# Patient Record
Sex: MALE | Race: WHITE | NOT HISPANIC OR LATINO | Employment: UNEMPLOYED | URBAN - NONMETROPOLITAN AREA
[De-identification: names, ages, dates, MRNs, and addresses within clinical notes are randomized per-mention and may not be internally consistent; named-entity substitution may affect disease eponyms.]

---

## 2017-07-20 ENCOUNTER — APPOINTMENT (OUTPATIENT)
Dept: GENERAL RADIOLOGY | Facility: HOSPITAL | Age: 52
End: 2017-07-20

## 2017-07-20 ENCOUNTER — HOSPITAL ENCOUNTER (EMERGENCY)
Facility: HOSPITAL | Age: 52
Discharge: HOME | End: 2017-07-21
Attending: EMERGENCY MEDICINE | Admitting: EMERGENCY MEDICINE

## 2017-07-20 DIAGNOSIS — R42 DIZZINESS: Primary | ICD-10-CM

## 2017-07-20 LAB
ALBUMIN SERPL-MCNC: 4.2 G/DL (ref 3.5–5)
ALBUMIN/GLOB SERPL: 1.4 G/DL (ref 1.1–2.5)
ALP SERPL-CCNC: 76 U/L (ref 24–120)
ALT SERPL W P-5'-P-CCNC: 44 U/L (ref 0–54)
ANION GAP SERPL CALCULATED.3IONS-SCNC: 9 MMOL/L (ref 4–13)
AST SERPL-CCNC: 25 U/L (ref 7–45)
BASOPHILS # BLD AUTO: 0.05 10*3/MM3 (ref 0–0.2)
BASOPHILS NFR BLD AUTO: 0.9 % (ref 0–2)
BILIRUB SERPL-MCNC: 0.6 MG/DL (ref 0.1–1)
BUN BLD-MCNC: 21 MG/DL (ref 5–21)
BUN/CREAT SERPL: 32.3 (ref 7–25)
CALCIUM SPEC-SCNC: 9.2 MG/DL (ref 8.4–10.4)
CHLORIDE SERPL-SCNC: 105 MMOL/L (ref 98–110)
CO2 SERPL-SCNC: 28 MMOL/L (ref 24–31)
CREAT BLD-MCNC: 0.65 MG/DL (ref 0.5–1.4)
DEPRECATED RDW RBC AUTO: 41 FL (ref 40–54)
EOSINOPHIL # BLD AUTO: 0.18 10*3/MM3 (ref 0–0.7)
EOSINOPHIL NFR BLD AUTO: 3.2 % (ref 0–4)
ERYTHROCYTE [DISTWIDTH] IN BLOOD BY AUTOMATED COUNT: 12.8 % (ref 12–15)
GFR SERPL CREATININE-BSD FRML MDRD: 129 ML/MIN/1.73
GLOBULIN UR ELPH-MCNC: 2.9 GM/DL
GLUCOSE BLD-MCNC: 213 MG/DL (ref 70–100)
HCT VFR BLD AUTO: 36.9 % (ref 40–52)
HGB BLD-MCNC: 12.6 G/DL (ref 14–18)
IMM GRANULOCYTES # BLD: 0.01 10*3/MM3 (ref 0–0.03)
IMM GRANULOCYTES NFR BLD: 0.2 % (ref 0–5)
LYMPHOCYTES # BLD AUTO: 2.42 10*3/MM3 (ref 0.72–4.86)
LYMPHOCYTES NFR BLD AUTO: 43.4 % (ref 15–45)
MCH RBC QN AUTO: 29.7 PG (ref 28–32)
MCHC RBC AUTO-ENTMCNC: 34.1 G/DL (ref 33–36)
MCV RBC AUTO: 87 FL (ref 82–95)
MONOCYTES # BLD AUTO: 0.45 10*3/MM3 (ref 0.19–1.3)
MONOCYTES NFR BLD AUTO: 8.1 % (ref 4–12)
NEUTROPHILS # BLD AUTO: 2.47 10*3/MM3 (ref 1.87–8.4)
NEUTROPHILS NFR BLD AUTO: 44.2 % (ref 39–78)
PLATELET # BLD AUTO: 180 10*3/MM3 (ref 130–400)
PMV BLD AUTO: 9.3 FL (ref 6–12)
POTASSIUM BLD-SCNC: 4.2 MMOL/L (ref 3.5–5.3)
PROT SERPL-MCNC: 7.1 G/DL (ref 6.3–8.7)
RBC # BLD AUTO: 4.24 10*6/MM3 (ref 4.8–5.9)
SODIUM BLD-SCNC: 142 MMOL/L (ref 135–145)
WBC NRBC COR # BLD: 5.58 10*3/MM3 (ref 4.8–10.8)

## 2017-07-20 PROCEDURE — 80053 COMPREHEN METABOLIC PANEL: CPT | Performed by: EMERGENCY MEDICINE

## 2017-07-20 PROCEDURE — 96360 HYDRATION IV INFUSION INIT: CPT

## 2017-07-20 PROCEDURE — 93010 ELECTROCARDIOGRAM REPORT: CPT | Performed by: INTERNAL MEDICINE

## 2017-07-20 PROCEDURE — 96361 HYDRATE IV INFUSION ADD-ON: CPT

## 2017-07-20 PROCEDURE — 71020 HC CHEST PA AND LATERAL: CPT

## 2017-07-20 PROCEDURE — 85025 COMPLETE CBC W/AUTO DIFF WBC: CPT | Performed by: EMERGENCY MEDICINE

## 2017-07-20 PROCEDURE — 99284 EMERGENCY DEPT VISIT MOD MDM: CPT

## 2017-07-20 PROCEDURE — 93005 ELECTROCARDIOGRAM TRACING: CPT | Performed by: EMERGENCY MEDICINE

## 2017-07-20 RX ADMIN — SODIUM CHLORIDE 1000 ML: 9 INJECTION, SOLUTION INTRAVENOUS at 21:51

## 2017-07-21 VITALS
DIASTOLIC BLOOD PRESSURE: 82 MMHG | SYSTOLIC BLOOD PRESSURE: 147 MMHG | TEMPERATURE: 98.1 F | OXYGEN SATURATION: 99 % | HEIGHT: 75 IN | RESPIRATION RATE: 18 BRPM | HEART RATE: 64 BPM | BODY MASS INDEX: 20.44 KG/M2 | WEIGHT: 164.38 LBS

## 2017-07-21 NOTE — ED PROVIDER NOTES
Subjective   HPI Comments: P{t c/o dizziness for the past 6 months.  Was told by his PCP to come to the ED for a stress test.  He has had no CP or SOB.  Pt reports that he has had multiple test performed by his PCP with no answer as to why he will become dizzy when he stands.        History provided by:  Patient      Review of Systems   Constitutional: Negative for activity change, appetite change, chills, diaphoresis, fatigue and fever.   HENT: Negative for congestion, ear pain, nosebleeds, postnasal drip and sinus pressure.    Eyes: Negative for photophobia and pain.   Respiratory: Negative for cough, chest tightness, shortness of breath and wheezing.    Cardiovascular: Negative for chest pain.   Gastrointestinal: Negative for abdominal pain, blood in stool, diarrhea, nausea and vomiting.   Endocrine: Negative for cold intolerance and heat intolerance.   Genitourinary: Negative for difficulty urinating, dysuria and flank pain.   Musculoskeletal: Negative for arthralgias, back pain, neck pain and neck stiffness.   Skin: Negative for color change and rash.   Neurological: Positive for dizziness. Negative for weakness and headaches.   Hematological: Negative for adenopathy. Does not bruise/bleed easily.   Psychiatric/Behavioral: Negative for confusion and sleep disturbance. The patient is not nervous/anxious.        Past Medical History:   Diagnosis Date   • Diabetes mellitus        No Known Allergies    Past Surgical History:   Procedure Laterality Date   • NO PAST SURGERIES         History reviewed. No pertinent family history.    Social History     Social History   • Marital status:      Spouse name: N/A   • Number of children: N/A   • Years of education: N/A     Social History Main Topics   • Smoking status: Never Smoker   • Smokeless tobacco: None   • Alcohol use No   • Drug use: No   • Sexual activity: Not Asked     Other Topics Concern   • None     Social History Narrative   • None           Objective    Physical Exam   Constitutional: He is oriented to person, place, and time. He appears well-developed and well-nourished. No distress.   HENT:   Head: Normocephalic and atraumatic.   Mouth/Throat: Oropharynx is clear and moist. No oropharyngeal exudate.   Eyes: Conjunctivae and EOM are normal. Pupils are equal, round, and reactive to light.   Neck: Normal range of motion. Neck supple. No JVD present.   Cardiovascular: Normal rate, regular rhythm and normal heart sounds.  Exam reveals no friction rub.    No murmur heard.  Pulmonary/Chest: Effort normal and breath sounds normal. He has no wheezes. He has no rales.   Abdominal: Soft. Bowel sounds are normal. He exhibits no distension. There is no tenderness. There is no rebound and no guarding.   Musculoskeletal: Normal range of motion. He exhibits no edema or tenderness.   Neurological: He is alert and oriented to person, place, and time. He has normal strength and normal reflexes. No cranial nerve deficit or sensory deficit. He displays a negative Romberg sign. Coordination and gait normal. GCS eye subscore is 4. GCS verbal subscore is 5. GCS motor subscore is 6.   Skin: Skin is warm and dry. No rash noted.   Psychiatric: He has a normal mood and affect. His behavior is normal. Judgment and thought content normal.   Nursing note and vitals reviewed.      Procedures         ED Course  ED Course   Value Comment By Time   ECG 12 Lead Normal sinus rhythm with a rate of 76, normal axis, no acute ST elevations or depressions. Don Hemphill MD 07/20 2113   ECG 12 Lead Normal sinus rhythm with a rate of 69, normal axis, no acute ST elevations or depressions. Don Hemphill MD 07/21 0023                  [facility]  Number of Diagnoses or Management Options  Dizziness: new and requires workup  Diagnosis management comments: Discussed with patient about his lab work, EKG and imaging.  His dizziness has been an ongoing issue for 6 months.  I explained to him that he was not  going to get a stress test to the ER.  There has been no change in his EKG he has not complained to me of any chest pain.  He has not been dizzy while here in the ED.  We will have him follow up with her primary care was told to return to ED if any further issues or new complaints.       Amount and/or Complexity of Data Reviewed  Clinical lab tests: ordered and reviewed  Tests in the radiology section of CPT®: ordered and reviewed  Tests in the medicine section of CPT®: ordered and reviewed  Independent visualization of images, tracings, or specimens: yes    Risk of Complications, Morbidity, and/or Mortality  Presenting problems: moderate  Diagnostic procedures: moderate  Management options: moderate    Patient Progress  Patient progress: stable      Final diagnoses:   Dizziness            Don Hemphill MD  07/21/17 0025       Don Hemphill MD  08/03/17 1936

## 2017-10-02 ENCOUNTER — OFFICE VISIT (OUTPATIENT)
Dept: NEUROLOGY | Facility: CLINIC | Age: 52
End: 2017-10-02

## 2017-10-02 VITALS
HEART RATE: 74 BPM | SYSTOLIC BLOOD PRESSURE: 72 MMHG | HEIGHT: 75 IN | WEIGHT: 159 LBS | BODY MASS INDEX: 19.77 KG/M2 | DIASTOLIC BLOOD PRESSURE: 42 MMHG | RESPIRATION RATE: 18 BRPM

## 2017-10-02 DIAGNOSIS — R26.89 IMBALANCE: ICD-10-CM

## 2017-10-02 DIAGNOSIS — R51.9 CHRONIC DAILY HEADACHE: Primary | ICD-10-CM

## 2017-10-02 DIAGNOSIS — I95.1 ORTHOSTASIS: ICD-10-CM

## 2017-10-02 PROCEDURE — 99204 OFFICE O/P NEW MOD 45 MIN: CPT | Performed by: PSYCHIATRY & NEUROLOGY

## 2017-10-02 RX ORDER — AMPICILLIN 500 MG/1
500 CAPSULE ORAL 3 TIMES DAILY
COMMUNITY
End: 2019-03-27 | Stop reason: HOSPADM

## 2017-10-02 NOTE — PATIENT INSTRUCTIONS
Fall precautions and safety precautions and driving precautions.  Patient not to be climbing or using sharp cutting tools and is not working over hot fires or water.  Patient to get with PCP immediately about elevated blood sugars and orthostasis as well as abrasions over her scalp area

## 2017-10-02 NOTE — PROGRESS NOTES
Subjective   Ford High, 1965, is a male who is being seen today for   Chief Complaint   Patient presents with   • Headache   • Dizziness       HISTORY OF PRESENT ILLNESS: Patient presents with a history of headaches and dizziness.  Patient complains of chronic daily headache about significant nausea vomiting or change in vision.  This is usually over the vertex area.  Patient has constant/lightheadedness.  Patient according to the notes is poorly controlled diabetic.  Last blood work he had in August showed a glucose of 446 and BUN of 28.  CBC showed no major abnormalities.  Patient says he bumps his had frequently and has abrasions over the scalp area.  Patient denies any history of seizures or loss of consciousness or significant head trauma.  Patient denies any family history of migraine or aneurysms.    REVIEW OF SYSTEMS:   GENERAL: Denies any fever chills  PULMONARY: Denies any shortness of breath  CVS:  Denies any chest pain  GASTROINTESTINAL: No acute GI distress  GENITOURINARY: No acute  distress  GYN: Not applicable  MUSCULOSKELETAL: No acute musculoskeletal symptoms  HEENT:  As above  ENDOCRINE:  No acute endocrine symptoms other than as above  PSYCHIATRIC: No acute psychiatric symptoms  HEMATOLOGY: As above  SKIN: As above  Family history reviewed and otherwise noncontributory  Social history: Patient denies smoking or alcohol or drug use.  Patient delivers pizzas    PHYSICAL EXAMINATION:    GENERAL: As above  CRANIUM: Abrasions as noted above  HEENT: No acute fundic abnormalities.  Pupils equal round reactive to light.       EYES: EOMs intact without nystagmus and fields full to confrontation       EARS:  Tympanic membranes normal/ hears tuning fork bilaterally       THROAT: No oropharynx abnormalities       NECK:  No bruits/no lymphadenopathy  CHEST: No acute cardiopulmonary abnormalities by auscultation  ABDOMEN: Nondistended  EXTREMITIES: Pulses symmetrical  NEURO: Patient alert and  follows commands without difficulty  SPEECH:  Normal    CRANIAL NERVES:  Motor sensory about the face normal and symmetric    MOTOR STRENGTH:  Motor strength upper and lower extremities normal  STATION AND GAIT:  Gait is normal/Romberg negative  CEREBELLAR:  Finger-nose and heel shin normal  SENSORY:  Pin and vibration upper and lower extremities normal  REFLEXES:  Reflexes present and symmetric upper and lower extremities without Babinski's      ASSESSMENT AND PLAN:  Patient with history of chronic daily headache and orthostasis and elevated blood sugars as above.  Patient is to get with PCP about blood pressures and get baseline MRI brain (she denies any metal to contraindicate) and EEG/carotid ultrasound and further blood work.  I did discuss at length with him safety precautions and driving precautions.      Ford was seen today for headache and dizziness.    Diagnoses and all orders for this visit:    Chronic daily headache  -     EEG; Future  -     MRI Brain Without Contrast; Future  -     CBC & Differential; Future  -     Comprehensive Metabolic Panel; Future  -     Folate; Future  -     Lipid Panel; Future  -     Magnesium; Future  -     Sedimentation Rate; Future  -     T4, Free; Future  -     Vitamin B12; Future  -     Hemoglobin A1c; Future    Orthostasis    Imbalance  -     US Carotid Bilateral; Future

## 2017-10-16 ENCOUNTER — LAB (OUTPATIENT)
Dept: LAB | Facility: HOSPITAL | Age: 52
End: 2017-10-16
Attending: PSYCHIATRY & NEUROLOGY

## 2017-10-16 DIAGNOSIS — R51.9 CHRONIC DAILY HEADACHE: ICD-10-CM

## 2017-10-16 LAB
ALBUMIN SERPL-MCNC: 4.3 G/DL (ref 3.5–5)
ALBUMIN/GLOB SERPL: 1.5 G/DL (ref 1.1–2.5)
ALP SERPL-CCNC: 110 U/L (ref 24–120)
ALT SERPL W P-5'-P-CCNC: 29 U/L (ref 0–54)
ANION GAP SERPL CALCULATED.3IONS-SCNC: 11 MMOL/L (ref 4–13)
ARTICHOKE IGE QN: 163 MG/DL (ref 0–99)
AST SERPL-CCNC: 21 U/L (ref 7–45)
BASOPHILS # BLD AUTO: 0.03 10*3/MM3 (ref 0–0.2)
BASOPHILS NFR BLD AUTO: 0.4 % (ref 0–2)
BILIRUB SERPL-MCNC: 0.3 MG/DL (ref 0.1–1)
BUN BLD-MCNC: 27 MG/DL (ref 5–21)
BUN/CREAT SERPL: 35.5 (ref 7–25)
CALCIUM SPEC-SCNC: 9.3 MG/DL (ref 8.4–10.4)
CHLORIDE SERPL-SCNC: 100 MMOL/L (ref 98–110)
CHOLEST SERPL-MCNC: 269 MG/DL (ref 130–200)
CO2 SERPL-SCNC: 26 MMOL/L (ref 24–31)
CREAT BLD-MCNC: 0.76 MG/DL (ref 0.5–1.4)
DEPRECATED RDW RBC AUTO: 40.9 FL (ref 40–54)
EOSINOPHIL # BLD AUTO: 0.19 10*3/MM3 (ref 0–0.7)
EOSINOPHIL NFR BLD AUTO: 2.8 % (ref 0–4)
ERYTHROCYTE [DISTWIDTH] IN BLOOD BY AUTOMATED COUNT: 12.8 % (ref 12–15)
ERYTHROCYTE [SEDIMENTATION RATE] IN BLOOD: 7 MM/HR (ref 0–15)
FOLATE SERPL-MCNC: 11 NG/ML
GFR SERPL CREATININE-BSD FRML MDRD: 108 ML/MIN/1.73
GLOBULIN UR ELPH-MCNC: 2.8 GM/DL
GLUCOSE BLD-MCNC: 380 MG/DL (ref 70–100)
HBA1C MFR BLD: 12.1 %
HCT VFR BLD AUTO: 36.6 % (ref 40–52)
HDLC SERPL-MCNC: 36 MG/DL
HGB BLD-MCNC: 12.7 G/DL (ref 14–18)
IMM GRANULOCYTES # BLD: 0.01 10*3/MM3 (ref 0–0.03)
IMM GRANULOCYTES NFR BLD: 0.1 % (ref 0–5)
LDLC/HDLC SERPL: ABNORMAL {RATIO}
LYMPHOCYTES # BLD AUTO: 2.86 10*3/MM3 (ref 0.72–4.86)
LYMPHOCYTES NFR BLD AUTO: 42.1 % (ref 15–45)
MAGNESIUM SERPL-MCNC: 2.1 MG/DL (ref 1.4–2.2)
MCH RBC QN AUTO: 30.4 PG (ref 28–32)
MCHC RBC AUTO-ENTMCNC: 34.7 G/DL (ref 33–36)
MCV RBC AUTO: 87.6 FL (ref 82–95)
MONOCYTES # BLD AUTO: 0.51 10*3/MM3 (ref 0.19–1.3)
MONOCYTES NFR BLD AUTO: 7.5 % (ref 4–12)
NEUTROPHILS # BLD AUTO: 3.19 10*3/MM3 (ref 1.87–8.4)
NEUTROPHILS NFR BLD AUTO: 47.1 % (ref 39–78)
PLATELET # BLD AUTO: 277 10*3/MM3 (ref 130–400)
PMV BLD AUTO: 10 FL (ref 6–12)
POTASSIUM BLD-SCNC: 4.7 MMOL/L (ref 3.5–5.3)
PROT SERPL-MCNC: 7.1 G/DL (ref 6.3–8.7)
RBC # BLD AUTO: 4.18 10*6/MM3 (ref 4.8–5.9)
SODIUM BLD-SCNC: 137 MMOL/L (ref 135–145)
T4 FREE SERPL-MCNC: 0.92 NG/DL (ref 0.78–2.19)
TRIGL SERPL-MCNC: 412 MG/DL (ref 0–149)
VIT B12 BLD-MCNC: 664 PG/ML (ref 239–931)
WBC NRBC COR # BLD: 6.79 10*3/MM3 (ref 4.8–10.8)

## 2017-10-16 PROCEDURE — 84439 ASSAY OF FREE THYROXINE: CPT | Performed by: PSYCHIATRY & NEUROLOGY

## 2017-10-16 PROCEDURE — 82746 ASSAY OF FOLIC ACID SERUM: CPT | Performed by: PSYCHIATRY & NEUROLOGY

## 2017-10-16 PROCEDURE — 80053 COMPREHEN METABOLIC PANEL: CPT | Performed by: PSYCHIATRY & NEUROLOGY

## 2017-10-16 PROCEDURE — 83036 HEMOGLOBIN GLYCOSYLATED A1C: CPT | Performed by: PSYCHIATRY & NEUROLOGY

## 2017-10-16 PROCEDURE — 85025 COMPLETE CBC W/AUTO DIFF WBC: CPT | Performed by: PSYCHIATRY & NEUROLOGY

## 2017-10-16 PROCEDURE — 82607 VITAMIN B-12: CPT | Performed by: PSYCHIATRY & NEUROLOGY

## 2017-10-16 PROCEDURE — 83735 ASSAY OF MAGNESIUM: CPT | Performed by: PSYCHIATRY & NEUROLOGY

## 2017-10-16 PROCEDURE — 85651 RBC SED RATE NONAUTOMATED: CPT | Performed by: PSYCHIATRY & NEUROLOGY

## 2017-10-16 PROCEDURE — 80061 LIPID PANEL: CPT | Performed by: PSYCHIATRY & NEUROLOGY

## 2017-10-19 ENCOUNTER — HOSPITAL ENCOUNTER (OUTPATIENT)
Dept: ULTRASOUND IMAGING | Facility: HOSPITAL | Age: 52
Discharge: HOME | End: 2017-10-19
Attending: PSYCHIATRY & NEUROLOGY

## 2017-10-19 ENCOUNTER — HOSPITAL ENCOUNTER (OUTPATIENT)
Dept: NEUROLOGY | Facility: HOSPITAL | Age: 52
Discharge: HOME | End: 2017-10-19
Attending: PSYCHIATRY & NEUROLOGY | Admitting: PSYCHIATRY & NEUROLOGY

## 2017-10-19 ENCOUNTER — HOSPITAL ENCOUNTER (OUTPATIENT)
Dept: MRI IMAGING | Facility: HOSPITAL | Age: 52
Discharge: HOME | End: 2017-10-19
Attending: PSYCHIATRY & NEUROLOGY

## 2017-10-19 DIAGNOSIS — R26.89 IMBALANCE: ICD-10-CM

## 2017-10-19 DIAGNOSIS — R51.9 CHRONIC DAILY HEADACHE: ICD-10-CM

## 2017-10-19 PROCEDURE — 95816 EEG AWAKE AND DROWSY: CPT | Performed by: PSYCHIATRY & NEUROLOGY

## 2017-10-19 PROCEDURE — 70551 MRI BRAIN STEM W/O DYE: CPT

## 2017-10-19 PROCEDURE — 93880 EXTRACRANIAL BILAT STUDY: CPT

## 2017-10-19 PROCEDURE — 95816 EEG AWAKE AND DROWSY: CPT

## 2017-10-19 PROCEDURE — 93880 EXTRACRANIAL BILAT STUDY: CPT | Performed by: SURGERY

## 2017-10-20 ENCOUNTER — TELEPHONE (OUTPATIENT)
Dept: NEUROLOGY | Facility: CLINIC | Age: 52
End: 2017-10-20

## 2017-10-20 NOTE — TELEPHONE ENCOUNTER
----- Message from Jasbir Costello MD sent at 10/19/2017  1:10 PM CDT -----  Contact patient that MRI brain showed no acute abnormalities.  There is general atrophy slightly greater than expected for age.

## 2017-10-26 ENCOUNTER — APPOINTMENT (OUTPATIENT)
Dept: CT IMAGING | Facility: HOSPITAL | Age: 52
End: 2017-10-26

## 2017-10-26 ENCOUNTER — HOSPITAL ENCOUNTER (EMERGENCY)
Facility: HOSPITAL | Age: 52
Discharge: HOME | End: 2017-10-26
Attending: EMERGENCY MEDICINE | Admitting: EMERGENCY MEDICINE

## 2017-10-26 VITALS
SYSTOLIC BLOOD PRESSURE: 144 MMHG | RESPIRATION RATE: 16 BRPM | BODY MASS INDEX: 19.89 KG/M2 | DIASTOLIC BLOOD PRESSURE: 88 MMHG | HEIGHT: 75 IN | TEMPERATURE: 97.8 F | HEART RATE: 81 BPM | OXYGEN SATURATION: 99 % | WEIGHT: 160 LBS
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
## 2017-10-26 DIAGNOSIS — S01.01XA LACERATION OF SCALP, INITIAL ENCOUNTER: ICD-10-CM

## 2017-10-26 PROCEDURE — 70450 CT HEAD/BRAIN W/O DYE: CPT

## 2017-10-26 PROCEDURE — 25010000002 TDAP 5-2.5-18.5 LF-MCG/0.5 SUSPENSION: Performed by: EMERGENCY MEDICINE

## 2017-10-26 PROCEDURE — 90715 TDAP VACCINE 7 YRS/> IM: CPT | Performed by: EMERGENCY MEDICINE

## 2017-10-26 PROCEDURE — 99284 EMERGENCY DEPT VISIT MOD MDM: CPT

## 2017-10-26 PROCEDURE — 90471 IMMUNIZATION ADMIN: CPT | Performed by: EMERGENCY MEDICINE

## 2017-10-26 RX ORDER — LIDOCAINE HYDROCHLORIDE AND EPINEPHRINE 10; 10 MG/ML; UG/ML
10 INJECTION, SOLUTION INFILTRATION; PERINEURAL ONCE
Status: COMPLETED | OUTPATIENT
Start: 2017-10-26 | End: 2017-10-26

## 2017-10-26 RX ORDER — HYDROCODONE BITARTRATE AND ACETAMINOPHEN 7.5; 325 MG/1; MG/1
1 TABLET ORAL EVERY 4 HOURS PRN
Qty: 15 TABLET | Refills: 0 | Status: ON HOLD | OUTPATIENT
Start: 2017-10-26 | End: 2020-02-06

## 2017-10-26 RX ADMIN — TETANUS TOXOID, REDUCED DIPHTHERIA TOXOID AND ACELLULAR PERTUSSIS VACCINE, ADSORBED 0.5 ML: 5; 2.5; 8; 8; 2.5 SUSPENSION INTRAMUSCULAR at 19:11

## 2017-10-26 RX ADMIN — LIDOCAINE HYDROCHLORIDE AND EPINEPHRINE 10 ML: 10; 10 INJECTION, SOLUTION INFILTRATION; PERINEURAL at 18:45

## 2017-10-26 RX ADMIN — LIDOCAINE HYDROCHLORIDE 5 ML: 20 JELLY TOPICAL at 19:25
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
## 2019-02-20 ENCOUNTER — HOSPITAL ENCOUNTER (EMERGENCY)
Facility: HOSPITAL | Age: 54
Discharge: HOME | End: 2019-02-20
Attending: EMERGENCY MEDICINE | Admitting: EMERGENCY MEDICINE

## 2019-02-20 VITALS
SYSTOLIC BLOOD PRESSURE: 114 MMHG | RESPIRATION RATE: 18 BRPM | TEMPERATURE: 97.8 F | WEIGHT: 156.4 LBS | OXYGEN SATURATION: 100 % | HEART RATE: 64 BPM | HEIGHT: 75 IN | BODY MASS INDEX: 19.45 KG/M2 | DIASTOLIC BLOOD PRESSURE: 70 MMHG

## 2019-02-20 DIAGNOSIS — I95.1 POSTURAL HYPOTENSION: Primary | ICD-10-CM

## 2019-02-20 LAB
ALBUMIN SERPL-MCNC: 4.5 G/DL (ref 3.5–5)
ALBUMIN/GLOB SERPL: 1.6 G/DL (ref 1.1–2.5)
ALP SERPL-CCNC: 93 U/L (ref 24–120)
ALT SERPL W P-5'-P-CCNC: 17 U/L (ref 0–54)
ANION GAP SERPL CALCULATED.3IONS-SCNC: 7 MMOL/L (ref 4–13)
ARTERIAL PATENCY WRIST A: POSITIVE
AST SERPL-CCNC: 23 U/L (ref 7–45)
ATMOSPHERIC PRESS: 746 MMHG
BASE EXCESS BLDA CALC-SCNC: 5.6 MMOL/L (ref 0–2)
BASOPHILS # BLD AUTO: 0.05 10*3/MM3 (ref 0–0.2)
BASOPHILS NFR BLD AUTO: 0.8 % (ref 0–2)
BDY SITE: ABNORMAL
BILIRUB SERPL-MCNC: 0.6 MG/DL (ref 0.1–1)
BODY TEMPERATURE: 37 C
BUN BLD-MCNC: 19 MG/DL (ref 5–21)
BUN/CREAT SERPL: 22.9 (ref 7–25)
CALCIUM SPEC-SCNC: 9.4 MG/DL (ref 8.4–10.4)
CHLORIDE SERPL-SCNC: 98 MMOL/L (ref 98–110)
CO2 SERPL-SCNC: 33 MMOL/L (ref 24–31)
CREAT BLD-MCNC: 0.83 MG/DL (ref 0.5–1.4)
DEPRECATED RDW RBC AUTO: 36.7 FL (ref 40–54)
EOSINOPHIL # BLD AUTO: 0.11 10*3/MM3 (ref 0–0.7)
EOSINOPHIL NFR BLD AUTO: 1.7 % (ref 0–4)
ERYTHROCYTE [DISTWIDTH] IN BLOOD BY AUTOMATED COUNT: 11.9 % (ref 12–15)
GFR SERPL CREATININE-BSD FRML MDRD: 97 ML/MIN/1.73
GLOBULIN UR ELPH-MCNC: 2.9 GM/DL
GLUCOSE BLD-MCNC: 148 MG/DL (ref 70–100)
HCO3 BLDA-SCNC: 30.5 MMOL/L (ref 20–26)
HCT VFR BLD AUTO: 37.4 % (ref 40–52)
HGB BLD-MCNC: 13 G/DL (ref 14–18)
IMM GRANULOCYTES # BLD AUTO: 0.03 10*3/MM3 (ref 0–0.05)
IMM GRANULOCYTES NFR BLD AUTO: 0.5 % (ref 0–5)
LYMPHOCYTES # BLD AUTO: 2.77 10*3/MM3 (ref 0.72–4.86)
LYMPHOCYTES NFR BLD AUTO: 42 % (ref 15–45)
Lab: ABNORMAL
MCH RBC QN AUTO: 29.8 PG (ref 28–32)
MCHC RBC AUTO-ENTMCNC: 34.8 G/DL (ref 33–36)
MCV RBC AUTO: 85.8 FL (ref 82–95)
MODALITY: ABNORMAL
MONOCYTES # BLD AUTO: 0.5 10*3/MM3 (ref 0.19–1.3)
MONOCYTES NFR BLD AUTO: 7.6 % (ref 4–12)
NEUTROPHILS # BLD AUTO: 3.14 10*3/MM3 (ref 1.87–8.4)
NEUTROPHILS NFR BLD AUTO: 47.4 % (ref 39–78)
NRBC BLD AUTO-RTO: 0 /100 WBC (ref 0–0)
PCO2 BLDA: 44.5 MM HG (ref 35–45)
PH BLDA: 7.44 PH UNITS (ref 7.35–7.45)
PLATELET # BLD AUTO: 266 10*3/MM3 (ref 130–400)
PMV BLD AUTO: 9.4 FL (ref 6–12)
PO2 BLDA: 94.6 MM HG (ref 83–108)
POTASSIUM BLD-SCNC: 3.3 MMOL/L (ref 3.5–5.3)
PROT SERPL-MCNC: 7.4 G/DL (ref 6.3–8.7)
RBC # BLD AUTO: 4.36 10*6/MM3 (ref 4.8–5.9)
SAO2 % BLDCOA: 98.1 % (ref 94–99)
SODIUM BLD-SCNC: 138 MMOL/L (ref 135–145)
TROPONIN I SERPL-MCNC: <0.012 NG/ML (ref 0–0.03)
VENTILATOR MODE: ABNORMAL
WBC NRBC COR # BLD: 6.6 10*3/MM3 (ref 4.8–10.8)

## 2019-02-20 PROCEDURE — 84484 ASSAY OF TROPONIN QUANT: CPT | Performed by: EMERGENCY MEDICINE

## 2019-02-20 PROCEDURE — 93010 ELECTROCARDIOGRAM REPORT: CPT | Performed by: INTERNAL MEDICINE

## 2019-02-20 PROCEDURE — 99283 EMERGENCY DEPT VISIT LOW MDM: CPT

## 2019-02-20 PROCEDURE — 93005 ELECTROCARDIOGRAM TRACING: CPT | Performed by: EMERGENCY MEDICINE

## 2019-02-20 PROCEDURE — 85025 COMPLETE CBC W/AUTO DIFF WBC: CPT | Performed by: EMERGENCY MEDICINE

## 2019-02-20 PROCEDURE — 93005 ELECTROCARDIOGRAM TRACING: CPT

## 2019-02-20 PROCEDURE — 80053 COMPREHEN METABOLIC PANEL: CPT | Performed by: EMERGENCY MEDICINE

## 2019-02-20 PROCEDURE — 36600 WITHDRAWAL OF ARTERIAL BLOOD: CPT

## 2019-02-20 PROCEDURE — 82803 BLOOD GASES ANY COMBINATION: CPT

## 2019-02-20 NOTE — DISCHARGE INSTRUCTIONS
Near-Syncope  Near-syncope is when you suddenly get weak or dizzy, or you feel like you might pass out (faint). During an episode of near-syncope, you may:  · Feel dizzy or light-headed.  · Feel sick to your stomach (nauseous).  · See all white or all black.  · Have cold, clammy skin.    If you passed out, get help right away.Call your local emergency services (021 in the [place] Do not drive yourself to the hospital.  Follow these instructions at home:  Pay attention to any changes in your symptoms. Take these actions to help with your condition:  · Have someone stay with you until you feel stable.  · Do not drive, use machinery, or play sports until your doctor says it is okay.  · Keep all follow-up visits as told by your doctor. This is important.  · If you start to feel like you might pass out, lie down right away and raise (elevate) your feet above the level of your heart. Breathe deeply and steadily. Wait until all of the symptoms are gone.  · Drink enough fluid to keep your pee (urine) clear or pale yellow.  · If you are taking blood pressure or heart medicine, get up slowly and spend many minutes getting ready to sit and then stand. This can help with dizziness.  · Take over-the-counter and prescription medicines only as told by your doctor.    Get help right away if:  · You have a very bad headache.  · You have unusual pain in your chest, tummy, or back.  · You are bleeding from your mouth or rectum.  · You have black or tarry poop (stool).  · You have a very fast or uneven heartbeat (palpitations).  · You pass out one time or more than once.  · You have jerky movements that you cannot control (seizure).  · You are confused.  · You have trouble walking.  · You are very weak.  · You have vision problems.  These symptoms may be an emergency. Do not wait to see if the symptoms will go away. Get medical help right away. Call your local emergency services (922 in the [place] Do not drive yourself to the  [facility].  This information is not intended to replace advice given to you by your health care provider. Make sure you discuss any questions you have with your health care provider.  Document Released: 06/05/2009 Document Revised: 05/25/2017 Document Reviewed: 08/31/2016  [employer] Interactive Patient Education © 2017 [employer]      Hypotension  As your heart beats, it forces blood through your body. This force is called blood pressure. If you have hypotension, you have low blood pressure. When your blood pressure is too low, you may not get enough blood to your brain. You may feel weak, feel light-headed, have a fast heartbeat, or even pass out (faint).  Follow these instructions at home:  Eating and drinking  · Drink enough fluids to keep your pee (urine) clear or pale yellow.  · Eat a healthy diet, and follow instructions from your doctor about eating or drinking restrictions. A healthy diet includes:  ? Fresh fruits and vegetables.  ? Whole grains.  ? Low-fat (lean) meats.  ? Low-fat dairy products.  · Eat extra salt only as told. Do not add extra salt to your diet unless your doctor tells you to.  · Eat small meals often.  · Avoid standing up quickly after you eat.  Medicines  · Take over-the-counter and prescription medicines only as told by your doctor.  ? Follow instructions from your doctor about changing how much you take (the dosage) of your medicines, if this applies.  ? Do not stop or change your medicine on your own.  General instructions  · Wear compression stockings as told by your doctor.  · Get up slowly from lying down or sitting.  · Avoid hot showers and a lot of heat as told by your doctor.  · Return to your normal activities as told by your doctor. Ask what activities are safe for you.  · Do not use any products that contain nicotine or tobacco, such as cigarettes and e-cigarettes. If you need help quitting, ask your doctor.  · Keep all follow-up visits as told by your doctor. This is  important.  Contact a doctor if:  · You throw up (vomit).  · You have watery poop (diarrhea).  · You have a fever for more than 2-3 days.  · You feel more thirsty than normal.  · You feel weak and tired.  Get help right away if:  · You have chest pain.  · You have a fast or irregular heartbeat.  · You lose feeling (get numbness) in any part of your body.  · You cannot move your arms or your legs.  · You have trouble talking.  · You get sweaty or feel light-headed.  · You faint.  · You have trouble breathing.  · You have trouble staying awake.  · You feel confused.  This information is not intended to replace advice given to you by your health care provider. Make sure you discuss any questions you have with your health care provider.  Document Released: 03/14/2011 Document Revised: 09/05/2017 Document Reviewed: 09/05/2017  [employer] Interactive Patient Education © 2017 [employer]      Orthostatic Hypotension  Orthostatic hypotension is a sudden drop in blood pressure that happens when you quickly change positions, such as when you get up from a seated or lying position. Blood pressure is a measurement of how strongly, or weakly, your blood is pressing against the walls of your arteries. Arteries are blood vessels that carry blood from your heart throughout your body. When blood pressure is too low, you may not get enough blood to your brain or to the rest of your organs. This can cause weakness, light-headedness, rapid heartbeat, and fainting. This can last for just a few seconds or for up to a few minutes.  Orthostatic hypotension is usually not a serious problem. However, if it happens frequently or gets worse, it may be a sign of something more serious.  What are the causes?  This condition may be caused by:  · Sudden changes in posture, such as standing up quickly after you have been sitting or lying down.  · Blood loss.  · Loss of body fluids (dehydration).  · Heart problems.  · Hormone (endocrine)  "problems.  · Pregnancy.  · Severe infection.  · Lack of certain nutrients.  · Severe allergic reactions (anaphylaxis).  · Certain medicines, such as blood pressure medicine or medicines that make the body lose excess fluids (diuretics). Sometimes, this condition can be caused by not taking medicine as directed, such as taking too much of a certain medicine.    What increases the risk?  Certain factors can make you more likely to develop orthostatic hypotension, including:  · Age. Risk increases as you get older.  · Conditions that affect the heart or the central nervous system.  · Taking certain medicines, such as blood pressure medicine or diuretics.  · Being pregnant.    What are the signs or symptoms?  Symptoms of this condition may include:  · Weakness.  · Light-headedness.  · Dizziness.  · Blurred vision.  · Fatigue.  · Rapid heartbeat.  · Fainting, in severe cases.    How is this diagnosed?  This condition is diagnosed based on:  · Your medical history.  · Your symptoms.  · Your blood pressure measurement. Your health care provider will check your blood pressure when you are:  ? Lying down.  ? Sitting.  ? Standing.    A blood pressure reading is recorded as two numbers, such as \"120 over 80\" (or 120/80). The first (\"top\") number is called the systolic pressure. It is a measure of the pressure in your arteries as your heart beats. The second (\"bottom\") number is called the diastolic pressure. It is a measure of the pressure in your arteries when your heart relaxes between beats. Blood pressure is measured in a unit called mm Hg. Healthy blood pressure for adults is 120/80. If your blood pressure is below 90/60, you may be diagnosed with hypotension.  Other information or tests that may be used to diagnose orthostatic hypotension include:  · Your other vital signs, such as your heart rate and temperature.  · Blood tests.  · Tilt table test. For this test, you will be safely secured to a table that moves you from " a lying position to an upright position. Your heart rhythm and blood pressure will be monitored during the test.    How is this treated?  Treatment for this condition may include:  · Changing your diet. This may involve eating more salt (sodium) or drinking more water.  · Taking medicines to raise your blood pressure.  · Changing the dosage of certain medicines you are taking that might be lowering your blood pressure.  · Wearing compression stockings. These stockings help to prevent blood clots and reduce swelling in your legs.    In some cases, you may need to go to the hospital for:  · Fluid replacement. This means you will receive fluids through an IV tube.  · Blood replacement. This means you will receive donated blood through an IV tube (transfusion).  · Treating an infection or heart problems, if this applies.  · Monitoring. You may need to be monitored while medicines that you are taking wear off.    Follow these instructions at home:  Eating and drinking    · Drink enough fluid to keep your urine clear or pale yellow.  · Eat a healthy diet and follow instructions from your health care provider about eating or drinking restrictions. A healthy diet includes:  ? Fresh fruits and vegetables.  ? Whole grains.  ? Lean meats.  ? Low-fat dairy products.  · Eat extra salt only as directed. Do not add extra salt to your diet unless your health care provider told you to do that.  · Eat frequent, small meals.  · Avoid standing up suddenly after eating.  Medicines  · Take over-the-counter and prescription medicines only as told by your health care provider.  ? Follow instructions from your health care provider about changing the dosage of your current medicines, if this applies.  ? Do not stop or adjust any of your medicines on your own.  General instructions  · Wear compression stockings as told by your health care provider.  · Get up slowly from lying down or sitting positions. This gives your blood pressure a chance  to adjust.  · Avoid hot showers and excessive heat as directed by your health care provider.  · Return to your normal activities as told by your health care provider. Ask your health care provider what activities are safe for you.  · Do not use any products that contain nicotine or tobacco, such as cigarettes and e-cigarettes. If you need help quitting, ask your health care provider.  · Keep all follow-up visits as told by your health care provider. This is important.  Contact a health care provider if:  · You vomit.  · You have diarrhea.  · You have a fever for more than 2-3 days.  · You feel more thirsty than usual.  · You feel weak and tired.  Get help right away if:  · You have chest pain.  · You have a fast or irregular heartbeat.  · You develop numbness in any part of your body.  · You cannot move your arms or your legs.  · You have trouble speaking.  · You become sweaty or feel lightheaded.  · You faint.  · You feel short of breath.  · You have trouble staying awake.  · You feel confused.  This information is not intended to replace advice given to you by your health care provider. Make sure you discuss any questions you have with your health care provider.  Document Released: 12/08/2003 Document Revised: 09/05/2017 Document Reviewed: 06/09/2017  [employer] Interactive Patient Education © 2018 [employer]

## 2019-02-20 NOTE — ED PROVIDER NOTES
Subjective   Patient states that when he stands up his blood pressure drops he is a diabetic he almost passed out today no loss of consciousness has chronic back pain        Syncope   Episode history:  Single (near syncope no loc)  Most recent episode:  Today  Timing:  Intermittent  Progression:  Partially resolved  Chronicity:  Chronic  Context: standing up    Context: not blood draw, not bowel movement, not dehydration, not exertion, not inactivity, not medication change, not with normal activity, not sight of blood and not urination    Witnessed: no    Relieved by:  Lying down  Worsened by:  Posture  Ineffective treatments:  None tried  Associated symptoms: no anxiety, no chest pain, no confusion, no diaphoresis, no difficulty breathing, no dizziness, no fever, no focal sensory loss, no focal weakness, no headaches, no malaise/fatigue, no nausea, no palpitations, no recent fall, no recent injury, no seizures, no shortness of breath, no visual change and no weakness    Risk factors: no seizures    Back Pain   Associated symptoms: no chest pain, no fever, no headaches and no weakness        Review of Systems   Constitutional: Negative for diaphoresis, fever and malaise/fatigue.   HENT: Negative.    Eyes: Negative.    Respiratory: Negative.  Negative for shortness of breath.    Cardiovascular: Positive for syncope. Negative for chest pain and palpitations.   Gastrointestinal: Negative for nausea.   Endocrine: Negative.    Genitourinary: Negative.    Musculoskeletal: Positive for back pain.   Skin: Negative.    Neurological: Negative for dizziness, focal weakness, seizures, weakness and headaches.   Hematological: Negative.    Psychiatric/Behavioral: Negative for confusion.   All other systems reviewed and are negative.      Past Medical History:   Diagnosis Date   • Autonomic neuropathy    • Chronic diarrhea    • Diabetes mellitus (CMS/HCC)    • Diabetic foot ulcer (CMS/HCC)    • Lateral epicondylitis, right elbow     • Noncompliance    • Orthostatic hypotension    • Skin infection        No Known Allergies    Past Surgical History:   Procedure Laterality Date   • TONSILLECTOMY         Family History   Problem Relation Age of Onset   • Diabetes Mother    • Arthritis Mother    • Hyperlipidemia Mother    • Heart disease Father    • Diabetes Father    • Arthritis Father        Social History     Socioeconomic History   • Marital status:      Spouse name: Not on file   • Number of children: Not on file   • Years of education: Not on file   • Highest education level: Not on file   Tobacco Use   • Smoking status: Never Smoker   Substance and Sexual Activity   • Alcohol use: No   • Drug use: No           Objective   Physical Exam   Constitutional: He is oriented to person, place, and time. He appears well-developed and well-nourished.  Non-toxic appearance.   HENT:   Head: Normocephalic and atraumatic.   Mouth/Throat: Oropharynx is clear and moist.   Eyes: Conjunctivae are normal. Pupils are equal, round, and reactive to light.   Neck: Normal range of motion. Neck supple. No hepatojugular reflux and no JVD present.   Cardiovascular: Normal rate, regular rhythm, normal heart sounds and intact distal pulses. PMI is not displaced. Exam reveals no decreased pulses.   No murmur heard.  Pulses:       Radial pulses are 2+ on the right side, and 2+ on the left side.        Femoral pulses are 2+ on the right side, and 2+ on the left side.       Dorsalis pedis pulses are 2+ on the right side, and 2+ on the left side.        Posterior tibial pulses are 2+ on the right side, and 2+ on the left side.   Pulmonary/Chest: Effort normal and breath sounds normal. No accessory muscle usage. No apnea. No respiratory distress. He has no decreased breath sounds. He has no wheezes.   Abdominal: Normal appearance, normal aorta and bowel sounds are normal. He exhibits no shifting dullness, no distension, no fluid wave, no abdominal bruit, no ascites,  no pulsatile midline mass and no mass. There is no tenderness. There is no guarding.   Musculoskeletal: Normal range of motion.        Cervical back: Normal.        Thoracic back: Normal.        Lumbar back: Normal.   Neurological: He is alert and oriented to person, place, and time. He has normal strength and normal reflexes. No cranial nerve deficit. GCS eye subscore is 4. GCS verbal subscore is 5. GCS motor subscore is 6.   Skin: Skin is warm and dry.   Psychiatric: He has a normal mood and affect. His behavior is normal.   Nursing note and vitals reviewed.      Procedures           ED Course  ED Course as of Feb 20 1530   Wed Feb 20, 2019   1524 Patient with history of orthostatic hypotension this is probably induced by diabetic autonomic insufficiency he is not orthostatic over here he wants to go home I have discussed this case with him and have him follow-up with his primary MD the back pain is something chronic he is not having any chest pain no abdominal pain his vascular system examination is negative no pulsatile mass in the abdomen he does not want to get any other workup done in the ER  [TS]      ED Course User Index  [TS] Son Cook MD                  [facility]      Final diagnoses:   Postural hypotension            Son Cook MD  02/20/19 1529       Son Cook MD  02/20/19 1530

## 2019-03-27 ENCOUNTER — HOSPITAL ENCOUNTER (EMERGENCY)
Facility: HOSPITAL | Age: 54
Discharge: HOME | End: 2019-03-27
Admitting: EMERGENCY MEDICINE

## 2019-03-27 ENCOUNTER — APPOINTMENT (OUTPATIENT)
Dept: GENERAL RADIOLOGY | Facility: HOSPITAL | Age: 54
End: 2019-03-27

## 2019-03-27 VITALS
TEMPERATURE: 98.2 F | RESPIRATION RATE: 16 BRPM | HEIGHT: 75 IN | WEIGHT: 162 LBS | SYSTOLIC BLOOD PRESSURE: 101 MMHG | BODY MASS INDEX: 20.14 KG/M2 | HEART RATE: 83 BPM | DIASTOLIC BLOOD PRESSURE: 62 MMHG | OXYGEN SATURATION: 100 %

## 2019-03-27 DIAGNOSIS — S86.912A KNEE STRAIN, LEFT, INITIAL ENCOUNTER: Primary | ICD-10-CM

## 2019-03-27 PROCEDURE — 73562 X-RAY EXAM OF KNEE 3: CPT

## 2019-03-27 PROCEDURE — 99283 EMERGENCY DEPT VISIT LOW MDM: CPT

## 2019-03-27 RX ORDER — NAPROXEN 500 MG/1
500 TABLET ORAL 2 TIMES DAILY PRN
Qty: 20 TABLET | Refills: 0 | Status: ON HOLD | OUTPATIENT
Start: 2019-03-27 | End: 2020-02-06

## 2020-01-01 ENCOUNTER — LAB REQUISITION (OUTPATIENT)
Dept: LAB | Facility: HOSPITAL | Age: 55
End: 2020-01-01

## 2020-01-01 DIAGNOSIS — Z00.00 ENCOUNTER FOR GENERAL ADULT MEDICAL EXAMINATION WITHOUT ABNORMAL FINDINGS: ICD-10-CM

## 2020-01-01 LAB
ALBUMIN SERPL-MCNC: 3.9 G/DL (ref 3.5–5.2)
ALBUMIN/GLOB SERPL: 1.5 G/DL
ALP SERPL-CCNC: 110 U/L (ref 39–117)
ALT SERPL W P-5'-P-CCNC: 37 U/L (ref 1–41)
ANION GAP SERPL CALCULATED.3IONS-SCNC: 10 MMOL/L (ref 5–15)
AST SERPL-CCNC: 29 U/L (ref 1–40)
BASOPHILS # BLD AUTO: 0.04 10*3/MM3 (ref 0–0.2)
BASOPHILS NFR BLD AUTO: 0.7 % (ref 0–1.5)
BILIRUB SERPL-MCNC: 0.2 MG/DL (ref 0–1.2)
BUN SERPL-MCNC: 26 MG/DL (ref 6–20)
BUN/CREAT SERPL: 38.2 (ref 7–25)
CALCIUM SPEC-SCNC: 9 MG/DL (ref 8.6–10.5)
CHLORIDE SERPL-SCNC: 103 MMOL/L (ref 98–107)
CO2 SERPL-SCNC: 28 MMOL/L (ref 22–29)
CREAT SERPL-MCNC: 0.68 MG/DL (ref 0.76–1.27)
CRP SERPL-MCNC: 0.42 MG/DL (ref 0–0.5)
DEPRECATED RDW RBC AUTO: 49.4 FL (ref 37–54)
EOSINOPHIL # BLD AUTO: 0.3 10*3/MM3 (ref 0–0.4)
EOSINOPHIL NFR BLD AUTO: 5.5 % (ref 0.3–6.2)
ERYTHROCYTE [DISTWIDTH] IN BLOOD BY AUTOMATED COUNT: 14.9 % (ref 12.3–15.4)
GFR SERPL CREATININE-BSD FRML MDRD: 121 ML/MIN/1.73
GLOBULIN UR ELPH-MCNC: 2.6 GM/DL
GLUCOSE SERPL-MCNC: 365 MG/DL (ref 65–99)
HCT VFR BLD AUTO: 29 % (ref 37.5–51)
HGB BLD-MCNC: 9.2 G/DL (ref 13–17.7)
IMM GRANULOCYTES # BLD AUTO: 0.02 10*3/MM3 (ref 0–0.05)
IMM GRANULOCYTES NFR BLD AUTO: 0.4 % (ref 0–0.5)
LYMPHOCYTES # BLD AUTO: 2.07 10*3/MM3 (ref 0.7–3.1)
LYMPHOCYTES NFR BLD AUTO: 38.3 % (ref 19.6–45.3)
MCH RBC QN AUTO: 28.8 PG (ref 26.6–33)
MCHC RBC AUTO-ENTMCNC: 31.7 G/DL (ref 31.5–35.7)
MCV RBC AUTO: 90.6 FL (ref 79–97)
MONOCYTES # BLD AUTO: 0.47 10*3/MM3 (ref 0.1–0.9)
MONOCYTES NFR BLD AUTO: 8.7 % (ref 5–12)
NEUTROPHILS NFR BLD AUTO: 2.51 10*3/MM3 (ref 1.7–7)
NEUTROPHILS NFR BLD AUTO: 46.4 % (ref 42.7–76)
NRBC BLD AUTO-RTO: 0 /100 WBC (ref 0–0.2)
PLATELET # BLD AUTO: 216 10*3/MM3 (ref 140–450)
PMV BLD AUTO: 8.9 FL (ref 6–12)
POTASSIUM SERPL-SCNC: 4.4 MMOL/L (ref 3.5–5.2)
PROT SERPL-MCNC: 6.5 G/DL (ref 6–8.5)
RBC # BLD AUTO: 3.2 10*6/MM3 (ref 4.14–5.8)
SODIUM SERPL-SCNC: 141 MMOL/L (ref 136–145)
WBC # BLD AUTO: 5.41 10*3/MM3 (ref 3.4–10.8)

## 2020-01-01 PROCEDURE — 85025 COMPLETE CBC W/AUTO DIFF WBC: CPT | Performed by: INTERNAL MEDICINE

## 2020-01-01 PROCEDURE — 36415 COLL VENOUS BLD VENIPUNCTURE: CPT | Performed by: INTERNAL MEDICINE

## 2020-01-01 PROCEDURE — 80053 COMPREHEN METABOLIC PANEL: CPT | Performed by: INTERNAL MEDICINE

## 2020-01-01 PROCEDURE — 86140 C-REACTIVE PROTEIN: CPT | Performed by: INTERNAL MEDICINE

## 2020-02-06 ENCOUNTER — APPOINTMENT (OUTPATIENT)
Dept: GENERAL RADIOLOGY | Facility: HOSPITAL | Age: 55
End: 2020-02-06

## 2020-02-06 ENCOUNTER — APPOINTMENT (OUTPATIENT)
Dept: CT IMAGING | Facility: HOSPITAL | Age: 55
End: 2020-02-06

## 2020-02-06 ENCOUNTER — HOSPITAL ENCOUNTER (INPATIENT)
Facility: HOSPITAL | Age: 55
LOS: 5 days | Discharge: HOME | End: 2020-02-11
Attending: INTERNAL MEDICINE | Admitting: INTERNAL MEDICINE

## 2020-02-06 DIAGNOSIS — J18.9 PNEUMONIA OF RIGHT LOWER LOBE DUE TO INFECTIOUS ORGANISM: Primary | ICD-10-CM

## 2020-02-06 DIAGNOSIS — Z78.9 DECREASED ACTIVITIES OF DAILY LIVING (ADL): ICD-10-CM

## 2020-02-06 DIAGNOSIS — R26.9 GAIT ABNORMALITY: ICD-10-CM

## 2020-02-06 PROBLEM — D72.829 LEUKOCYTOSIS: Status: ACTIVE | Noted: 2020-02-06

## 2020-02-06 PROBLEM — R07.89 RIGHT-SIDED CHEST WALL PAIN: Status: ACTIVE | Noted: 2020-02-06

## 2020-02-06 PROBLEM — D64.9 NORMOCYTIC ANEMIA: Status: ACTIVE | Noted: 2020-02-06

## 2020-02-06 PROBLEM — Z79.4 TYPE 2 DIABETES MELLITUS WITH HYPERGLYCEMIA, WITH LONG-TERM CURRENT USE OF INSULIN (HCC): Status: ACTIVE | Noted: 2020-02-06

## 2020-02-06 PROBLEM — E11.65 TYPE 2 DIABETES MELLITUS WITH HYPERGLYCEMIA, WITH LONG-TERM CURRENT USE OF INSULIN (HCC): Status: ACTIVE | Noted: 2020-02-06

## 2020-02-06 LAB
ALBUMIN SERPL-MCNC: 3.2 G/DL (ref 3.5–5.2)
ALBUMIN/GLOB SERPL: 0.9 G/DL
ALP SERPL-CCNC: 104 U/L (ref 39–117)
ALT SERPL W P-5'-P-CCNC: 11 U/L (ref 1–41)
AMYLASE SERPL-CCNC: 12 U/L (ref 28–100)
ANION GAP SERPL CALCULATED.3IONS-SCNC: 8 MMOL/L (ref 5–15)
APTT PPP: 39.4 SECONDS (ref 24.1–35)
AST SERPL-CCNC: 12 U/L (ref 1–40)
BASOPHILS # BLD AUTO: 0.03 10*3/MM3 (ref 0–0.2)
BASOPHILS NFR BLD AUTO: 0.2 % (ref 0–1.5)
BILIRUB SERPL-MCNC: 0.3 MG/DL (ref 0.2–1.2)
BILIRUB UR QL STRIP: NEGATIVE
BUN BLD-MCNC: 11 MG/DL (ref 6–20)
BUN/CREAT SERPL: 19 (ref 7–25)
CALCIUM SPEC-SCNC: 8.6 MG/DL (ref 8.6–10.5)
CHLORIDE SERPL-SCNC: 97 MMOL/L (ref 98–107)
CLARITY UR: CLEAR
CO2 SERPL-SCNC: 30 MMOL/L (ref 22–29)
COLOR UR: YELLOW
CREAT BLD-MCNC: 0.58 MG/DL (ref 0.76–1.27)
D-LACTATE SERPL-SCNC: 0.7 MMOL/L (ref 0.5–2)
DEPRECATED RDW RBC AUTO: 39.6 FL (ref 37–54)
EOSINOPHIL # BLD AUTO: 0.08 10*3/MM3 (ref 0–0.4)
EOSINOPHIL NFR BLD AUTO: 0.6 % (ref 0.3–6.2)
ERYTHROCYTE [DISTWIDTH] IN BLOOD BY AUTOMATED COUNT: 12.2 % (ref 12.3–15.4)
FERRITIN SERPL-MCNC: 687.2 NG/ML (ref 30–400)
FLUAV AG NPH QL: NEGATIVE
FLUBV AG NPH QL IA: NEGATIVE
GFR SERPL CREATININE-BSD FRML MDRD: 146 ML/MIN/1.73
GLOBULIN UR ELPH-MCNC: 3.7 GM/DL
GLUCOSE BLD-MCNC: 265 MG/DL (ref 65–99)
GLUCOSE BLDC GLUCOMTR-MCNC: 314 MG/DL (ref 70–130)
GLUCOSE UR STRIP-MCNC: ABNORMAL MG/DL
HCT VFR BLD AUTO: 27.9 % (ref 37.5–51)
HGB BLD-MCNC: 9.3 G/DL (ref 13–17.7)
HGB UR QL STRIP.AUTO: NEGATIVE
HOLD SPECIMEN: NORMAL
IMM GRANULOCYTES # BLD AUTO: 0.09 10*3/MM3 (ref 0–0.05)
IMM GRANULOCYTES NFR BLD AUTO: 0.7 % (ref 0–0.5)
INR PPP: 1.03 (ref 0.91–1.09)
IRON 24H UR-MRATE: 10 MCG/DL (ref 59–158)
IRON SATN MFR SERPL: 5 % (ref 20–50)
KETONES UR QL STRIP: ABNORMAL
L PNEUMO1 AG UR QL IA: NEGATIVE
LEUKOCYTE ESTERASE UR QL STRIP.AUTO: NEGATIVE
LIPASE SERPL-CCNC: 9 U/L (ref 13–60)
LYMPHOCYTES # BLD AUTO: 0.98 10*3/MM3 (ref 0.7–3.1)
LYMPHOCYTES NFR BLD AUTO: 7.9 % (ref 19.6–45.3)
MCH RBC QN AUTO: 29.2 PG (ref 26.6–33)
MCHC RBC AUTO-ENTMCNC: 33.3 G/DL (ref 31.5–35.7)
MCV RBC AUTO: 87.5 FL (ref 79–97)
MONOCYTES # BLD AUTO: 1.02 10*3/MM3 (ref 0.1–0.9)
MONOCYTES NFR BLD AUTO: 8.2 % (ref 5–12)
NEUTROPHILS # BLD AUTO: 10.28 10*3/MM3 (ref 1.7–7)
NEUTROPHILS NFR BLD AUTO: 82.4 % (ref 42.7–76)
NITRITE UR QL STRIP: NEGATIVE
NRBC BLD AUTO-RTO: 0 /100 WBC (ref 0–0.2)
PH UR STRIP.AUTO: 5.5 [PH] (ref 5–8)
PLATELET # BLD AUTO: 388 10*3/MM3 (ref 140–450)
PMV BLD AUTO: 8.8 FL (ref 6–12)
POTASSIUM BLD-SCNC: 4.2 MMOL/L (ref 3.5–5.2)
PROT SERPL-MCNC: 6.9 G/DL (ref 6–8.5)
PROT UR QL STRIP: ABNORMAL
PROTHROMBIN TIME: 13.8 SECONDS (ref 11.9–14.6)
RBC # BLD AUTO: 3.19 10*6/MM3 (ref 4.14–5.8)
S PNEUM AG SPEC QL LA: NEGATIVE
SODIUM BLD-SCNC: 135 MMOL/L (ref 136–145)
SP GR UR STRIP: 1.02 (ref 1–1.03)
TIBC SERPL-MCNC: 188 MCG/DL (ref 298–536)
TRANSFERRIN SERPL-MCNC: 126 MG/DL (ref 200–360)
TROPONIN T SERPL-MCNC: <0.01 NG/ML (ref 0–0.03)
UROBILINOGEN UR QL STRIP: ABNORMAL
WBC NRBC COR # BLD: 12.48 10*3/MM3 (ref 3.4–10.8)
WHOLE BLOOD HOLD SPECIMEN: NORMAL
WHOLE BLOOD HOLD SPECIMEN: NORMAL

## 2020-02-06 PROCEDURE — 93005 ELECTROCARDIOGRAM TRACING: CPT | Performed by: NURSE PRACTITIONER

## 2020-02-06 PROCEDURE — 85025 COMPLETE CBC W/AUTO DIFF WBC: CPT | Performed by: NURSE PRACTITIONER

## 2020-02-06 PROCEDURE — 80053 COMPREHEN METABOLIC PANEL: CPT | Performed by: NURSE PRACTITIONER

## 2020-02-06 PROCEDURE — 93010 ELECTROCARDIOGRAM REPORT: CPT | Performed by: INTERNAL MEDICINE

## 2020-02-06 PROCEDURE — 83690 ASSAY OF LIPASE: CPT | Performed by: NURSE PRACTITIONER

## 2020-02-06 PROCEDURE — 87040 BLOOD CULTURE FOR BACTERIA: CPT

## 2020-02-06 PROCEDURE — 84484 ASSAY OF TROPONIN QUANT: CPT | Performed by: NURSE PRACTITIONER

## 2020-02-06 PROCEDURE — 85730 THROMBOPLASTIN TIME PARTIAL: CPT | Performed by: NURSE PRACTITIONER

## 2020-02-06 PROCEDURE — 94799 UNLISTED PULMONARY SVC/PX: CPT

## 2020-02-06 PROCEDURE — 84466 ASSAY OF TRANSFERRIN: CPT | Performed by: NURSE PRACTITIONER

## 2020-02-06 PROCEDURE — 87040 BLOOD CULTURE FOR BACTERIA: CPT | Performed by: NURSE PRACTITIONER

## 2020-02-06 PROCEDURE — 82150 ASSAY OF AMYLASE: CPT | Performed by: NURSE PRACTITIONER

## 2020-02-06 PROCEDURE — 87804 INFLUENZA ASSAY W/OPTIC: CPT | Performed by: NURSE PRACTITIONER

## 2020-02-06 PROCEDURE — 83605 ASSAY OF LACTIC ACID: CPT

## 2020-02-06 PROCEDURE — 25010000002 ONDANSETRON PER 1 MG: Performed by: NURSE PRACTITIONER

## 2020-02-06 PROCEDURE — 87899 AGENT NOS ASSAY W/OPTIC: CPT | Performed by: NURSE PRACTITIONER

## 2020-02-06 PROCEDURE — 74176 CT ABD & PELVIS W/O CONTRAST: CPT

## 2020-02-06 PROCEDURE — 63710000001 INSULIN LISPRO (HUMAN) PER 5 UNITS: Performed by: NURSE PRACTITIONER

## 2020-02-06 PROCEDURE — 25010000002 CEFTRIAXONE PER 250 MG: Performed by: PHYSICIAN ASSISTANT

## 2020-02-06 PROCEDURE — 71045 X-RAY EXAM CHEST 1 VIEW: CPT

## 2020-02-06 PROCEDURE — 82607 VITAMIN B-12: CPT | Performed by: NURSE PRACTITIONER

## 2020-02-06 PROCEDURE — 82962 GLUCOSE BLOOD TEST: CPT

## 2020-02-06 PROCEDURE — 25010000002 AZITHROMYCIN PER 500 MG: Performed by: PHYSICIAN ASSISTANT

## 2020-02-06 PROCEDURE — 83540 ASSAY OF IRON: CPT | Performed by: NURSE PRACTITIONER

## 2020-02-06 PROCEDURE — 85610 PROTHROMBIN TIME: CPT | Performed by: NURSE PRACTITIONER

## 2020-02-06 PROCEDURE — 99284 EMERGENCY DEPT VISIT MOD MDM: CPT

## 2020-02-06 PROCEDURE — 82728 ASSAY OF FERRITIN: CPT | Performed by: NURSE PRACTITIONER

## 2020-02-06 PROCEDURE — 81003 URINALYSIS AUTO W/O SCOPE: CPT | Performed by: NURSE PRACTITIONER

## 2020-02-06 RX ORDER — ONDANSETRON 2 MG/ML
4 INJECTION INTRAMUSCULAR; INTRAVENOUS EVERY 6 HOURS PRN
Status: DISCONTINUED | OUTPATIENT
Start: 2020-02-06 | End: 2020-02-11 | Stop reason: HOSPADM

## 2020-02-06 RX ORDER — SODIUM CHLORIDE 0.9 % (FLUSH) 0.9 %
10 SYRINGE (ML) INJECTION AS NEEDED
Status: DISCONTINUED | OUTPATIENT
Start: 2020-02-06 | End: 2020-02-11 | Stop reason: HOSPADM

## 2020-02-06 RX ORDER — IPRATROPIUM BROMIDE AND ALBUTEROL SULFATE 2.5; .5 MG/3ML; MG/3ML
3 SOLUTION RESPIRATORY (INHALATION) EVERY 4 HOURS PRN
Status: DISCONTINUED | OUTPATIENT
Start: 2020-02-06 | End: 2020-02-06

## 2020-02-06 RX ORDER — DEXTROSE MONOHYDRATE 25 G/50ML
25 INJECTION, SOLUTION INTRAVENOUS
Status: DISCONTINUED | OUTPATIENT
Start: 2020-02-06 | End: 2020-02-11 | Stop reason: HOSPADM

## 2020-02-06 RX ORDER — SODIUM CHLORIDE 9 MG/ML
75 INJECTION, SOLUTION INTRAVENOUS CONTINUOUS
Status: DISCONTINUED | OUTPATIENT
Start: 2020-02-06 | End: 2020-02-08

## 2020-02-06 RX ORDER — IPRATROPIUM BROMIDE AND ALBUTEROL SULFATE 2.5; .5 MG/3ML; MG/3ML
3 SOLUTION RESPIRATORY (INHALATION)
Status: DISCONTINUED | OUTPATIENT
Start: 2020-02-07 | End: 2020-02-07

## 2020-02-06 RX ORDER — ACETAMINOPHEN 160 MG/5ML
650 SOLUTION ORAL EVERY 4 HOURS PRN
Status: DISCONTINUED | OUTPATIENT
Start: 2020-02-06 | End: 2020-02-06

## 2020-02-06 RX ORDER — NICOTINE POLACRILEX 4 MG
15 LOZENGE BUCCAL
Status: DISCONTINUED | OUTPATIENT
Start: 2020-02-06 | End: 2020-02-11 | Stop reason: HOSPADM

## 2020-02-06 RX ORDER — IPRATROPIUM BROMIDE AND ALBUTEROL SULFATE 2.5; .5 MG/3ML; MG/3ML
3 SOLUTION RESPIRATORY (INHALATION) EVERY 4 HOURS PRN
Status: DISCONTINUED | OUTPATIENT
Start: 2020-02-06 | End: 2020-02-08

## 2020-02-06 RX ORDER — IPRATROPIUM BROMIDE AND ALBUTEROL SULFATE 2.5; .5 MG/3ML; MG/3ML
3 SOLUTION RESPIRATORY (INHALATION)
Status: DISCONTINUED | OUTPATIENT
Start: 2020-02-06 | End: 2020-02-06

## 2020-02-06 RX ORDER — ONDANSETRON 4 MG/1
4 TABLET, FILM COATED ORAL EVERY 6 HOURS PRN
Status: DISCONTINUED | OUTPATIENT
Start: 2020-02-06 | End: 2020-02-11 | Stop reason: HOSPADM

## 2020-02-06 RX ORDER — ONDANSETRON 2 MG/ML
4 INJECTION INTRAMUSCULAR; INTRAVENOUS ONCE
Status: COMPLETED | OUTPATIENT
Start: 2020-02-06 | End: 2020-02-06

## 2020-02-06 RX ORDER — IBUPROFEN 800 MG/1
800 TABLET ORAL ONCE
Status: COMPLETED | OUTPATIENT
Start: 2020-02-06 | End: 2020-02-06

## 2020-02-06 RX ORDER — HYDROCODONE BITARTRATE AND ACETAMINOPHEN 5; 325 MG/1; MG/1
1 TABLET ORAL EVERY 4 HOURS PRN
Status: DISCONTINUED | OUTPATIENT
Start: 2020-02-06 | End: 2020-02-09

## 2020-02-06 RX ORDER — ACETAMINOPHEN 650 MG/1
650 SUPPOSITORY RECTAL EVERY 4 HOURS PRN
Status: DISCONTINUED | OUTPATIENT
Start: 2020-02-06 | End: 2020-02-11 | Stop reason: HOSPADM

## 2020-02-06 RX ORDER — ACETAMINOPHEN 325 MG/1
650 TABLET ORAL EVERY 4 HOURS PRN
Status: DISCONTINUED | OUTPATIENT
Start: 2020-02-06 | End: 2020-02-11 | Stop reason: HOSPADM

## 2020-02-06 RX ORDER — SODIUM CHLORIDE 0.9 % (FLUSH) 0.9 %
10 SYRINGE (ML) INJECTION EVERY 12 HOURS SCHEDULED
Status: DISCONTINUED | OUTPATIENT
Start: 2020-02-06 | End: 2020-02-11 | Stop reason: HOSPADM

## 2020-02-06 RX ADMIN — ONDANSETRON HYDROCHLORIDE 4 MG: 2 SOLUTION INTRAMUSCULAR; INTRAVENOUS at 14:42

## 2020-02-06 RX ADMIN — IBUPROFEN 800 MG: 800 TABLET, FILM COATED ORAL at 14:57

## 2020-02-06 RX ADMIN — HYDROMORPHONE HYDROCHLORIDE 1 MG: 1 INJECTION, SOLUTION INTRAMUSCULAR; INTRAVENOUS; SUBCUTANEOUS at 14:41

## 2020-02-06 RX ADMIN — SODIUM CHLORIDE, PRESERVATIVE FREE 10 ML: 5 INJECTION INTRAVENOUS at 22:59

## 2020-02-06 RX ADMIN — SODIUM CHLORIDE 1000 ML: 9 INJECTION, SOLUTION INTRAVENOUS at 15:20

## 2020-02-06 RX ADMIN — INSULIN LISPRO 10 UNITS: 100 INJECTION, SOLUTION INTRAVENOUS; SUBCUTANEOUS at 22:59

## 2020-02-06 RX ADMIN — SODIUM CHLORIDE 1 G: 900 INJECTION INTRAVENOUS at 16:43

## 2020-02-06 RX ADMIN — AZITHROMYCIN MONOHYDRATE 500 MG: 500 INJECTION, POWDER, LYOPHILIZED, FOR SOLUTION INTRAVENOUS at 17:50

## 2020-02-06 RX ADMIN — SODIUM CHLORIDE 100 ML/HR: 9 INJECTION, SOLUTION INTRAVENOUS at 22:59

## 2020-02-06 RX ADMIN — IPRATROPIUM BROMIDE AND ALBUTEROL SULFATE 3 ML: 2.5; .5 SOLUTION RESPIRATORY (INHALATION) at 20:42

## 2020-02-06 NOTE — ED PROVIDER NOTES
Subjective   54 yom c/o 'right sided' pain. Onset 3 days ago.  He has a fever but is unsure when they started.  He denies n/v/d. He states the pain in his side radiates into his chest.  He adds he has a cough but denies sore throat or runny nose.  He denies CP or SOB          Review of Systems   Constitutional: Positive for fever. Negative for activity change, appetite change and fatigue.   HENT: Negative for congestion, ear pain, facial swelling and sore throat.    Eyes: Negative for discharge and visual disturbance.   Respiratory: Positive for cough. Negative for apnea, chest tightness, shortness of breath, wheezing and stridor.    Cardiovascular: Negative for chest pain and palpitations.   Gastrointestinal: Positive for abdominal pain. Negative for abdominal distention, diarrhea, nausea and vomiting.   Genitourinary: Negative for difficulty urinating and dysuria.   Musculoskeletal: Negative for arthralgias and myalgias.   Skin: Negative for rash and wound.   Neurological: Negative for dizziness and seizures.   Psychiatric/Behavioral: Negative for agitation and confusion.       Past Medical History:   Diagnosis Date   • Autonomic neuropathy    • Chronic diarrhea    • Diabetes mellitus (CMS/HCC)    • Diabetic foot ulcer (CMS/HCC)    • Elevated cholesterol    • Lateral epicondylitis, right elbow    • Noncompliance    • Orthostatic hypotension    • Skin infection        No Known Allergies    Past Surgical History:   Procedure Laterality Date   • TONSILLECTOMY         Family History   Problem Relation Age of Onset   • Diabetes Mother    • Arthritis Mother    • Hyperlipidemia Mother    • Heart disease Father    • Diabetes Father    • Arthritis Father        Social History     Socioeconomic History   • Marital status:      Spouse name: Not on file   • Number of children: Not on file   • Years of education: Not on file   • Highest education level: Not on file   Tobacco Use   • Smoking status: Never Smoker    Substance and Sexual Activity   • Alcohol use: No   • Drug use: No           Objective   Physical Exam   Constitutional: He is oriented to person, place, and time. He appears well-developed.   HENT:   Head: Normocephalic.   Eyes: Pupils are equal, round, and reactive to light. EOM are normal.   Neck: Normal range of motion. Neck supple.   Cardiovascular: Normal rate and regular rhythm.   No murmur heard.  Pulmonary/Chest: Effort normal and breath sounds normal.   Abdominal: Soft. Bowel sounds are normal. There is no tenderness.   Musculoskeletal: Normal range of motion.   Neurological: He is alert and oriented to person, place, and time.   Skin: Skin is warm and dry.   Psychiatric: He has a normal mood and affect.   Nursing note and vitals reviewed.      Procedures           ED Course  ED Course as of Feb 07 0820   Thu Feb 06, 2020   1453 Transfer of care to Yehuda PONCE at 1500    [KS]      ED Course User Index  [KS] Mary Lopez APRN                                               [facility]    Final diagnoses:   Pneumonia of right lower lobe due to infectious organism (CMS/[facility])            Mary Lopez APRN  02/07/20 0820

## 2020-02-06 NOTE — ED PROVIDER NOTES
Subjective   History of Present Illness    Review of Systems    Past Medical History:   Diagnosis Date   • Autonomic neuropathy    • Chronic diarrhea    • Diabetes mellitus (CMS/HCC)    • Diabetic foot ulcer (CMS/HCC)    • Lateral epicondylitis, right elbow    • Noncompliance    • Orthostatic hypotension    • Skin infection        No Known Allergies    Past Surgical History:   Procedure Laterality Date   • TONSILLECTOMY         Family History   Problem Relation Age of Onset   • Diabetes Mother    • Arthritis Mother    • Hyperlipidemia Mother    • Heart disease Father    • Diabetes Father    • Arthritis Father        Social History     Socioeconomic History   • Marital status:      Spouse name: Not on file   • Number of children: Not on file   • Years of education: Not on file   • Highest education level: Not on file   Tobacco Use   • Smoking status: Never Smoker   Substance and Sexual Activity   • Alcohol use: No   • Drug use: No           Objective   Physical Exam    Procedures           ED Course  ED Course as of Feb 06 1712   Thu Feb 06, 2020   1453 Transfer of care to Yehuda PONCE at 1500    [KS]      ED Course User Index  [KS] Shoulders, Kristhellen Tim, APRN           Labs Reviewed   COMPREHENSIVE METABOLIC PANEL - Abnormal; Notable for the following components:       Result Value    Glucose 265 (*)     Creatinine 0.58 (*)     Sodium 135 (*)     Chloride 97 (*)     CO2 30.0 (*)     Albumin 3.20 (*)     All other components within normal limits    Narrative:     GFR Normal >60  Chronic Kidney Disease <60  Kidney Failure <15     APTT - Abnormal; Notable for the following components:    PTT 39.4 (*)     All other components within normal limits   LIPASE - Abnormal; Notable for the following components:    Lipase 9 (*)     All other components within normal limits   AMYLASE - Abnormal; Notable for the following components:    Amylase 12 (*)     All other components within normal limits   CBC WITH AUTO  DIFFERENTIAL - Abnormal; Notable for the following components:    WBC 12.48 (*)     RBC 3.19 (*)     Hemoglobin 9.3 (*)     Hematocrit 27.9 (*)     RDW 12.2 (*)     Neutrophil % 82.4 (*)     Lymphocyte % 7.9 (*)     Immature Grans % 0.7 (*)     Neutrophils, Absolute 10.28 (*)     Monocytes, Absolute 1.02 (*)     Immature Grans, Absolute 0.09 (*)     All other components within normal limits   INFLUENZA ANTIGEN, RAPID - Normal    Narrative:     Recommend confirmation of negative results by viral culture or molecular assay.   PROTIME-INR - Normal   LACTIC ACID, PLASMA - Normal   TROPONIN (IN-HOUSE) - Normal    Narrative:     Troponin T Reference Range:  <= 0.03 ng/mL-   Negative for AMI  >0.03 ng/mL-     Abnormal for myocardial necrosis.  Clinicians would have to utilize clinical acumen, EKG, Troponin and serial changes to determine if it is an Acute Myocardial Infarction or myocardial injury due to an underlying chronic condition.       Results may be falsely decreased if patient taking Biotin.     BLOOD CULTURE   BLOOD CULTURE   RAINBOW DRAW    Narrative:     The following orders were created for panel order Norman Draw.  Procedure                               Abnormality         Status                     ---------                               -----------         ------                     Light Blue Top[[id number]]                                   Final result               Green Top (Gel)[[id number]]                                  Final result               Lavender Top[[id number]]                                     Final result               Red Top[[id number]]                                          Final result               Norman Blood Culture Alonso...[[id number]]                      Final result               Gray Top - Ice[[id number]]                                   Final result                 Please view results for these tests on the individual orders.   GRAY TOP - ICE   URINALYSIS W/ CULTURE IF  INDICATED   LIGHT BLUE TOP   GREEN TOP   LAVENDER TOP   RED TOP   RAINBOW BLOOD CULTURE BOTTLES - 1 SET   CBC AND DIFFERENTIAL    Narrative:     The following orders were created for panel order CBC & Differential.  Procedure                               Abnormality         Status                     ---------                               -----------         ------                     CBC Auto Differential[[id number]]        Abnormal            Final result                 Please view results for these tests on the individual orders.     XR Chest 1 View   Final Result       Multifocal bilateral airspace opacity, suspicious for multifocal   pneumonia. Recommend follow-up imaging to document resolution and   exclude neoplastic process.   This report was finalized on 02/06/2020 16:13 by Dr. Jared Cerrato MD.      CT Abdomen Pelvis Without Contrast   Final Result   1. Infiltrative masslike area of airspace consolidation in the right   lower lobe, pleural-based with focal cystic change centrally.   Differential considerations include infectious/inflammatory change,   pneumonia, as well as pulmonary neoplasm/malignancy. Correlate with   patient presentation, follow-up recommended.   2. Atherosclerotic calcifications.   3. No CT evidence of acute intra-abdominal/pelvic pathological process.   This report was finalized on 02/06/2020 16:04 by Dr. Cortes Ruff MD.                                            MDM  Number of Diagnoses or Management Options  Diagnosis management comments: See DENISHA Faulkner note for HPI PE ROS     Admit to hospitalist for PNA        Amount and/or Complexity of Data Reviewed  Clinical lab tests: reviewed and ordered  Tests in the radiology section of CPT®: ordered and reviewed  Tests in the medicine section of CPT®: ordered and reviewed  Decide to obtain previous medical records or to obtain history from someone other than the patient: yes    Risk of Complications, Morbidity, and/or  Mortality  Presenting problems: moderate  Diagnostic procedures: moderate  Management options: moderate    Patient Progress  Patient progress: stable      Final diagnoses:   Pneumonia of right lower lobe due to infectious organism (CMS/[facility])            Yehuda Pro PA-C  02/06/20 4159

## 2020-02-07 LAB
ANION GAP SERPL CALCULATED.3IONS-SCNC: 10 MMOL/L (ref 5–15)
BASOPHILS # BLD AUTO: 0.05 10*3/MM3 (ref 0–0.2)
BASOPHILS NFR BLD AUTO: 0.3 % (ref 0–1.5)
BUN BLD-MCNC: 10 MG/DL (ref 6–20)
BUN/CREAT SERPL: 19.2 (ref 7–25)
CALCIUM SPEC-SCNC: 8.5 MG/DL (ref 8.6–10.5)
CHLORIDE SERPL-SCNC: 102 MMOL/L (ref 98–107)
CHOLEST SERPL-MCNC: 114 MG/DL (ref 0–200)
CO2 SERPL-SCNC: 29 MMOL/L (ref 22–29)
CREAT BLD-MCNC: 0.52 MG/DL (ref 0.76–1.27)
DEPRECATED RDW RBC AUTO: 39.9 FL (ref 37–54)
EOSINOPHIL # BLD AUTO: 0.05 10*3/MM3 (ref 0–0.4)
EOSINOPHIL NFR BLD AUTO: 0.3 % (ref 0.3–6.2)
ERYTHROCYTE [DISTWIDTH] IN BLOOD BY AUTOMATED COUNT: 12.4 % (ref 12.3–15.4)
GFR SERPL CREATININE-BSD FRML MDRD: >150 ML/MIN/1.73
GLUCOSE BLD-MCNC: 285 MG/DL (ref 65–99)
GLUCOSE BLDC GLUCOMTR-MCNC: 295 MG/DL (ref 70–130)
GLUCOSE BLDC GLUCOMTR-MCNC: 298 MG/DL (ref 70–130)
GLUCOSE BLDC GLUCOMTR-MCNC: 340 MG/DL (ref 70–130)
GLUCOSE BLDC GLUCOMTR-MCNC: 414 MG/DL (ref 70–130)
GLUCOSE BLDC GLUCOMTR-MCNC: 463 MG/DL (ref 70–130)
HBA1C MFR BLD: 15 % (ref 4.8–5.6)
HCT VFR BLD AUTO: 27.6 % (ref 37.5–51)
HDLC SERPL-MCNC: 33 MG/DL (ref 40–60)
HGB BLD-MCNC: 9.1 G/DL (ref 13–17.7)
IMM GRANULOCYTES # BLD AUTO: 0.09 10*3/MM3 (ref 0–0.05)
IMM GRANULOCYTES NFR BLD AUTO: 0.6 % (ref 0–0.5)
LDLC SERPL CALC-MCNC: 64 MG/DL (ref 0–100)
LDLC/HDLC SERPL: 1.93 {RATIO}
LYMPHOCYTES # BLD AUTO: 1.13 10*3/MM3 (ref 0.7–3.1)
LYMPHOCYTES NFR BLD AUTO: 7 % (ref 19.6–45.3)
MCH RBC QN AUTO: 29 PG (ref 26.6–33)
MCHC RBC AUTO-ENTMCNC: 33 G/DL (ref 31.5–35.7)
MCV RBC AUTO: 87.9 FL (ref 79–97)
MONOCYTES # BLD AUTO: 1.18 10*3/MM3 (ref 0.1–0.9)
MONOCYTES NFR BLD AUTO: 7.3 % (ref 5–12)
NEUTROPHILS # BLD AUTO: 13.57 10*3/MM3 (ref 1.7–7)
NEUTROPHILS NFR BLD AUTO: 84.5 % (ref 42.7–76)
NRBC BLD AUTO-RTO: 0 /100 WBC (ref 0–0.2)
PLATELET # BLD AUTO: 368 10*3/MM3 (ref 140–450)
PMV BLD AUTO: 8.9 FL (ref 6–12)
POTASSIUM BLD-SCNC: 4.3 MMOL/L (ref 3.5–5.2)
RBC # BLD AUTO: 3.14 10*6/MM3 (ref 4.14–5.8)
SODIUM BLD-SCNC: 141 MMOL/L (ref 136–145)
TRIGL SERPL-MCNC: 87 MG/DL (ref 0–150)
VIT B12 BLD-MCNC: 341 PG/ML (ref 211–946)
VLDLC SERPL-MCNC: 17.4 MG/DL
WBC NRBC COR # BLD: 16.07 10*3/MM3 (ref 3.4–10.8)

## 2020-02-07 PROCEDURE — 94664 DEMO&/EVAL PT USE INHALER: CPT

## 2020-02-07 PROCEDURE — 82962 GLUCOSE BLOOD TEST: CPT

## 2020-02-07 PROCEDURE — 99222 1ST HOSP IP/OBS MODERATE 55: CPT | Performed by: INTERNAL MEDICINE

## 2020-02-07 PROCEDURE — 94799 UNLISTED PULMONARY SVC/PX: CPT

## 2020-02-07 PROCEDURE — 25010000002 CEFTRIAXONE PER 250 MG: Performed by: NURSE PRACTITIONER

## 2020-02-07 PROCEDURE — 80061 LIPID PANEL: CPT | Performed by: NURSE PRACTITIONER

## 2020-02-07 PROCEDURE — 83036 HEMOGLOBIN GLYCOSYLATED A1C: CPT | Performed by: NURSE PRACTITIONER

## 2020-02-07 PROCEDURE — 94640 AIRWAY INHALATION TREATMENT: CPT

## 2020-02-07 PROCEDURE — 85025 COMPLETE CBC W/AUTO DIFF WBC: CPT | Performed by: NURSE PRACTITIONER

## 2020-02-07 PROCEDURE — 97165 OT EVAL LOW COMPLEX 30 MIN: CPT

## 2020-02-07 PROCEDURE — 25010000002 IRON SUCROSE PER 1 MG: Performed by: INTERNAL MEDICINE

## 2020-02-07 PROCEDURE — 63710000001 INSULIN LISPRO (HUMAN) PER 5 UNITS: Performed by: NURSE PRACTITIONER

## 2020-02-07 PROCEDURE — 25010000002 AZITHROMYCIN PER 500 MG: Performed by: NURSE PRACTITIONER

## 2020-02-07 PROCEDURE — 80048 BASIC METABOLIC PNL TOTAL CA: CPT | Performed by: NURSE PRACTITIONER

## 2020-02-07 PROCEDURE — 63710000001 INSULIN DETEMIR PER 5 UNITS: Performed by: INTERNAL MEDICINE

## 2020-02-07 RX ORDER — INSULIN GLARGINE 100 [IU]/ML
50 INJECTION, SOLUTION SUBCUTANEOUS NIGHTLY
Status: ON HOLD | COMMUNITY
End: 2020-02-11 | Stop reason: SDUPTHER

## 2020-02-07 RX ORDER — ATORVASTATIN CALCIUM 20 MG/1
20 TABLET, FILM COATED ORAL DAILY
COMMUNITY

## 2020-02-07 RX ORDER — MIDODRINE HYDROCHLORIDE 10 MG/1
10 TABLET ORAL 2 TIMES DAILY
COMMUNITY

## 2020-02-07 RX ORDER — IPRATROPIUM BROMIDE AND ALBUTEROL SULFATE 2.5; .5 MG/3ML; MG/3ML
3 SOLUTION RESPIRATORY (INHALATION) EVERY 4 HOURS PRN
Status: DISCONTINUED | OUTPATIENT
Start: 2020-02-07 | End: 2020-02-11 | Stop reason: HOSPADM

## 2020-02-07 RX ORDER — DICYCLOMINE HCL 20 MG
20 TABLET ORAL 4 TIMES DAILY PRN
COMMUNITY

## 2020-02-07 RX ORDER — DIPHENOXYLATE HYDROCHLORIDE AND ATROPINE SULFATE 2.5; .025 MG/1; MG/1
1-2 TABLET ORAL 3 TIMES DAILY PRN
COMMUNITY

## 2020-02-07 RX ADMIN — INSULIN LISPRO 14 UNITS: 100 INJECTION, SOLUTION INTRAVENOUS; SUBCUTANEOUS at 18:01

## 2020-02-07 RX ADMIN — SODIUM CHLORIDE 75 ML/HR: 9 INJECTION, SOLUTION INTRAVENOUS at 18:04

## 2020-02-07 RX ADMIN — INSULIN LISPRO 8 UNITS: 100 INJECTION, SOLUTION INTRAVENOUS; SUBCUTANEOUS at 12:32

## 2020-02-07 RX ADMIN — SODIUM CHLORIDE 100 ML/HR: 9 INJECTION, SOLUTION INTRAVENOUS at 08:29

## 2020-02-07 RX ADMIN — HYDROCODONE BITARTRATE AND ACETAMINOPHEN 1 TABLET: 5; 325 TABLET ORAL at 13:22

## 2020-02-07 RX ADMIN — HYDROCODONE BITARTRATE AND ACETAMINOPHEN 1 TABLET: 5; 325 TABLET ORAL at 21:25

## 2020-02-07 RX ADMIN — HYDROCODONE BITARTRATE AND ACETAMINOPHEN 1 TABLET: 5; 325 TABLET ORAL at 02:44

## 2020-02-07 RX ADMIN — CEFTRIAXONE 1 G: 1 INJECTION, POWDER, FOR SOLUTION INTRAMUSCULAR; INTRAVENOUS at 15:13

## 2020-02-07 RX ADMIN — IRON SUCROSE 200 MG: 20 INJECTION, SOLUTION INTRAVENOUS at 11:31

## 2020-02-07 RX ADMIN — AZITHROMYCIN MONOHYDRATE 500 MG: 500 INJECTION, POWDER, LYOPHILIZED, FOR SOLUTION INTRAVENOUS at 15:13

## 2020-02-07 RX ADMIN — INSULIN DETEMIR 10 UNITS: 100 INJECTION, SOLUTION SUBCUTANEOUS at 20:11

## 2020-02-07 RX ADMIN — INSULIN LISPRO 10 UNITS: 100 INJECTION, SOLUTION INTRAVENOUS; SUBCUTANEOUS at 08:22

## 2020-02-07 RX ADMIN — INSULIN LISPRO 8 UNITS: 100 INJECTION, SOLUTION INTRAVENOUS; SUBCUTANEOUS at 20:09

## 2020-02-07 RX ADMIN — IPRATROPIUM BROMIDE AND ALBUTEROL SULFATE 3 ML: 2.5; .5 SOLUTION RESPIRATORY (INHALATION) at 06:46

## 2020-02-07 RX ADMIN — SODIUM CHLORIDE, PRESERVATIVE FREE 10 ML: 5 INJECTION INTRAVENOUS at 20:10

## 2020-02-07 RX ADMIN — HYDROCODONE BITARTRATE AND ACETAMINOPHEN 1 TABLET: 5; 325 TABLET ORAL at 06:54

## 2020-02-07 NOTE — PROGRESS NOTES
RT Nebulizer Protocol    Assessment tool to be used for patients with existing breathing treatments ordered by hospitalists                                                                  0  1  2  3  4      Respiratory History   No Smoking   x   Smoking History      1 Pack/Day      Pulmonary Disease      Exacerbation        Respiratory Rate   Normal   x   20-25      Dyspneic      Accessory Muscles      Severe Dyspnea        Breath Sounds   Clear   x   Crackles      Crackles/ Rhonchi      Wheezing      Absent/ Severe Wheezing        Chest   X-ray   Clear      1 Lobe Infiltration/ Consolidation/ PE      2 Lobe Same Lung Infiltration/ Consolidation/ PE    x   2 Lobe Infiltration/ Both Lungs/ Consolidation/ PE      Both Lungs/ More Than 1 Lobe/ Atelectasis/ Consolidation/  PE        Cough   Strong Non- Productive   x   Excessive Secretions/ Strong Cough      Excessive Secretions/ Weak Cough      Thick Bronchial Secretions/ Weak Cough      Thick Bronchial Secretions/ No Cough        Total Patient Score =  2  0-4=Q4 PRN  5-9=TID and Q4 PRN  10-14=QID and Q3 PRN  15-19=Q4 and Q2 PRN  20=Q3 and Q2 PRN    Bronchopulmonary Hygiene (CPT)   Q4 ATC Copious secretions, dyspnea, unable to sleep, mucus plug    QID & Q4 PRN Moderate secretions    TID Small amounts of secretions w/ poor cough and history of secretions    BID Unable to deep breathe and cough spontaneously       Lung Expansion Therapy (PEP)   Q4 & PRN at night Severe atelectasis, poor oxygenation    QID  High risk for persistent atelectasis, existence of same    TID At risk for developing atelectasis x   BID Unable to deep breathe and cough spontaneously     Instruct, 1 follow up Patients able to perform well on their own    Patient has been assessed and treatments will be changed to QID and Q4 PRN Esperanza.

## 2020-02-07 NOTE — PLAN OF CARE
Problem: Patient Care Overview  Goal: Plan of Care Review  2/7/2020 1522 by Sonia Patton  Outcome: Ongoing (interventions implemented as appropriate)  Flowsheets  Taken 2/7/2020 1519  Plan of Care Reviewed With: patient  Taken 2/7/2020 1522  Outcome Summary: Initial RDN eval. Per review of records, no significant wt loss over the past year, however Pt does report wt loss of ~100 lbs over the past 2 yrs. Muscle and fat loss noted on malnutrition assessment NFPE; Pt adamantly refuses nutritional supplements. He has very limited understanding of [facility] diet; did provide education and encouraged adherence. Will continue to follow.

## 2020-02-07 NOTE — THERAPY EVALUATION
"Acute Care - Occupational Therapy Initial Evaluation  [facility]     Patient Name: Ford High  : 1965  MRN: [id number]  Today's Date: 2020  Onset of Illness/Injury or Date of Surgery: 20  Date of Referral to OT: 20  Referring Physician: DENISHA Bassett    Admit Date: 2020       ICD-10-CM ICD-9-CM   1. Pneumonia of right lower lobe due to infectious organism (CMS/HCC) J18.1 486   2. Decreased activities of daily living (ADL) Z78.9 V49.89     Patient Active Problem List   Diagnosis   • Pneumonia of right lower lobe due to infectious organism (CMS/HCC)   • Type 2 diabetes mellitus with hyperglycemia, with long-term current use of insulin (CMS/HCC)   • Leukocytosis   • Normocytic anemia   • Right-sided chest wall pain     Past Medical History:   Diagnosis Date   • Autonomic neuropathy    • Chronic diarrhea    • Diabetes mellitus (CMS/HCC)    • Diabetic foot ulcer (CMS/HCC)    • Elevated cholesterol    • Lateral epicondylitis, right elbow    • Noncompliance    • Orthostatic hypotension    • Skin infection      Past Surgical History:   Procedure Laterality Date   • TONSILLECTOMY            OT ASSESSMENT FLOWSHEET (last 12 hours)      Occupational Therapy Evaluation     Row Name 20 0841                   OT Evaluation Time/Intention    Subjective Information  complains of;pain;weakness \"always weak\"  -MW        Document Type  evaluation  -MW        Mode of Treatment  occupational therapy  -MW        Comment  Pt reports he does not walk, however reports only using his wc in his home and walks to his car. Reports he does drive and do his own IADL. Very difficult historian and unsure PLOF. Pt is impulsive and reluctant to all fall prevention protocol requiring increased encouragement/education  -MW           General Information    Patient Profile Reviewed?  yes  -MW        Onset of Illness/Injury or Date of Surgery  20  -MW        Referring Physician  DENISHA Bassett  -MW        " Patient Observations  alert;cooperative;agree to therapy  -MW        Patient/Family Observations  no family present  -MW        General Observations of Patient  asleep but arouses to verbal stim  -MW        Prior Level of Function  independent:;all household mobility;ADL's;driving;shopping pt reports he does not walk but does not use wc outside home  -MW        Equipment Currently Used at Home  wheelchair;grab bar  -MW        Pertinent History of Current Functional Problem  Presented to ED with R sided flank pain; dx; PNA  -MW        Existing Precautions/Restrictions  fall  -MW        Limitations/Impairments  safety/cognitive  -MW           Relationship/Environment    Name of Support/Comfort Primary Source  Spouse lives in nursing facility  -MW        Lives With  alone  -MW           Resource/Environmental Concerns    Current Living Arrangements  home/apartment/[facility]  -MW           Cognitive Assessment/Interventions    Additional Documentation  Cognitive Assessment/Intervention (Group)  -MW           Cognitive Assessment/Intervention- PT/OT    Orientation Status (Cognition)  oriented x 4  -MW        Cognitive Function (Cognitive)  safety deficit  -MW        Safety Deficit (Cognitive)  impulsivity;problem solving;insight into deficits/self awareness;awareness of need for assistance  -MW        Personal Safety Interventions  fall prevention program maintained;gait belt;muscle strengthening facilitated;nonskid shoes/slippers when out of bed;supervised activity  -MW           Safety Issues, Functional Mobility    Impairments Affecting Function (Mobility)  balance;endurance/activity tolerance;pain;strength;cognition  -MW           Bed Mobility Assessment/Treatment    Bed Mobility Assessment/Treatment  supine-sit  -MW        Supine-Sit [place] (Bed Mobility)  supervision  -MW           Functional Mobility    Functional Mobility- Ind. Level  supervision required  -MW        Functional Mobility- Comment   impulsively stands and takes a couple steps bed to chair  -           Transfer Assessment/Treatment    Transfer Assessment/Treatment  sit-stand transfer  -        Comment (Transfers)  impulsive with transfer  -           Sit-Stand Transfer    Sit-Stand [place] (Transfers)  supervision  -           ADL Assessment/Intervention    BADL Assessment/Intervention  lower body dressing  -           Lower Body Dressing Assessment/Training    Lower Body Dressing [place] Level  don;socks;undergarment;set up;supervision  -        Lower Body Dressing Position  supported sitting  -           BADL Safety/Performance    Impairments, BADL Safety/Performance  balance;cognition;endurance/activity tolerance;pain;strength  -           General ROM    GENERAL ROM COMMENTS  AROM WFL BUE  -           MMT (Manual Muscle Testing)    General MMT Comments  BUE functionally 4-/5, refused formal assessment  -           Motor Assessment/Interventions    Additional Documentation  Balance (Group)  -           Balance    Balance  static sitting balance;static standing balance  -           Static Sitting Balance    Level of [place] (Unsupported Sitting, Static Balance)  supervision  -        Sitting Position (Unsupported Sitting, Static Balance)  sitting on edge of bed  -           Static Standing Balance    Level of [place] (Supported Standing, Static Balance)  supervision  -        Comment (Supported Standing, Static Balance)  stood for very short time only to impulsively transfer to chair  -           Sensory Assessment/Intervention    Sensory General Assessment  no sensation deficits identified  -           Positioning and Restraints    Pre-Treatment Position  in bed  -        Post Treatment Position  chair  -        In Chair  sitting;call light within reach;encouraged to call for assist;exit alarm on;with other staff  -           Pain Assessment    Additional Documentation  Pain Scale:  "Numbers Pre/Post-Treatment (Group)  -MW           Pain Scale: Numbers Pre/Post-Treatment    Pain Scale: Numbers, Pretreatment  10/10  -MW        Pain Scale: Numbers, Post-Treatment  3/10  -MW        Pain Location - Side  Right  -MW        Pain Location  flank  -MW        Pre/Post Treatment Pain Comment  inconsistent reports of pain, no obs signs of pain  -MW        Pain Intervention(s)  Medication (See MAR);Repositioned;Ambulation/increased activity  -MW           Plan of Care Review    Plan of Care Reviewed With  patient  -MW        Progress  no change  -MW        Outcome Summary  OT eval completed. Pt is a poor historian, unsure of PLOF as pt reports he does not/is not able to walk however does report that he leaves his wheelchair in his home, walks to car, and drives. Pt reports that he runs any errands he needs and cooks his own meals. Demos decreased safety awareness and impulsivity with transfer and taking a couple steps bed>chair, reluctant toward education for fall prevention. Performs LB ADL with set up and S. Pt refuses further evaluation at this time reporting \"I cannot walk, this is all I can do\". OT indicated to address ADL and functional mobility/transfer to increase independence and safety while decreasing fall risk. Pt would benefit from SNF at d/c for continued rehab as he lives alone and is at an increased risk for falls.  -MW           Clinical Impression (OT)    Date of Referral to OT  02/07/20  -MW        OT Diagnosis  decreased ADL  -MW        Prognosis (OT Eval)  good  -MW        Patient/Family Goals Statement (OT Eval)  return home, decrease pain  -MW        Criteria for Skilled Therapeutic Interventions Met (OT Eval)  yes;treatment indicated  -MW        Rehab Potential (OT Eval)  good, to achieve stated therapy goals  -MW        Therapy Frequency (OT Eval)  3 times/wk  -MW        Predicted Duration of Therapy Intervention (Therapy Eval)  until d/c  -MW        Care Plan Review (OT)  " evaluation/treatment results reviewed;care plan/treatment goals reviewed;risks/benefits reviewed;patient/other agree to care plan;current/potential barriers reviewed  -MW        Anticipated Discharge Disposition (OT)  skilled nursing facility;transitional care  -MW           Planned OT Interventions    Planned Therapy Interventions (OT Eval)  activity tolerance training;BADL retraining;cognitive/visual perception retraining;functional balance retraining;occupation/activity based interventions;patient/caregiver education/training;transfer/mobility retraining;strengthening exercise  -MW           OT Goals    Transfer Goal Selection (OT)  transfer, OT goal 1  -MW        Bathing Goal Selection (OT)  bathing, OT goal 1  -MW        Toileting Goal Selection (OT)  toileting, OT goal 1  -MW           Transfer Goal 1 (OT)    Activity/Assistive Device (Transfer Goal 1, OT)  sit-to-stand/stand-to-sit;bed-to-chair/chair-to-bed;toilet;shower chair;walk-in shower  -MW        Arena Level/Cues Needed (Transfer Goal 1, OT)  conditional independence  -MW        Time Frame (Transfer Goal 1, OT)  long term goal (LTG);by discharge  -MW        Progress/Outcome (Transfer Goal 1, OT)  goal ongoing  -MW           Bathing Goal 1 (OT)    Activity/Assistive Device (Bathing Goal 1, OT)  bathing skills, all;shower chair  -MW        Arena Level/Cues Needed (Bathing Goal 1, OT)  supervision required  -MW        Time Frame (Bathing Goal 1, OT)  long term goal (LTG);by discharge  -MW        Progress/Outcomes (Bathing Goal 1, OT)  goal ongoing  -MW           Toileting Goal 1 (OT)    Activity/Device (Toileting Goal 1, OT)  toileting skills, all;commode  -MW        Arena Level/Cues Needed (Toileting Goal 1, OT)  conditional independence  -MW        Time Frame (Toileting Goal 1, OT)  long term goal (LTG);by discharge  -MW        Progress/Outcome (Toileting Goal 1, OT)  goal ongoing  -MW           Living Environment    Home  "Accessibility  tub/shower is not walk in elevator at [facility]  -          User Key  (r) = Recorded By, (t) = Taken By, (c) = Cosigned By    Initials Name Effective Dates    Jacqueline Nguyen, OTR/L 08/28/18 -                OT Recommendation and Plan  Outcome Summary/Treatment Plan (OT)  Anticipated Discharge Disposition (OT): skilled nursing facility, transitional care  Planned Therapy Interventions (OT Eval): activity tolerance training, BADL retraining, cognitive/visual perception retraining, functional balance retraining, occupation/activity based interventions, patient/caregiver education/training, transfer/mobility retraining, strengthening exercise  Therapy Frequency (OT Eval): 3 times/wk  Plan of Care Review  Plan of Care Reviewed With: patient  Plan of Care Reviewed With: patient  Outcome Summary: OT eval completed. Pt is a poor historian, unsure of PLOF as pt reports he does not/is not able to walk however does report that he leaves his wheelchair in his home, walks to car, and drives. Pt reports that he runs any errands he needs and cooks his own meals. Demos decreased safety awareness and impulsivity with transfer and taking a couple steps bed>chair, reluctant toward education for fall prevention. Performs LB ADL with set up and S. Pt refuses further evaluation at this time reporting \"I cannot walk, this is all I can do\". OT indicated to address ADL and functional mobility/transfer to increase independence and safety while decreasing fall risk. Pt would benefit from SNF at d/c for continued rehab as he lives alone and is at an increased risk for falls.    Outcome Measures     Row Name 02/07/20 1300             How much help from another is currently needed...    Putting on and taking off regular lower body clothing?  3  -MW      Bathing (including washing, rinsing, and drying)  3  -MW      Toileting (which includes using toilet bed pan or urinal)  3  -MW      Putting on and taking off regular " upper body clothing  4  -MW      Taking care of personal grooming (such as brushing teeth)  4  -MW      Eating meals  4  -MW      AM-PAC 6 Clicks Score (OT)  21  -MW         Functional Assessment    Outcome Measure Options  AM-PAC 6 Clicks Daily Activity (OT)  -MW        User Key  (r) = Recorded By, (t) = Taken By, (c) = Cosigned By    Initials Name Provider Type     Jacqueline Isabel, OTR/L Occupational Therapist          Time Calculation:   Time Calculation- OT     Row Name 02/07/20 1349             Time Calculation- OT    OT Start Time  [phone] spent in chart review and time gathering hx  -MW      OT Stop Time  1017  -MW      OT Time Calculation (min)  27 min  -MW      OT Received On  02/07/20  -MW      OT Goal Re-Cert Due Date  02/17/20  -MW        User Key  (r) = Recorded By, (t) = Taken By, (c) = Cosigned By    Initials Name Provider Type     Jacqueline Isabel, OTR/L Occupational Therapist        Therapy Charges for Today     Code Description Service Date Service Provider Modifiers Qty    [id number]  OT EVAL LOW COMPLEXITY 4 2/7/2020 Jacqueline Isabel, OTR/L GO 1               Jacqueline Isabel OTR/L  2/7/2020

## 2020-02-07 NOTE — PLAN OF CARE
Problem: Patient Care Overview  Goal: Plan of Care Review  Outcome: Ongoing (interventions implemented as appropriate)  Flowsheets (Taken 2/7/2020 9163)  Progress: no change  Plan of Care Reviewed With: patient  Outcome Summary: O2 sats stable on 1L/NC, no signs of respiratory distress. Pt repositions self in bed, no skin breakdown noted. VSS. Safety maintained. Continue to monitor.

## 2020-02-07 NOTE — H&P
"    [facility] Medicine Services  HISTORY AND PHYSICAL    Date of Admission: 2/6/2020  Primary Care Physician: Denys Yanez Jr., MD    Subjective     Chief Complaint: Right-sided pain    History of Present Illness  Ford High 54-year-old male with a past medical history of medical noncompliance, diabetes mellitus type 2, autonomic neuropathy, chronic diarrhea secondary to IBS, see below for complete list.  Patient presented to [facility] emergency department after 3 days illness of subjective fever, cough, right-sided pain scored 8 on a scale of 1-10.  He denies sputum production.  He states he is lost nearly 800 pounds over the past 2 months from IBS.  However, per record review patient only weighed 7 pounds more (162) 11 months ago at this facility.  ER work-up revealed glucose 265, leukocytosis with left shift, anemia, right lower lobe mass with concerns for neoplastic process, 2 cm lytic lesion with sclerotic border right hilum near the SI joint and multifocal pneumonia.  Patient was seen at [facility] 1/24/2020, unable to find ER note however patient states he was having diarrhea and vomiting and despite the fact he \"could not walk\" he was sent home.  It is documented patient does have poor medical compliance.  He reports taking NovoLog 20 units 3 times a day before meals and Levemir 50 units at bedtime.  He is admitted for further evaluation and treatment.    Review of Systems   A 10 point review of systems was completed, all negative except for those discussed in HPI    Past Medical History:   Past Medical History:   Diagnosis Date   • Autonomic neuropathy    • Chronic diarrhea    • Diabetes mellitus (CMS/HCC)    • Diabetic foot ulcer (CMS/HCC)    • Elevated cholesterol    • Lateral epicondylitis, right elbow    • Noncompliance    • Orthostatic hypotension    • Skin infection        Past Surgical History:   Past Surgical History:   Procedure " "Laterality Date   • TONSILLECTOMY         Family History: family history includes Arthritis in his father and mother; Diabetes in his father and mother; Heart disease in his father; Hyperlipidemia in his mother.    Social History:  reports that he has never smoked. He does not have any smokeless tobacco history on file. He reports that he does not drink alcohol or use drugs.    Code Status: Full, if unable speak for himself his wife will speak for him    Allergies:  No Known Allergies    Medications:  NovoLog regular insulin 20 units 3 times a day before meals  Levemir pen 50 units at bedtime    Objective     /66 (BP Location: Right arm, Patient Position: Lying)   Pulse 79   Temp 98.4 °F (36.9 °C) (Oral)   Resp 16   Ht 190.5 cm (75\")   Wt 70.3 kg (155 lb)   SpO2 100%   BMI 19.37 kg/m²   Physical Exam   Constitutional: He is oriented to person, place, and time. He appears well-developed.   Cachectic, looks older than stated age   HENT:   Head: Normocephalic and atraumatic.   Poor dentition   Eyes: Pupils are equal, round, and reactive to light. EOM are normal.   Neck: Normal range of motion. No JVD present. No tracheal deviation present.   Cardiovascular: Normal rate, regular rhythm and normal heart sounds.   No murmur heard.  Pulmonary/Chest: Effort normal. No respiratory distress. He has no wheezes (anterior assessment). He has no rales ( anterior assessment).   Severe pain noted upon deep breath   Abdominal: Soft. Bowel sounds are normal. He exhibits no distension. There is no tenderness.   Musculoskeletal: Normal range of motion. He exhibits no edema.   Neurological: He is alert and oriented to person, place, and time.   Skin: Skin is warm and dry. There is pallor.   Psychiatric: He has a normal mood and affect. His behavior is normal.       Pertinent Data:   Lab Results (last 72 hours)     Procedure Component Value Units Date/Time    Blood Culture - Blood, Wrist, Left [[id number]] Collected:  " 02/06/20 1348    Specimen:  Blood from Wrist, Left Updated:  02/06/20 1547    Lactic Acid, Plasma [[id number]]  (Normal) Collected:  02/06/20 1405    Specimen:  Blood Updated:  02/06/20 1525     Lactate 0.7 mmol/L     Influenza Antigen, Rapid - Swab, Nasopharynx [[id number]]  (Normal) Collected:  02/06/20 1453    Specimen:  Swab from Nasopharynx Updated:  02/06/20 1520     Influenza A Ag, EIA Negative     Influenza B Ag, EIA Negative    Narrative:       Recommend confirmation of negative results by viral culture or molecular assay.    Troponin [[id number]]  (Normal) Collected:  02/06/20 1348    Specimen:  Blood Updated:  02/06/20 1520     Troponin T <0.010 ng/mL     Comprehensive Metabolic Panel [[id number]]  (Abnormal) Collected:  02/06/20 1348    Specimen:  Blood Updated:  02/06/20 1438     Glucose 265 mg/dL      BUN 11 mg/dL      Creatinine 0.58 mg/dL      Sodium 135 mmol/L      Potassium 4.2 mmol/L      Chloride 97 mmol/L      CO2 30.0 mmol/L      Calcium 8.6 mg/dL      Total Protein 6.9 g/dL      Albumin 3.20 g/dL      ALT (SGPT) 11 U/L      AST (SGOT) 12 U/L      Alkaline Phosphatase 104 U/L      Total Bilirubin 0.3 mg/dL      eGFR Non African Amer 146 mL/min/1.73      Globulin 3.7 gm/dL      A/G Ratio 0.9 g/dL      BUN/Creatinine Ratio 19.0     Anion Gap 8.0 mmol/L     Lipase [[id number]]  (Abnormal) Collected:  02/06/20 1348    Specimen:  Blood Updated:  02/06/20 1438     Lipase 9 U/L     Amylase [[id number]]  (Abnormal) Collected:  02/06/20 1348    Specimen:  Blood Updated:  02/06/20 1438     Amylase 12 U/L     Protime-INR [[id number]]  (Normal) Collected:  02/06/20 1348    Specimen:  Blood Updated:  02/06/20 1434     Protime 13.8 Seconds      INR 1.03    aPTT [[id number]]  (Abnormal) Collected:  02/06/20 1348    Specimen:  Blood Updated:  02/06/20 1434     PTT 39.4 seconds     CBC Auto Differential [[id number]]  (Abnormal) Collected:  02/06/20 1348    Specimen:  Blood Updated:  02/06/20 1428     WBC 12.48 10*3/mm3       RBC 3.19 10*6/mm3      Hemoglobin 9.3 g/dL      Hematocrit 27.9 %      MCV 87.5 fL      MCH 29.2 pg      MCHC 33.3 g/dL      RDW 12.2 %      RDW-SD 39.6 fl      MPV 8.8 fL      Platelets 388 10*3/mm3      Neutrophil % 82.4 %      Lymphocyte % 7.9 %      Monocyte % 8.2 %      Eosinophil % 0.6 %      Basophil % 0.2 %      Immature Grans % 0.7 %      Neutrophils, Absolute 10.28 10*3/mm3      Lymphocytes, Absolute 0.98 10*3/mm3      Monocytes, Absolute 1.02 10*3/mm3      Eosinophils, Absolute 0.08 10*3/mm3      Basophils, Absolute 0.03 10*3/mm3      Immature Grans, Absolute 0.09 10*3/mm3      nRBC 0.0 /100 WBC      Imaging Results (Last 24 Hours)     Procedure Component Value Units Date/Time    XR Chest 1 View [[id number]] Collected:  02/06/20 1610     Updated:  02/06/20 1616    Narrative:       XR CHEST 1 VW-     Indication: fever. cough     Comparison: Same day CT     Findings:     Cardiac silhouette is upper limits of normal in size.   No pleural effusion or visible pneumothorax.   Focal airspace opacity in the right lower lobe and left upper lobe.   No suspicious osseous lesion.       Impression:          Multifocal bilateral airspace opacity, suspicious for multifocal  pneumonia. Recommend follow-up imaging to document resolution and  exclude neoplastic process.  This report was finalized on 02/06/2020 16:13 by Dr. Jared Cerrato MD.    CT Abdomen Pelvis Without Contrast [[id number]] Collected:  02/06/20 1555     Updated:  02/06/20 1607    Narrative:       EXAMINATION: CT ABDOMEN PELVIS WO CONTRAST-  2/6/2020 3:55 PM CST     HISTORY: Abdominal pain     COMPARISON:None     TECHNIQUE:  Radiation dose equals  mGy-cm.  Automated exposure  control dose reduction technique was implemented.     Thin section axial imaging was obtained. 2-D sagittal and coronal  reconstruction images were generated.     Intravenous contrast was not administered.     Oral contrast was not ingested.     FINDINGS:     There is imaged  part degradation particularly in the lower  abdomen/pelvis due to patient motion.     There is a pleural-based, masslike-infiltrative airspace consolidation  in the right lower lobe posterior medially with focal area of cystic  change centrally within the consolidation. Acute infectious/inflammatory  process considered. Malignancy could also have this appearance.  Correlate with patient presentation.     There is no CT evidence of gallstones.     There is no focal hepatic lesions. There is no biliary ductal  dilatation.     The spleen is nonenlarged.     There are no adrenal masses.     There are no pancreatic lesions.     There are no urinary tract calculi.     There is no pelvocaliectasis or hydroureter. The urinary bladder is  mildly distended. There is no focal bladder wall abnormality. Prostate  gland is not enlarged.     There is moderate amount of stool identified throughout the colon which  is not dilated. The appendix is not inflamed.     The small bowel is not dilated.     The duodenal sweep is imaged appropriately.     The stomach is not distended.     There are no pathologically enlarged lymph nodes.     There is atherosclerotic aorto iliac and femoral calcifications.     There is no ascites or pneumoperitoneum.     No acute osseous abnormalities observed.     There is a 2 cm lytic lesion with sclerotic border identified in the  right ilium near the SI joint, nonaggressive appearing.       Impression:       1. Infiltrative masslike area of airspace consolidation in the right  lower lobe, pleural-based with focal cystic change centrally.  Differential considerations include infectious/inflammatory change,  pneumonia, as well as pulmonary neoplasm/malignancy. Correlate with  patient presentation, follow-up recommended.  2. Atherosclerotic calcifications.  3. No CT evidence of acute intra-abdominal/pelvic pathological process.  This report was finalized on 02/06/2020 16:04 by Dr. Cortes Ruff MD.                    I have personally reviewed and interpreted the radiology studies and ECG obtained at time of admission.     Assessment / Plan     Assessment:   Active Hospital Problems    Diagnosis   • Pneumonia of right lower lobe due to infectious organism (CMS/HCC)   • Type 2 diabetes mellitus with hyperglycemia, with long-term current use of insulin (CMS/HCC)   • Leukocytosis   • Normocytic anemia   • Right-sided chest wall pain   Concerns for lung cancer with metastatic disease due to sclerotic lesion border right ilium near SI joint    Plan:   1.  Admit as inpatient  2.  Consult pulmonology  3.  Home medication reviewed  4.  DVT prophylaxis with SCDs  5.  Continue Rocephin and azithromycin  6.  Incentive spirometry, supplemental O2, continuous pulse oximetry, nebs, ABGs as needed  7.  RT to initiate breathing treatment protocol  8.  Additional labs anemia studies, urine for Legionella and strep pneumoniae, respiratory culture, urinalysis with culture  9.  Labs in a.m.  10.  Normal saline 100 mL/hour  11.  Monitor glucose before meals and at bedtime with regular insulin sliding scale coverage  12.  Pain management  13.  N.p.o. after midnight for possible biopsy in a.m.    I discussed the patient's findings and my recommendations with: Tay London MD  Time spent: 40 minutes    Patient seen and examined by me on 2/6/2020 at 6:50 PM.    DENISHA Jerome  02/06/20   8:13 PM     I personally evaluated and examined the patient in conjunction with  Eliseo DENNY and agree with the assessment, treatment plan, and disposition of the patient as recorded by her. My history, exam, and further recommendations are:     Vitals:    02/06/20 1950   BP: 104/66   Pulse: 79   Resp: 16   Temp: 98.4 °F (36.9 °C)   SpO2: 100%   He's a 54-year-old man who came to the emergency room for evaluation of abdominal  pain. He's admitted with a diagnosis of right lower lobe pneumonia. Review of records indicates that he has been complaining  of right sided pain that radiates his back/side after his abdomen worse with movement and coughing. His vital signs are stable but slightly tachycardic.  CXR on my review showed fullness of right hilum. However, CT of  abdomen and pelvis in evaluation for abdominal  pain apparently showed infiltrative like pleural base  consolidation in the right lower lobe. He received .ceftriaxone, azithromycin, dilaudid,  Zofran and Advil +1 L of IV fluid    On encounter, he said 'I'm hungry  He seems chronically ill.  He has point tenderness on right flank, up to the side of his ribs. His BMi is 19.      Will treat for pna. Consult Pulmonary. May need IR for biopsy.   Agree with above  Tay London MD  02/06/20  10:27 PM

## 2020-02-07 NOTE — PLAN OF CARE
"  Problem: Patient Care Overview  Goal: Plan of Care Review  Outcome: Ongoing (interventions implemented as appropriate)  Flowsheets (Taken 2/7/2020 8310)  Progress: no change  Plan of Care Reviewed With: patient  Outcome Summary: OT dinora completed. Pt is a poor historian, unsure of PLOF as pt reports he does not/is not able to walk however does report that he leaves his wheelchair in his home, walks to car, and drives. Pt reports that he runs any errands he needs and cooks his own meals. Demos decreased safety awareness and impulsivity with transfer and taking a couple steps bed>chair, reluctant toward education for fall prevention. Performs LB ADL with set up and S. Pt refuses further evaluation at this time reporting \"I cannot walk, this is all I can do\". OT indicated to address ADL and functional mobility/transfer to increase independence and safety while decreasing fall risk. Pt would benefit from SNF at d/c for continued rehab as he lives alone and is at an increased risk for falls.     "

## 2020-02-07 NOTE — PROGRESS NOTES
"    [facility] Medicine Services  INPATIENT PROGRESS NOTE    Patient Name: Ford High  Date of Admission: 2/6/2020  Today's Date: 02/07/20  Length of Stay: 1  Primary Care Physician: Denys Yanez Jr., MD    Subjective   Chief Complaint: f/u   HPI   I do not believe that the patient is the best historian.  He actually contradicts his story from the other.  He said that he has diarrhea/bowel movement few times a day but in the recent past had been constipated.  He is wearing diaper currently because he said he has diarrhea but on the other hand he just mentioned to me that he has constipation.    He states that couple of hours after he ate he would have bowel movement.  This has been his norm but again he stated that he has constipation currently.    Despite good appetite he claimed to have lost 100 pounds over 1 year.    He has right flank pain and right sided chest pain.  He said he was just at [facility].  He didn't say much of details on this. He said he \"couldn't walk and was asked to dress up and go\".     He works at [employer].        Review of Systems     All pertinent negatives and positives are as above. All other systems have been reviewed and are negative unless otherwise stated.     Objective    Temp:  [98.4 °F (36.9 °C)-102.1 °F (38.9 °C)] 98.4 °F (36.9 °C)  Heart Rate:  [] 93  Resp:  [16-20] 16  BP: (101-135)/() 101/60  Physical Exam  Constitutional: He is oriented to person, place, and time. He appears well-developed.   Cachectic, looks older than stated age   HENT:   Head: Normocephalic and atraumatic.   Poor dentition   Eyes: Pupils are equal, round, and reactive to light. EOM are normal.   Neck: Normal range of motion. No JVD present. No tracheal deviation present.   Cardiovascular: Normal rate, regular rhythm and normal heart sounds.   No murmur heard.  Pulmonary/Chest: Effort normal. No respiratory distress.lungs sounds are " clear  Tender to touch on right flank, in fact he asked me to take it easy.  I didn't see any superficial mass.   If I remember it right, I don't believe there was many of any bony erosion in this area from imaging study  Abdominal: Soft. Bowel sounds are normal. He exhibits no distension. There is no tenderness.   Musculoskeletal: Normal range of motion. He exhibits no edema.   Neurological: He is alert and oriented to person, place, and time.   Skin: Skin is warm and dry. There is pallor.   Psychiatric: He has a normal mood and affect. His behavior is normal.          Results Review:  I have reviewed the labs, radiology results, and diagnostic studies.    Laboratory Data:   Results from last 7 days   Lab Units 02/07/20  0447 02/06/20  1348   WBC 10*3/mm3 16.07* 12.48*   HEMOGLOBIN g/dL 9.1* 9.3*   HEMATOCRIT % 27.6* 27.9*   PLATELETS 10*3/mm3 368 388        Results from last 7 days   Lab Units 02/07/20  0447 02/06/20  1348   SODIUM mmol/L 141 135*   POTASSIUM mmol/L 4.3 4.2   CHLORIDE mmol/L 102 97*   CO2 mmol/L 29.0 30.0*   BUN mg/dL 10 11   CREATININE mg/dL 0.52* 0.58*   CALCIUM mg/dL 8.5* 8.6   BILIRUBIN mg/dL  --  0.3   ALK PHOS U/L  --  104   ALT (SGPT) U/L  --  11   AST (SGOT) U/L  --  12   GLUCOSE mg/dL 285* 265*       Culture Data:   Blood Culture   Date Value Ref Range Status   02/06/2020 No growth at less than 24 hours  Preliminary   02/06/2020 No growth at less than 24 hours  Preliminary       Radiology Data:   Imaging Results (Last 24 Hours)     Procedure Component Value Units Date/Time    XR Chest 1 View [[id number]] Collected:  02/06/20 1610     Updated:  02/06/20 1616    Narrative:       XR CHEST 1 VW-     Indication: fever. cough     Comparison: Same day CT     Findings:     Cardiac silhouette is upper limits of normal in size.   No pleural effusion or visible pneumothorax.   Focal airspace opacity in the right lower lobe and left upper lobe.   No suspicious osseous lesion.       Impression:           Multifocal bilateral airspace opacity, suspicious for multifocal  pneumonia. Recommend follow-up imaging to document resolution and  exclude neoplastic process.  This report was finalized on 02/06/2020 16:13 by Dr. Jared Cerrato MD.    CT Abdomen Pelvis Without Contrast [[id number]] Collected:  02/06/20 1555     Updated:  02/06/20 1607    Narrative:       EXAMINATION: CT ABDOMEN PELVIS WO CONTRAST-  2/6/2020 3:55 PM CST     HISTORY: Abdominal pain     COMPARISON:None     TECHNIQUE:  Radiation dose equals  mGy-cm.  Automated exposure  control dose reduction technique was implemented.     Thin section axial imaging was obtained. 2-D sagittal and coronal  reconstruction images were generated.     Intravenous contrast was not administered.     Oral contrast was not ingested.     FINDINGS:     There is imaged part degradation particularly in the lower  abdomen/pelvis due to patient motion.     There is a pleural-based, masslike-infiltrative airspace consolidation  in the right lower lobe posterior medially with focal area of cystic  change centrally within the consolidation. Acute infectious/inflammatory  process considered. Malignancy could also have this appearance.  Correlate with patient presentation.     There is no CT evidence of gallstones.     There is no focal hepatic lesions. There is no biliary ductal  dilatation.     The spleen is nonenlarged.     There are no adrenal masses.     There are no pancreatic lesions.     There are no urinary tract calculi.     There is no pelvocaliectasis or hydroureter. The urinary bladder is  mildly distended. There is no focal bladder wall abnormality. Prostate  gland is not enlarged.     There is moderate amount of stool identified throughout the colon which  is not dilated. The appendix is not inflamed.     The small bowel is not dilated.     The duodenal sweep is imaged appropriately.     The stomach is not distended.     There are no pathologically enlarged lymph  nodes.     There is atherosclerotic aorto iliac and femoral calcifications.     There is no ascites or pneumoperitoneum.     No acute osseous abnormalities observed.     There is a 2 cm lytic lesion with sclerotic border identified in the  right ilium near the SI joint, nonaggressive appearing.       Impression:       1. Infiltrative masslike area of airspace consolidation in the right  lower lobe, pleural-based with focal cystic change centrally.  Differential considerations include infectious/inflammatory change,  pneumonia, as well as pulmonary neoplasm/malignancy. Correlate with  patient presentation, follow-up recommended.  2. Atherosclerotic calcifications.  3. No CT evidence of acute intra-abdominal/pelvic pathological process.  This report was finalized on 02/06/2020 16:04 by Dr. Cortes Ruff MD.          I have reviewed the patient's current medications.     Assessment/Plan     Active Hospital Problems    Diagnosis   • Pneumonia of right lower lobe due to infectious organism (CMS/HCC)   • Type 2 diabetes mellitus with hyperglycemia, with long-term current use of insulin (CMS/HCC)   • Leukocytosis   • Normocytic anemia   • Right-sided chest wall pain   constipation  Hx of ?IBS  Weight loss; suspected malignancy of lung on top of poorly controlled DM  Hyperglycemia/Glucosuria/poorly controlled DM - Aic 15%  Iron def anemia - ? Malnutrition vs chronic disease of both   Elevated ferritin - ? Acute phase reactant        Pulmonary consult - discussed with Catalina; requesting recommendation on eval and managementWill need biopsy - ? IR   Continue treatment for PNA; follow cultures; negative Legionella and strep pneumoniae antigen  LDL 64  Iron supplement   Nutritionist and diabetic eductor  Will place on levemir and cont ssi  Discussed with nurse Aaron is currently npo pending possibility of biopsy if felt appropriate from Pulmonary or alternative given on work up     azithromycin 500 mg Intravenous Q24H      cefTRIAXone 1 g Intravenous Q24H   insulin lispro 0-14 Units Subcutaneous 4x Daily With Meals & Nightly   ipratropium-albuterol 3 mL Nebulization 4x Daily - RT   sodium chloride 10 mL Intravenous Q12H         Discharge Planning: to be determined  Tay London MD   02/07/20   9:00 AM  june

## 2020-02-07 NOTE — CONSULTS
"      PULMONARY & CRITICAL CARE CONSULT - [facility]    20, 8:00 AM  Patient Care Team:  Denys Yanez Jr., MD as PCP - General (Family Medicine)  Name: Ford High  : 1965  MRN: [id number]  Contact Serial Number [id number]    Chief complaint: Right-sided flank pain    HPI:  We have been consulted by Tay London* to see this 54 y.o. male born on 1965.  Patient complains of right-sided flank pain x3 days.  He says he was recently seen at [facility] for nausea, vomiting and diarrhea.  He is currently not having any of those problems on this hospitalization.  He also has a history of orthostatic hypotension.  He is a non-smoker and has no known pulmonary history.  We have been consulted concerns of a possible \"lung mass\".  The patient had a CT of the abdomen on admission that shows a masslike infiltrate in the right lower lobe.  At this point, we would be more concerned for a pneumonia and we will hold off with any plans for doing any type of biopsy.  The patient is on room air and denies cough, hemoptysis or shortness of breath.    Past Medical History:   has a past medical history of Autonomic neuropathy, Chronic diarrhea, Diabetes mellitus (CMS/HCC), Diabetic foot ulcer (CMS/HCC), Elevated cholesterol, Lateral epicondylitis, right elbow, Noncompliance, Orthostatic hypotension, and Skin infection.   has a past surgical history that includes Tonsillectomy.  No Known Allergies  Medications:    azithromycin 500 mg Intravenous Q24H   cefTRIAXone 1 g Intravenous Q24H   insulin lispro 0-14 Units Subcutaneous 4x Daily With Meals & Nightly   ipratropium-albuterol 3 mL Nebulization 4x Daily - RT   sodium chloride 10 mL Intravenous Q12H       sodium chloride 100 mL/hr Last Rate: 100 mL/hr (20 7758)     Family History:  Family History   Problem Relation Age of Onset   • Diabetes Mother    • Arthritis Mother    • Hyperlipidemia Mother    • Heart disease Father   " "  • Diabetes Father    • Arthritis Father      Social History:   reports that he has never smoked. He does not have any smokeless tobacco history on file. He reports that he does not drink alcohol or use drugs.    Review of Systems:  Review of Systems   Constitutional: Positive for unexpected weight change (Recent unintentional weight loss).   HENT: Negative.    Respiratory: Negative for apnea, cough, chest tightness, shortness of breath and wheezing.    Cardiovascular: Negative.  Negative for leg swelling.   Gastrointestinal: Negative.         Frequent GI issues with nausea, vomiting and diarrhea.  Currently the patient is not having those symptoms but he does wear an adult diaper \"just in case\".   Genitourinary: Positive for flank pain (Right side).   Skin: Negative.    Neurological: Positive for light-headedness (Secondary to a history of orthostatic hypotension).   Hematological: Negative for adenopathy. Does not bruise/bleed easily.   Psychiatric/Behavioral: Negative.         Physical Exam:  Temp:  [98.4 °F (36.9 °C)-102.1 °F (38.9 °C)] 98.4 °F (36.9 °C)  Heart Rate:  [] 93  Resp:  [16-20] 16  BP: (101-135)/() 101/60    Intake/Output Summary (Last 24 hours) at 2/7/2020 0800  Last data filed at 2/7/2020 0300  Gross per 24 hour   Intake 2000 ml   Output 1300 ml   Net 700 ml         02/06/20  1337 02/06/20  1814   Weight: 70.3 kg (155 lb) 70.3 kg (155 lb)     SpO2 Percentage    02/07/20 0616 02/07/20 0646 02/07/20 0651   SpO2: 99% 98% 100%     Physical Exam   Constitutional: He is oriented to person, place, and time. He appears well-developed and well-nourished.   Appears older than his stated age   HENT:   Head: Normocephalic and atraumatic.   Eyes: Pupils are equal, round, and reactive to light.   Neck: Normal range of motion. Neck supple.   Cardiovascular: Normal rate and regular rhythm.   Pulmonary/Chest: Effort normal and breath sounds normal.   Abdominal: Soft. Bowel sounds are normal. He " exhibits no distension.   Musculoskeletal: Normal range of motion.   Neurological: He is alert and oriented to person, place, and time.   Skin: Skin is warm and dry. There is pallor.   Psychiatric: He has a normal mood and affect.   Nursing note and vitals reviewed.       Results from last 7 days   Lab Units 02/07/20  0447 02/06/20  1348   WBC 10*3/mm3 16.07* 12.48*   HEMOGLOBIN g/dL 9.1* 9.3*   PLATELETS 10*3/mm3 368 388     Results from last 7 days   Lab Units 02/07/20  0447 02/06/20  1348   SODIUM mmol/L 141 135*   POTASSIUM mmol/L 4.3 4.2   CO2 mmol/L 29.0 30.0*   BUN mg/dL 10 11   CREATININE mg/dL 0.52* 0.58*   GLUCOSE mg/dL 285* 265*         No results found for: PROBNP  Blood Culture   Date Value Ref Range Status   02/06/2020 No growth at less than 24 hours  Preliminary   02/06/2020 No growth at less than 24 hours  Preliminary     Recent radiology:   Imaging Results (Last 72 Hours)     Procedure Component Value Units Date/Time    XR Chest 1 View [[id number]] Collected:  02/06/20 1610     Updated:  02/06/20 1616    Narrative:       XR CHEST 1 VW-     Indication: fever. cough     Comparison: Same day CT     Findings:     Cardiac silhouette is upper limits of normal in size.   No pleural effusion or visible pneumothorax.   Focal airspace opacity in the right lower lobe and left upper lobe.   No suspicious osseous lesion.       Impression:          Multifocal bilateral airspace opacity, suspicious for multifocal  pneumonia. Recommend follow-up imaging to document resolution and  exclude neoplastic process.  This report was finalized on 02/06/2020 16:13 by Dr. Jared Cerrato MD.    CT Abdomen Pelvis Without Contrast [[id number]] Collected:  02/06/20 1555     Updated:  02/06/20 1607    Narrative:       EXAMINATION: CT ABDOMEN PELVIS WO CONTRAST-  2/6/2020 3:55 PM CST     HISTORY: Abdominal pain     COMPARISON:None     TECHNIQUE:  Radiation dose equals  mGy-cm.  Automated exposure  control dose reduction  technique was implemented.     Thin section axial imaging was obtained. 2-D sagittal and coronal  reconstruction images were generated.     Intravenous contrast was not administered.     Oral contrast was not ingested.     FINDINGS:     There is imaged part degradation particularly in the lower  abdomen/pelvis due to patient motion.     There is a pleural-based, masslike-infiltrative airspace consolidation  in the right lower lobe posterior medially with focal area of cystic  change centrally within the consolidation. Acute infectious/inflammatory  process considered. Malignancy could also have this appearance.  Correlate with patient presentation.     There is no CT evidence of gallstones.     There is no focal hepatic lesions. There is no biliary ductal  dilatation.     The spleen is nonenlarged.     There are no adrenal masses.     There are no pancreatic lesions.     There are no urinary tract calculi.     There is no pelvocaliectasis or hydroureter. The urinary bladder is  mildly distended. There is no focal bladder wall abnormality. Prostate  gland is not enlarged.     There is moderate amount of stool identified throughout the colon which  is not dilated. The appendix is not inflamed.     The small bowel is not dilated.     The duodenal sweep is imaged appropriately.     The stomach is not distended.     There are no pathologically enlarged lymph nodes.     There is atherosclerotic aorto iliac and femoral calcifications.     There is no ascites or pneumoperitoneum.     No acute osseous abnormalities observed.     There is a 2 cm lytic lesion with sclerotic border identified in the  right ilium near the SI joint, nonaggressive appearing.       Impression:       1. Infiltrative masslike area of airspace consolidation in the right  lower lobe, pleural-based with focal cystic change centrally.  Differential considerations include infectious/inflammatory change,  pneumonia, as well as pulmonary  neoplasm/malignancy. Correlate with  patient presentation, follow-up recommended.  2. Atherosclerotic calcifications.  3. No CT evidence of acute intra-abdominal/pelvic pathological process.  This report was finalized on 2020 16:04 by Dr. Cortes Ruff MD.        My radiograph interpretation/independent review of imaging: Right lower lobe and left upper lobe opacities  Other test results (not lab or imaging):   None  Independent review of ekg: Normal sinus rhythm, rate 99, QTc 451    Problem List as identified by Epic (may contain historical, inactive problems)  Patient Active Problem List   Diagnosis   • Pneumonia of right lower lobe due to infectious organism (CMS/HCC)   • Type 2 diabetes mellitus with hyperglycemia, with long-term current use of insulin (CMS/[facility])   • Leukocytosis   • Normocytic anemia   • Right-sided chest wall pain     Pulmonary Assessment:  New problem (to me), with additional workup planned: Bilateral opacities concerning for pneumonia    New problem (to me), no additional workup planned: Recent weight loss with a reported history of frequent diarrhea    Other problems either stable, failing to improve or worsenin. Leukocytosis  2. Anemia    Recommend/plan:   · We would not recommend a biopsy at this time.  Could consider a CT of the chest?  · Obtain Legionella and strep pneumo antigens.  · Obtain sputum culture if able to produce.  · Continue azithromycin and Rocephin.  · Bronchodilator treatments as needed  · Work with therapies    Thank you for this consult.  We will follow along.  Electronically signed by DENISHA Butler, 20, 8:00 AM.  Physician substantive contribution:  Pertinent symptoms/interval history include: The patient currently is resting comfortably.  Respiratory exam shows pertinent findings of clear lung fields.  Plan includes: I agree with antibiotic therapy and we can get follow-up imaging studies possibly to include a chest CT after he has  been on several days of antibiotics.  I have seen and examined patient personally, performing a face-to-face diagnostic evaluation with plan of care reviewed and developed with APRN and nursing staff. I have addended and/or modified the above history of present illness, physical examination, and assessment and plan to reflect my findings and impressions. Essential elements of the care plan were discussed with APRN above.  Agree with findings and assessment/plan as documented above.    Electronically signed by Navin Mckenna MD, on 2/7/2020, 2:50 PM

## 2020-02-07 NOTE — DISCHARGE PLACEMENT REQUEST
"Bree Naylor N (54 y.o. Male)     Date of Birth Social Security Number Address Home Phone MRN    1965  [id number]   [address]  [facility] [phone] [id number]    Mandaen Marital Status          Confucianist        Admission Date Admission Type Admitting Provider Attending Provider Department, Room/Bed    2/6/20 Emergency Tay London MD [place], Glenn Riego, MD [address], 493/1    Discharge Date Discharge Disposition Discharge Destination                       Attending Provider:  Tay London MD    Allergies:  No Known Allergies    Isolation:  None   Infection:  None   Code Status:  CPR    Ht:  190.5 cm (75\")   Wt:  70.3 kg (155 lb)    Admission Cmt:  None   Principal Problem:  None                Active Insurance as of 2/6/2020     Primary Coverage     Payor Plan Insurance Group Employer/Plan Group    MEDICARE MEDICARE A & B      Payor Plan Address Payor Plan Phone Number Payor Plan Fax Number Effective Dates    [address] [phone]  3/1/2019 - None Entered    [place] [id number]       Subscriber Name Subscriber Birth Date Member ID       BREE NAYLOR 1965 [id number]                 Emergency Contacts      (Rel.) Home Phone Work Phone Mobile Phone    Lily Naylor (Spouse) [phone] -- --               History & Physical      Tay London MD at 02/06/20 1840              [facility] Medicine Services  HISTORY AND PHYSICAL    Date of Admission: 2/6/2020  Primary Care Physician: Denys Yanez Jr., MD    Subjective     Chief Complaint: Right-sided pain    History of Present Illness  Bree Naylor 54-year-old male with a past medical history of medical noncompliance, diabetes mellitus type 2, autonomic neuropathy, chronic diarrhea secondary to IBS, see below for complete list.  Patient presented to [facility] emergency department after " "3 days illness of subjective fever, cough, right-sided pain scored 8 on a scale of 1-10.  He denies sputum production.  He states he is lost nearly 800 pounds over the past 2 months from IBS.  However, per record review patient only weighed 7 pounds more (162) 11 months ago at this facility.  ER work-up revealed glucose 265, leukocytosis with left shift, anemia, right lower lobe mass with concerns for neoplastic process, 2 cm lytic lesion with sclerotic border right hilum near the SI joint and multifocal pneumonia.  Patient was seen at [facility] 1/24/2020, unable to find ER note however patient states he was having diarrhea and vomiting and despite the fact he \"could not walk\" he was sent home.  It is documented patient does have poor medical compliance.  He reports taking NovoLog 20 units 3 times a day before meals and Levemir 50 units at bedtime.  He is admitted for further evaluation and treatment.    Review of Systems   A 10 point review of systems was completed, all negative except for those discussed in HPI    Past Medical History:   Past Medical History:   Diagnosis Date   • Autonomic neuropathy    • Chronic diarrhea    • Diabetes mellitus (CMS/HCC)    • Diabetic foot ulcer (CMS/HCC)    • Elevated cholesterol    • Lateral epicondylitis, right elbow    • Noncompliance    • Orthostatic hypotension    • Skin infection        Past Surgical History:   Past Surgical History:   Procedure Laterality Date   • TONSILLECTOMY         Family History: family history includes Arthritis in his father and mother; Diabetes in his father and mother; Heart disease in his father; Hyperlipidemia in his mother.    Social History:  reports that he has never smoked. He does not have any smokeless tobacco history on file. He reports that he does not drink alcohol or use drugs.    Code Status: Full, if unable speak for himself his wife will speak for him    Allergies:  No Known Allergies    Medications:  NovoLog regular insulin " "20 units 3 times a day before meals  Levemir pen 50 units at bedtime    Objective     /66 (BP Location: Right arm, Patient Position: Lying)   Pulse 79   Temp 98.4 °F (36.9 °C) (Oral)   Resp 16   Ht 190.5 cm (75\")   Wt 70.3 kg (155 lb)   SpO2 100%   BMI 19.37 kg/m²    Physical Exam   Constitutional: He is oriented to person, place, and time. He appears well-developed.   Cachectic, looks older than stated age   HENT:   Head: Normocephalic and atraumatic.   Poor dentition   Eyes: Pupils are equal, round, and reactive to light. EOM are normal.   Neck: Normal range of motion. No JVD present. No tracheal deviation present.   Cardiovascular: Normal rate, regular rhythm and normal heart sounds.   No murmur heard.  Pulmonary/Chest: Effort normal. No respiratory distress. He has no wheezes (anterior assessment). He has no rales ( anterior assessment).   Severe pain noted upon deep breath   Abdominal: Soft. Bowel sounds are normal. He exhibits no distension. There is no tenderness.   Musculoskeletal: Normal range of motion. He exhibits no edema.   Neurological: He is alert and oriented to person, place, and time.   Skin: Skin is warm and dry. There is pallor.   Psychiatric: He has a normal mood and affect. His behavior is normal.       Pertinent Data:   Lab Results (last 72 hours)     Procedure Component Value Units Date/Time    Blood Culture - Blood, Wrist, Left [[id number]] Collected:  02/06/20 1348    Specimen:  Blood from Wrist, Left Updated:  02/06/20 1547    Lactic Acid, Plasma [[id number]]  (Normal) Collected:  02/06/20 1405    Specimen:  Blood Updated:  02/06/20 1525     Lactate 0.7 mmol/L     Influenza Antigen, Rapid - Swab, Nasopharynx [[id number]]  (Normal) Collected:  02/06/20 1453    Specimen:  Swab from Nasopharynx Updated:  02/06/20 1520     Influenza A Ag, EIA Negative     Influenza B Ag, EIA Negative    Narrative:       Recommend confirmation of negative results by viral culture or molecular assay.   "    Troponin [[id number]]  (Normal) Collected:  02/06/20 1348    Specimen:  Blood Updated:  02/06/20 1520     Troponin T <0.010 ng/mL     Comprehensive Metabolic Panel [[id number]]  (Abnormal) Collected:  02/06/20 1348    Specimen:  Blood Updated:  02/06/20 1438     Glucose 265 mg/dL      BUN 11 mg/dL      Creatinine 0.58 mg/dL      Sodium 135 mmol/L      Potassium 4.2 mmol/L      Chloride 97 mmol/L      CO2 30.0 mmol/L      Calcium 8.6 mg/dL      Total Protein 6.9 g/dL      Albumin 3.20 g/dL      ALT (SGPT) 11 U/L      AST (SGOT) 12 U/L      Alkaline Phosphatase 104 U/L      Total Bilirubin 0.3 mg/dL      eGFR Non African Amer 146 mL/min/1.73      Globulin 3.7 gm/dL      A/G Ratio 0.9 g/dL      BUN/Creatinine Ratio 19.0     Anion Gap 8.0 mmol/L     Lipase [[id number]]  (Abnormal) Collected:  02/06/20 1348    Specimen:  Blood Updated:  02/06/20 1438     Lipase 9 U/L     Amylase [[id number]]  (Abnormal) Collected:  02/06/20 1348    Specimen:  Blood Updated:  02/06/20 1438     Amylase 12 U/L     Protime-INR [[id number]]  (Normal) Collected:  02/06/20 1348    Specimen:  Blood Updated:  02/06/20 1434     Protime 13.8 Seconds      INR 1.03    aPTT [[id number]]  (Abnormal) Collected:  02/06/20 1348    Specimen:  Blood Updated:  02/06/20 1434     PTT 39.4 seconds     CBC Auto Differential [[id number]]  (Abnormal) Collected:  02/06/20 1348    Specimen:  Blood Updated:  02/06/20 1428     WBC 12.48 10*3/mm3      RBC 3.19 10*6/mm3      Hemoglobin 9.3 g/dL      Hematocrit 27.9 %      MCV 87.5 fL      MCH 29.2 pg      MCHC 33.3 g/dL      RDW 12.2 %      RDW-SD 39.6 fl      MPV 8.8 fL      Platelets 388 10*3/mm3      Neutrophil % 82.4 %      Lymphocyte % 7.9 %      Monocyte % 8.2 %      Eosinophil % 0.6 %      Basophil % 0.2 %      Immature Grans % 0.7 %      Neutrophils, Absolute 10.28 10*3/mm3      Lymphocytes, Absolute 0.98 10*3/mm3      Monocytes, Absolute 1.02 10*3/mm3      Eosinophils, Absolute 0.08 10*3/mm3      Basophils,  Absolute 0.03 10*3/mm3      Immature Grans, Absolute 0.09 10*3/mm3      nRBC 0.0 /100 WBC      Imaging Results (Last 24 Hours)     Procedure Component Value Units Date/Time    XR Chest 1 View [[id number]] Collected:  02/06/20 1610     Updated:  02/06/20 1616    Narrative:       XR CHEST 1 VW-     Indication: fever. cough     Comparison: Same day CT     Findings:     Cardiac silhouette is upper limits of normal in size.   No pleural effusion or visible pneumothorax.   Focal airspace opacity in the right lower lobe and left upper lobe.   No suspicious osseous lesion.       Impression:          Multifocal bilateral airspace opacity, suspicious for multifocal  pneumonia. Recommend follow-up imaging to document resolution and  exclude neoplastic process.  This report was finalized on 02/06/2020 16:13 by Dr. Jared Cerrato MD.    CT Abdomen Pelvis Without Contrast [[id number]] Collected:  02/06/20 1555     Updated:  02/06/20 1607    Narrative:       EXAMINATION: CT ABDOMEN PELVIS WO CONTRAST-  2/6/2020 3:55 PM CST     HISTORY: Abdominal pain     COMPARISON:None     TECHNIQUE:  Radiation dose equals  mGy-cm.  Automated exposure  control dose reduction technique was implemented.     Thin section axial imaging was obtained. 2-D sagittal and coronal  reconstruction images were generated.     Intravenous contrast was not administered.     Oral contrast was not ingested.     FINDINGS:     There is imaged part degradation particularly in the lower  abdomen/pelvis due to patient motion.     There is a pleural-based, masslike-infiltrative airspace consolidation  in the right lower lobe posterior medially with focal area of cystic  change centrally within the consolidation. Acute infectious/inflammatory  process considered. Malignancy could also have this appearance.  Correlate with patient presentation.     There is no CT evidence of gallstones.     There is no focal hepatic lesions. There is no biliary ductal  dilatation.      The spleen is nonenlarged.     There are no adrenal masses.     There are no pancreatic lesions.     There are no urinary tract calculi.     There is no pelvocaliectasis or hydroureter. The urinary bladder is  mildly distended. There is no focal bladder wall abnormality. Prostate  gland is not enlarged.     There is moderate amount of stool identified throughout the colon which  is not dilated. The appendix is not inflamed.     The small bowel is not dilated.     The duodenal sweep is imaged appropriately.     The stomach is not distended.     There are no pathologically enlarged lymph nodes.     There is atherosclerotic aorto iliac and femoral calcifications.     There is no ascites or pneumoperitoneum.     No acute osseous abnormalities observed.     There is a 2 cm lytic lesion with sclerotic border identified in the  right ilium near the SI joint, nonaggressive appearing.       Impression:       1. Infiltrative masslike area of airspace consolidation in the right  lower lobe, pleural-based with focal cystic change centrally.  Differential considerations include infectious/inflammatory change,  pneumonia, as well as pulmonary neoplasm/malignancy. Correlate with  patient presentation, follow-up recommended.  2. Atherosclerotic calcifications.  3. No CT evidence of acute intra-abdominal/pelvic pathological process.  This report was finalized on 02/06/2020 16:04 by Dr. Cortes Ruff MD.            I have personally reviewed and interpreted the radiology studies and ECG obtained at time of admission.     Assessment / Plan     Assessment:   Active Hospital Problems    Diagnosis   • Pneumonia of right lower lobe due to infectious organism (CMS/HCC)   • Type 2 diabetes mellitus with hyperglycemia, with long-term current use of insulin (CMS/HCC)   • Leukocytosis   • Normocytic anemia   • Right-sided chest wall pain   Concerns for lung cancer with metastatic disease due to sclerotic lesion border right ilium near SI  joint    Plan:   1.  Admit as inpatient  2.  Consult pulmonology  3.  Home medication reviewed  4.  DVT prophylaxis with SCDs  5.  Continue Rocephin and azithromycin  6.  Incentive spirometry, supplemental O2, continuous pulse oximetry, nebs, ABGs as needed  7.  RT to initiate breathing treatment protocol  8.  Additional labs anemia studies, urine for Legionella and strep pneumoniae, respiratory culture, urinalysis with culture  9.  Labs in a.m.  10.  Normal saline 100 mL/hour  11.  Monitor glucose before meals and at bedtime with regular insulin sliding scale coverage  12.  Pain management  13.  N.p.o. after midnight for possible biopsy in a.m.    I discussed the patient's findings and my recommendations with: Tay London MD  Time spent: 40 minutes    Patient seen and examined by me on 2/6/2020 at 6:50 PM.    DENISHA Jerome  02/06/20   8:13 PM     I personally evaluated and examined the patient in conjunction with  Eliseo DENNY and agree with the assessment, treatment plan, and disposition of the patient as recorded by her. My history, exam, and further recommendations are:     Vitals:    02/06/20 1950   BP: 104/66   Pulse: 79   Resp: 16   Temp: 98.4 °F (36.9 °C)   SpO2: 100%   He's a 54-year-old man who came to the emergency room for evaluation of abdominal  pain. He's admitted with a diagnosis of right lower lobe pneumonia. Review of records indicates that he has been complaining of right sided pain that radiates his back/side after his abdomen worse with movement and coughing. His vital signs are stable but slightly tachycardic.  CXR on my review showed fullness of right hilum. However, CT of  abdomen and pelvis in evaluation for abdominal  pain apparently showed infiltrative like pleural base  consolidation in the right lower lobe. He received .ceftriaxone, azithromycin, dilaudid,  Zofran and Advil +1 L of IV fluid    On encounter, he said 'I'm hungry  He seems chronically ill.  He has point  "tenderness on right flank, up to the side of his ribs. His BMi is 19.      Will treat for pna. Consult Pulmonary. May need IR for biopsy.   Agree with above  Tay London MD  02/06/20  10:27 PM          Electronically signed by Tay London MD at 02/06/20 2230          Physician Progress Notes (most recent note)      Tay London MD at 02/07/20 0900              [facility] Medicine Services  INPATIENT PROGRESS NOTE    Patient Name: Ford High  Date of Admission: 2/6/2020  Today's Date: 02/07/20  Length of Stay: 1  Primary Care Physician: Denys Yanez Jr., MD    Subjective   Chief Complaint: f/u   HPI   I do not believe that the patient is the best historian.  He actually contradicts his story from the other.  He said that he has diarrhea/bowel movement few times a day but in the recent past had been constipated.  He is wearing diaper currently because he said he has diarrhea but on the other hand he just mentioned to me that he has constipation.    He states that couple of hours after he ate he would have bowel movement.  This has been his norm but again he stated that he has constipation currently.    Despite good appetite he claimed to have lost 100 pounds over 1 year.    He has right flank pain and right sided chest pain.  He said he was just at [facility].  He didn't say much of details on this. He said he \"couldn't walk and was asked to dress up and go\".     He works at [employer].        Review of Systems     All pertinent negatives and positives are as above. All other systems have been reviewed and are negative unless otherwise stated.     Objective    Temp:  [98.4 °F (36.9 °C)-102.1 °F (38.9 °C)] 98.4 °F (36.9 °C)  Heart Rate:  [] 93  Resp:  [16-20] 16  BP: (101-135)/() 101/60  Physical Exam  Constitutional: He is oriented to person, place, and time. He appears well-developed.   Cachectic, looks older " than stated age   HENT:   Head: Normocephalic and atraumatic.   Poor dentition   Eyes: Pupils are equal, round, and reactive to light. EOM are normal.   Neck: Normal range of motion. No JVD present. No tracheal deviation present.   Cardiovascular: Normal rate, regular rhythm and normal heart sounds.   No murmur heard.  Pulmonary/Chest: Effort normal. No respiratory distress.lungs sounds are clear  Tender to touch on right flank, in fact he asked me to take it easy.  I didn't see any superficial mass.   If I remember it right, I don't believe there was many of any bony erosion in this area from imaging study  Abdominal: Soft. Bowel sounds are normal. He exhibits no distension. There is no tenderness.   Musculoskeletal: Normal range of motion. He exhibits no edema.   Neurological: He is alert and oriented to person, place, and time.   Skin: Skin is warm and dry. There is pallor.   Psychiatric: He has a normal mood and affect. His behavior is normal.          Results Review:  I have reviewed the labs, radiology results, and diagnostic studies.    Laboratory Data:   Results from last 7 days   Lab Units 02/07/20  0447 02/06/20  1348   WBC 10*3/mm3 16.07* 12.48*   HEMOGLOBIN g/dL 9.1* 9.3*   HEMATOCRIT % 27.6* 27.9*   PLATELETS 10*3/mm3 368 388        Results from last 7 days   Lab Units 02/07/20  0447 02/06/20  1348   SODIUM mmol/L 141 135*   POTASSIUM mmol/L 4.3 4.2   CHLORIDE mmol/L 102 97*   CO2 mmol/L 29.0 30.0*   BUN mg/dL 10 11   CREATININE mg/dL 0.52* 0.58*   CALCIUM mg/dL 8.5* 8.6   BILIRUBIN mg/dL  --  0.3   ALK PHOS U/L  --  104   ALT (SGPT) U/L  --  11   AST (SGOT) U/L  --  12   GLUCOSE mg/dL 285* 265*       Culture Data:   Blood Culture   Date Value Ref Range Status   02/06/2020 No growth at less than 24 hours  Preliminary   02/06/2020 No growth at less than 24 hours  Preliminary       Radiology Data:   Imaging Results (Last 24 Hours)     Procedure Component Value Units Date/Time    XR Chest 1 View  [[id number]] Collected:  02/06/20 1610     Updated:  02/06/20 1616    Narrative:       XR CHEST 1 VW-     Indication: fever. cough     Comparison: Same day CT     Findings:     Cardiac silhouette is upper limits of normal in size.   No pleural effusion or visible pneumothorax.   Focal airspace opacity in the right lower lobe and left upper lobe.   No suspicious osseous lesion.       Impression:          Multifocal bilateral airspace opacity, suspicious for multifocal  pneumonia. Recommend follow-up imaging to document resolution and  exclude neoplastic process.  This report was finalized on 02/06/2020 16:13 by Dr. Jared Cerrato MD.    CT Abdomen Pelvis Without Contrast [[id number]] Collected:  02/06/20 1555     Updated:  02/06/20 1607    Narrative:       EXAMINATION: CT ABDOMEN PELVIS WO CONTRAST-  2/6/2020 3:55 PM CST     HISTORY: Abdominal pain     COMPARISON:None     TECHNIQUE:  Radiation dose equals  mGy-cm.  Automated exposure  control dose reduction technique was implemented.     Thin section axial imaging was obtained. 2-D sagittal and coronal  reconstruction images were generated.     Intravenous contrast was not administered.     Oral contrast was not ingested.     FINDINGS:     There is imaged part degradation particularly in the lower  abdomen/pelvis due to patient motion.     There is a pleural-based, masslike-infiltrative airspace consolidation  in the right lower lobe posterior medially with focal area of cystic  change centrally within the consolidation. Acute infectious/inflammatory  process considered. Malignancy could also have this appearance.  Correlate with patient presentation.     There is no CT evidence of gallstones.     There is no focal hepatic lesions. There is no biliary ductal  dilatation.     The spleen is nonenlarged.     There are no adrenal masses.     There are no pancreatic lesions.     There are no urinary tract calculi.     There is no pelvocaliectasis or hydroureter. The  urinary bladder is  mildly distended. There is no focal bladder wall abnormality. Prostate  gland is not enlarged.     There is moderate amount of stool identified throughout the colon which  is not dilated. The appendix is not inflamed.     The small bowel is not dilated.     The duodenal sweep is imaged appropriately.     The stomach is not distended.     There are no pathologically enlarged lymph nodes.     There is atherosclerotic aorto iliac and femoral calcifications.     There is no ascites or pneumoperitoneum.     No acute osseous abnormalities observed.     There is a 2 cm lytic lesion with sclerotic border identified in the  right ilium near the SI joint, nonaggressive appearing.       Impression:       1. Infiltrative masslike area of airspace consolidation in the right  lower lobe, pleural-based with focal cystic change centrally.  Differential considerations include infectious/inflammatory change,  pneumonia, as well as pulmonary neoplasm/malignancy. Correlate with  patient presentation, follow-up recommended.  2. Atherosclerotic calcifications.  3. No CT evidence of acute intra-abdominal/pelvic pathological process.  This report was finalized on 02/06/2020 16:04 by Dr. Cortes Ruff MD.          I have reviewed the patient's current medications.     Assessment/Plan     Active Hospital Problems    Diagnosis   • Pneumonia of right lower lobe due to infectious organism (CMS/HCC)   • Type 2 diabetes mellitus with hyperglycemia, with long-term current use of insulin (CMS/HCC)   • Leukocytosis   • Normocytic anemia   • Right-sided chest wall pain   constipation  Hx of ?IBS  Weight loss; suspected malignancy of lung on top of poorly controlled DM  Hyperglycemia/Glucosuria/poorly controlled DM - Aic 15%  Iron def anemia - ? Malnutrition vs chronic disease of both   Elevated ferritin - ? Acute phase reactant        Pulmonary consult - discussed with Catalina; requesting recommendation on eval and Vazquez  "need biopsy - ? IR   Continue treatment for PNA; follow cultures; negative Legionella and strep pneumoniae antigen  LDL 64  Iron supplement   Nutritionist and diabetic eductor  Will place on levemir and cont ssi  Discussed with nurse Aaron is currently npo pending possibility of biopsy if felt appropriate from Pulmonary or alternative given on work up     azithromycin 500 mg Intravenous Q24H   cefTRIAXone 1 g Intravenous Q24H   insulin lispro 0-14 Units Subcutaneous 4x Daily With Meals & Nightly   ipratropium-albuterol 3 mL Nebulization 4x Daily - RT   sodium chloride 10 mL Intravenous Q12H         Discharge Planning: to be determined  Tay London MD   20   9:00 AM  sakle    Electronically signed by Tay London MD at 20 0921          Consult Notes (most recent note)      Navin Mckenna MD at 20 0800      Consult Orders    1. Inpatient Pulmonology Consult [[id number]] ordered by Nancy Bassett APRN at 20 1946                      PULMONARY & CRITICAL CARE CONSULT - [facility]    20, 8:00 AM  Patient Care Team:  Denys Yanez Jr., MD as PCP - General (Family Medicine)  Name: Ford High  : 1965  MRN: [id number]  Contact Serial Number [id number]    Chief complaint: Right-sided flank pain    HPI:  We have been consulted by Tay London* to see this 54 y.o. male born on 1965.  Patient complains of right-sided flank pain x3 days.  He says he was recently seen at [facility] for nausea, vomiting and diarrhea.  He is currently not having any of those problems on this hospitalization.  He also has a history of orthostatic hypotension.  He is a non-smoker and has no known pulmonary history.  We have been consulted concerns of a possible \"lung mass\".  The patient had a CT of the abdomen on admission that shows a masslike infiltrate in the right lower lobe.  At this point, we would be more concerned for a " "pneumonia and we will hold off with any plans for doing any type of biopsy.  The patient is on room air and denies cough, hemoptysis or shortness of breath.    Past Medical History:   has a past medical history of Autonomic neuropathy, Chronic diarrhea, Diabetes mellitus (CMS/HCC), Diabetic foot ulcer (CMS/HCC), Elevated cholesterol, Lateral epicondylitis, right elbow, Noncompliance, Orthostatic hypotension, and Skin infection.   has a past surgical history that includes Tonsillectomy.  No Known Allergies  Medications:    azithromycin 500 mg Intravenous Q24H   cefTRIAXone 1 g Intravenous Q24H   insulin lispro 0-14 Units Subcutaneous 4x Daily With Meals & Nightly   ipratropium-albuterol 3 mL Nebulization 4x Daily - RT   sodium chloride 10 mL Intravenous Q12H       sodium chloride 100 mL/hr Last Rate: 100 mL/hr (02/06/20 4015)     Family History:  Family History   Problem Relation Age of Onset   • Diabetes Mother    • Arthritis Mother    • Hyperlipidemia Mother    • Heart disease Father    • Diabetes Father    • Arthritis Father      Social History:   reports that he has never smoked. He does not have any smokeless tobacco history on file. He reports that he does not drink alcohol or use drugs.    Review of Systems:  Review of Systems   Constitutional: Positive for unexpected weight change (Recent unintentional weight loss).   HENT: Negative.    Respiratory: Negative for apnea, cough, chest tightness, shortness of breath and wheezing.    Cardiovascular: Negative.  Negative for leg swelling.   Gastrointestinal: Negative.         Frequent GI issues with nausea, vomiting and diarrhea.  Currently the patient is not having those symptoms but he does wear an adult diaper \"just in case\".   Genitourinary: Positive for flank pain (Right side).   Skin: Negative.    Neurological: Positive for light-headedness (Secondary to a history of orthostatic hypotension).   Hematological: Negative for adenopathy. Does not bruise/bleed " easily.   Psychiatric/Behavioral: Negative.         Physical Exam:  Temp:  [98.4 °F (36.9 °C)-102.1 °F (38.9 °C)] 98.4 °F (36.9 °C)  Heart Rate:  [] 93  Resp:  [16-20] 16  BP: (101-135)/() 101/60    Intake/Output Summary (Last 24 hours) at 2/7/2020 0800  Last data filed at 2/7/2020 0300  Gross per 24 hour   Intake 2000 ml   Output 1300 ml   Net 700 ml         02/06/20  1337 02/06/20  1814   Weight: 70.3 kg (155 lb) 70.3 kg (155 lb)     SpO2 Percentage    02/07/20 0616 02/07/20 0646 02/07/20 0651   SpO2: 99% 98% 100%     Physical Exam   Constitutional: He is oriented to person, place, and time. He appears well-developed and well-nourished.   Appears older than his stated age   HENT:   Head: Normocephalic and atraumatic.   Eyes: Pupils are equal, round, and reactive to light.   Neck: Normal range of motion. Neck supple.   Cardiovascular: Normal rate and regular rhythm.   Pulmonary/Chest: Effort normal and breath sounds normal.   Abdominal: Soft. Bowel sounds are normal. He exhibits no distension.   Musculoskeletal: Normal range of motion.   Neurological: He is alert and oriented to person, place, and time.   Skin: Skin is warm and dry. There is pallor.   Psychiatric: He has a normal mood and affect.   Nursing note and vitals reviewed.       Results from last 7 days   Lab Units 02/07/20  0447 02/06/20  1348   WBC 10*3/mm3 16.07* 12.48*   HEMOGLOBIN g/dL 9.1* 9.3*   PLATELETS 10*3/mm3 368 388     Results from last 7 days   Lab Units 02/07/20  0447 02/06/20  1348   SODIUM mmol/L 141 135*   POTASSIUM mmol/L 4.3 4.2   CO2 mmol/L 29.0 30.0*   BUN mg/dL 10 11   CREATININE mg/dL 0.52* 0.58*   GLUCOSE mg/dL 285* 265*         No results found for: PROBNP  Blood Culture   Date Value Ref Range Status   02/06/2020 No growth at less than 24 hours  Preliminary   02/06/2020 No growth at less than 24 hours  Preliminary     Recent radiology:   Imaging Results (Last 72 Hours)     Procedure Component Value Units Date/Time     XR Chest 1 View [[id number]] Collected:  02/06/20 1610     Updated:  02/06/20 1616    Narrative:       XR CHEST 1 VW-     Indication: fever. cough     Comparison: Same day CT     Findings:     Cardiac silhouette is upper limits of normal in size.   No pleural effusion or visible pneumothorax.   Focal airspace opacity in the right lower lobe and left upper lobe.   No suspicious osseous lesion.       Impression:          Multifocal bilateral airspace opacity, suspicious for multifocal  pneumonia. Recommend follow-up imaging to document resolution and  exclude neoplastic process.  This report was finalized on 02/06/2020 16:13 by Dr. Jared Cerrato MD.    CT Abdomen Pelvis Without Contrast [[id number]] Collected:  02/06/20 1555     Updated:  02/06/20 1607    Narrative:       EXAMINATION: CT ABDOMEN PELVIS WO CONTRAST-  2/6/2020 3:55 PM CST     HISTORY: Abdominal pain     COMPARISON:None     TECHNIQUE:  Radiation dose equals  mGy-cm.  Automated exposure  control dose reduction technique was implemented.     Thin section axial imaging was obtained. 2-D sagittal and coronal  reconstruction images were generated.     Intravenous contrast was not administered.     Oral contrast was not ingested.     FINDINGS:     There is imaged part degradation particularly in the lower  abdomen/pelvis due to patient motion.     There is a pleural-based, masslike-infiltrative airspace consolidation  in the right lower lobe posterior medially with focal area of cystic  change centrally within the consolidation. Acute infectious/inflammatory  process considered. Malignancy could also have this appearance.  Correlate with patient presentation.     There is no CT evidence of gallstones.     There is no focal hepatic lesions. There is no biliary ductal  dilatation.     The spleen is nonenlarged.     There are no adrenal masses.     There are no pancreatic lesions.     There are no urinary tract calculi.     There is no pelvocaliectasis or  hydroureter. The urinary bladder is  mildly distended. There is no focal bladder wall abnormality. Prostate  gland is not enlarged.     There is moderate amount of stool identified throughout the colon which  is not dilated. The appendix is not inflamed.     The small bowel is not dilated.     The duodenal sweep is imaged appropriately.     The stomach is not distended.     There are no pathologically enlarged lymph nodes.     There is atherosclerotic aorto iliac and femoral calcifications.     There is no ascites or pneumoperitoneum.     No acute osseous abnormalities observed.     There is a 2 cm lytic lesion with sclerotic border identified in the  right ilium near the SI joint, nonaggressive appearing.       Impression:       1. Infiltrative masslike area of airspace consolidation in the right  lower lobe, pleural-based with focal cystic change centrally.  Differential considerations include infectious/inflammatory change,  pneumonia, as well as pulmonary neoplasm/malignancy. Correlate with  patient presentation, follow-up recommended.  2. Atherosclerotic calcifications.  3. No CT evidence of acute intra-abdominal/pelvic pathological process.  This report was finalized on 02/06/2020 16:04 by Dr. Cortes Ruff MD.        My radiograph interpretation/independent review of imaging: Right lower lobe and left upper lobe opacities  Other test results (not lab or imaging):   None  Independent review of ekg: Normal sinus rhythm, rate 99, QTc 451    Problem List as identified by Epic (may contain historical, inactive problems)  Patient Active Problem List   Diagnosis   • Pneumonia of right lower lobe due to infectious organism (CMS/HCC)   • Type 2 diabetes mellitus with hyperglycemia, with long-term current use of insulin (CMS/HCC)   • Leukocytosis   • Normocytic anemia   • Right-sided chest wall pain     Pulmonary Assessment:  New problem (to me), with additional workup planned: Bilateral opacities concerning for  pneumonia    New problem (to me), no additional workup planned: Recent weight loss with a reported history of frequent diarrhea    Other problems either stable, failing to improve or worsenin. Leukocytosis  2. Anemia    Recommend/plan:   · We would not recommend a biopsy at this time.  Could consider a CT of the chest?  · Obtain Legionella and strep pneumo antigens.  · Obtain sputum culture if able to produce.  · Continue azithromycin and Rocephin.  · Bronchodilator treatments as needed  · Work with therapies    Thank you for this consult.  We will follow along.  Electronically signed by DENISHA Butler, 20, 8:00 AM.  Physician substantive contribution:  Pertinent symptoms/interval history include: The patient currently is resting comfortably.  Respiratory exam shows pertinent findings of clear lung fields.  Plan includes: I agree with antibiotic therapy and we can get follow-up imaging studies possibly to include a chest CT after he has been on several days of antibiotics.  I have seen and examined patient personally, performing a face-to-face diagnostic evaluation with plan of care reviewed and developed with APRN and nursing staff. I have addended and/or modified the above history of present illness, physical examination, and assessment and plan to reflect my findings and impressions. Essential elements of the care plan were discussed with APRN above.  Agree with findings and assessment/plan as documented above.    Electronically signed by Navin Mckenna MD, on 2020, 2:50 PM            Electronically signed by Navin Mckenna MD at 20 1450       Physical Therapy Notes (most recent note)    No notes exist for this encounter.            Occupational Therapy Notes (most recent note)      Jacqueline Isabel, OTR/L at 20 1350          Acute Care - Occupational Therapy Initial Evaluation  [facility]     Patient Name: Ford High  : 1965  MRN:  [id number]  Today's Date: 2/7/2020  Onset of Illness/Injury or Date of Surgery: 02/06/20  Date of Referral to OT: 02/07/20  Referring Physician: DENISHA Bassett    Admit Date: 2/6/2020       ICD-10-CM ICD-9-CM   1. Pneumonia of right lower lobe due to infectious organism (CMS/HCC) J18.1 486   2. Decreased activities of daily living (ADL) Z78.9 V49.89     Patient Active Problem List   Diagnosis   • Pneumonia of right lower lobe due to infectious organism (CMS/HCC)   • Type 2 diabetes mellitus with hyperglycemia, with long-term current use of insulin (CMS/HCC)   • Leukocytosis   • Normocytic anemia   • Right-sided chest wall pain     Past Medical History:   Diagnosis Date   • Autonomic neuropathy    • Chronic diarrhea    • Diabetes mellitus (CMS/HCC)    • Diabetic foot ulcer (CMS/HCC)    • Elevated cholesterol    • Lateral epicondylitis, right elbow    • Noncompliance    • Orthostatic hypotension    • Skin infection      Past Surgical History:   Procedure Laterality Date   • TONSILLECTOMY            OT ASSESSMENT FLOWSHEET (last 12 hours)      Occupational Therapy Evaluation     Row Name 02/07/20 0841                   OT Evaluation Time/Intention    Subjective Information  complains of;pain;weakness \"always weak\"  -        Document Type  evaluation  -MW        Mode of Treatment  occupational therapy  -MW        Comment  Pt reports he does not walk, however reports only using his wc in his home and walks to his car. Reports he does drive and do his own IADL. Very difficult historian and unsure PLOF. Pt is impulsive and reluctant to all fall prevention protocol requiring increased encouragement/education  -MW           General Information    Patient Profile Reviewed?  yes  -MW        Onset of Illness/Injury or Date of Surgery  02/06/20  -MW        Referring Physician  DENISHA Bassett  -MW        Patient Observations  alert;cooperative;agree to therapy  -MW        Patient/Family Observations  no family present  -MW     "    General Observations of Patient  asleep but arouses to verbal stim  -MW        Prior Level of Function  independent:;all household mobility;ADL's;driving;shopping pt reports he does not walk but does not use wc outside home  -MW        Equipment Currently Used at Home  wheelchair;grab bar  -MW        Pertinent History of Current Functional Problem  Presented to ED with R sided flank pain; dx; PNA  -MW        Existing Precautions/Restrictions  fall  -MW        Limitations/Impairments  safety/cognitive  -MW           Relationship/Environment    Name of Support/Comfort Primary Source  Spouse lives in nursing facility  -MW        Lives With  alone  -MW           Resource/Environmental Concerns    Current Living Arrangements  home/apartment/[facility]  -MW           Cognitive Assessment/Interventions    Additional Documentation  Cognitive Assessment/Intervention (Group)  -MW           Cognitive Assessment/Intervention- PT/OT    Orientation Status (Cognition)  oriented x 4  -MW        Cognitive Function (Cognitive)  safety deficit  -MW        Safety Deficit (Cognitive)  impulsivity;problem solving;insight into deficits/self awareness;awareness of need for assistance  -MW        Personal Safety Interventions  fall prevention program maintained;gait belt;muscle strengthening facilitated;nonskid shoes/slippers when out of bed;supervised activity  -MW           Safety Issues, Functional Mobility    Impairments Affecting Function (Mobility)  balance;endurance/activity tolerance;pain;strength;cognition  -MW           Bed Mobility Assessment/Treatment    Bed Mobility Assessment/Treatment  supine-sit  -MW        Supine-Sit [place] (Bed Mobility)  supervision  -MW           Functional Mobility    Functional Mobility- Ind. Level  supervision required  -MW        Functional Mobility- Comment  impulsively stands and takes a couple steps bed to chair  -MW           Transfer Assessment/Treatment    Transfer  Assessment/Treatment  sit-stand transfer  -        Comment (Transfers)  impulsive with transfer  -           Sit-Stand Transfer    Sit-Stand [place] (Transfers)  supervision  -           ADL Assessment/Intervention    BADL Assessment/Intervention  lower body dressing  -           Lower Body Dressing Assessment/Training    Lower Body Dressing [place] Level  don;socks;undergarment;set up;supervision  -        Lower Body Dressing Position  supported sitting  -           BADL Safety/Performance    Impairments, BADL Safety/Performance  balance;cognition;endurance/activity tolerance;pain;strength  -           General ROM    GENERAL ROM COMMENTS  AROM WFL BUE  -           MMT (Manual Muscle Testing)    General MMT Comments  BUE functionally 4-/5, refused formal assessment  -           Motor Assessment/Interventions    Additional Documentation  Balance (Group)  -           Balance    Balance  static sitting balance;static standing balance  -           Static Sitting Balance    Level of [place] (Unsupported Sitting, Static Balance)  supervision  -        Sitting Position (Unsupported Sitting, Static Balance)  sitting on edge of bed  -           Static Standing Balance    Level of [place] (Supported Standing, Static Balance)  supervision  -        Comment (Supported Standing, Static Balance)  stood for very short time only to impulsively transfer to chair  -           Sensory Assessment/Intervention    Sensory General Assessment  no sensation deficits identified  -           Positioning and Restraints    Pre-Treatment Position  in bed  -        Post Treatment Position  chair  -        In Chair  sitting;call light within reach;encouraged to call for assist;exit alarm on;with other staff  -           Pain Assessment    Additional Documentation  Pain Scale: Numbers Pre/Post-Treatment (Group)  -           Pain Scale: Numbers Pre/Post-Treatment    Pain Scale: Numbers,  "Pretreatment  10/10  -MW        Pain Scale: Numbers, Post-Treatment  3/10  -MW        Pain Location - Side  Right  -MW        Pain Location  flank  -MW        Pre/Post Treatment Pain Comment  inconsistent reports of pain, no obs signs of pain  -MW        Pain Intervention(s)  Medication (See MAR);Repositioned;Ambulation/increased activity  -MW           Plan of Care Review    Plan of Care Reviewed With  patient  -MW        Progress  no change  -MW        Outcome Summary  OT eval completed. Pt is a poor historian, unsure of PLOF as pt reports he does not/is not able to walk however does report that he leaves his wheelchair in his home, walks to car, and drives. Pt reports that he runs any errands he needs and cooks his own meals. Demos decreased safety awareness and impulsivity with transfer and taking a couple steps bed>chair, reluctant toward education for fall prevention. Performs LB ADL with set up and S. Pt refuses further evaluation at this time reporting \"I cannot walk, this is all I can do\". OT indicated to address ADL and functional mobility/transfer to increase independence and safety while decreasing fall risk. Pt would benefit from SNF at d/c for continued rehab as he lives alone and is at an increased risk for falls.  -MW           Clinical Impression (OT)    Date of Referral to OT  02/07/20  -MW        OT Diagnosis  decreased ADL  -MW        Prognosis (OT Eval)  good  -MW        Patient/Family Goals Statement (OT Eval)  return home, decrease pain  -MW        Criteria for Skilled Therapeutic Interventions Met (OT Eval)  yes;treatment indicated  -MW        Rehab Potential (OT Eval)  good, to achieve stated therapy goals  -MW        Therapy Frequency (OT Eval)  3 times/wk  -MW        Predicted Duration of Therapy Intervention (Therapy Eval)  until d/c  -MW        Care Plan Review (OT)  evaluation/treatment results reviewed;care plan/treatment goals reviewed;risks/benefits reviewed;patient/other agree to " care plan;current/potential barriers reviewed  -MW        Anticipated Discharge Disposition (OT)  skilled nursing facility;transitional care  -MW           Planned OT Interventions    Planned Therapy Interventions (OT Eval)  activity tolerance training;BADL retraining;cognitive/visual perception retraining;functional balance retraining;occupation/activity based interventions;patient/caregiver education/training;transfer/mobility retraining;strengthening exercise  -MW           OT Goals    Transfer Goal Selection (OT)  transfer, OT goal 1  -MW        Bathing Goal Selection (OT)  bathing, OT goal 1  -MW        Toileting Goal Selection (OT)  toileting, OT goal 1  -MW           Transfer Goal 1 (OT)    Activity/Assistive Device (Transfer Goal 1, OT)  sit-to-stand/stand-to-sit;bed-to-chair/chair-to-bed;toilet;shower chair;walk-in shower  -MW        [place] Level/Cues Needed (Transfer Goal 1, OT)  conditional independence  -MW        Time Frame (Transfer Goal 1, OT)  long term goal (LTG);by discharge  -MW        Progress/Outcome (Transfer Goal 1, OT)  goal ongoing  -MW           Bathing Goal 1 (OT)    Activity/Assistive Device (Bathing Goal 1, OT)  bathing skills, all;shower chair  -MW        [place] Level/Cues Needed (Bathing Goal 1, OT)  supervision required  -MW        Time Frame (Bathing Goal 1, OT)  long term goal (LTG);by discharge  -MW        Progress/Outcomes (Bathing Goal 1, OT)  goal ongoing  -MW           Toileting Goal 1 (OT)    Activity/Device (Toileting Goal 1, OT)  toileting skills, all;commode  -MW        [place] Level/Cues Needed (Toileting Goal 1, OT)  conditional independence  -MW        Time Frame (Toileting Goal 1, OT)  long term goal (LTG);by discharge  -MW        Progress/Outcome (Toileting Goal 1, OT)  goal ongoing  -MW           Living Environment    Home Accessibility  tub/shower is not walk in elevator at [facility]  -MW          User Key  (r) = Recorded By, (t) = Taken By, (c)  "= Cosigned By    Initials Name Effective Dates    MW Jacqueline Isabel, OTR/L 08/28/18 -                OT Recommendation and Plan  Outcome Summary/Treatment Plan (OT)  Anticipated Discharge Disposition (OT): skilled nursing facility, transitional care  Planned Therapy Interventions (OT Eval): activity tolerance training, BADL retraining, cognitive/visual perception retraining, functional balance retraining, occupation/activity based interventions, patient/caregiver education/training, transfer/mobility retraining, strengthening exercise  Therapy Frequency (OT Eval): 3 times/wk  Plan of Care Review  Plan of Care Reviewed With: patient  Plan of Care Reviewed With: patient  Outcome Summary: OT eval completed. Pt is a poor historian, unsure of PLOF as pt reports he does not/is not able to walk however does report that he leaves his wheelchair in his home, walks to car, and drives. Pt reports that he runs any errands he needs and cooks his own meals. Demos decreased safety awareness and impulsivity with transfer and taking a couple steps bed>chair, reluctant toward education for fall prevention. Performs LB ADL with set up and S. Pt refuses further evaluation at this time reporting \"I cannot walk, this is all I can do\". OT indicated to address ADL and functional mobility/transfer to increase independence and safety while decreasing fall risk. Pt would benefit from SNF at d/c for continued rehab as he lives alone and is at an increased risk for falls.    Outcome Measures     Row Name 02/07/20 1300             How much help from another is currently needed...    Putting on and taking off regular lower body clothing?  3  -MW      Bathing (including washing, rinsing, and drying)  3  -MW      Toileting (which includes using toilet bed pan or urinal)  3  -MW      Putting on and taking off regular upper body clothing  4  -MW      Taking care of personal grooming (such as brushing teeth)  4  -MW      Eating meals  4  -MW      " AM-PAC 6 Clicks Score (OT)  21  -MW         Functional Assessment    Outcome Measure Options  AM-PAC 6 Clicks Daily Activity (OT)  -MW        User Key  (r) = Recorded By, (t) = Taken By, (c) = Cosigned By    Initials Name Provider Type    Jacqueline Nguyen OTR/L Occupational Therapist          Time Calculation:   Time Calculation- OT     Row Name 02/07/20 1349             Time Calculation- OT    OT Start Time  [id number] spent in chart review and time gathering hx  -MW      OT Stop Time  1017  -MW      OT Time Calculation (min)  27 min  -MW      OT Received On  02/07/20  -MW      OT Goal Re-Cert Due Date  02/17/20  -MW        User Key  (r) = Recorded By, (t) = Taken By, (c) = Cosigned By    Initials Name Provider Type    Jacqueline Nguyen OTR/L Occupational Therapist        Therapy Charges for Today     Code Description Service Date Service Provider Modifiers Qty    64108826239 HC OT EVAL LOW COMPLEXITY 4 2/7/2020 Jacqueline Isabel OTR/L GO 1               MEJIA Bob/L  2/7/2020    Electronically signed by Jacqueline Isabel OTR/L at 02/07/20 1350       Discharge Summary    No notes of this type exist for this encounter.

## 2020-02-07 NOTE — PROGRESS NOTES
Malnutrition Severity Assessment    Patient Name:  Ford High  YOB: 1965  MRN: [id number]  Admit Date:  2/6/2020    Patient meets criteria for : Moderate (non-severe) Malnutrition    Comments:  If in agreement with malnutrition assessment, please attest documentation. Thanks.     Malnutrition Severity Assessment  Malnutrition Type: Chronic Disease - Related Malnutrition     Malnutrition Type (last 8 hours)      Malnutrition Severity Assessment     Row Name 02/07/20 1510       Malnutrition Severity Assessment    Malnutrition Type  Chronic Disease - Related Malnutrition    Row Name 02/07/20 1510       Muscle Loss    Loss of Muscle Mass Findings  Moderate    [place] Region  Moderate - slight depression    Clavicle Bone Region  Moderate - some protrusion in females, visible in males    Acromion Bone Region  Moderate - acromion may slightly protrude    Scapular Bone Region  Moderate - mild depression, bones may show slightly    Row Name 02/07/20 1510       Fat Loss    Subcutaneous Fat Loss Findings  Moderate    Orbital Region   Moderate -  somewhat hollowness, slightly dark circles    Upper Arm Region  Severe - mostly skin, very little space between folds, fingers touch    Thoracic & Lumbar Region  Moderate - ribs visible with mild depressions, iliac crest somewhat prominent    Row Name 02/07/20 1510       Criteria Met (Must meet criteria for severity in at least 2 of these categories: M Wasting, Fat Loss, Fluid, Secondary Signs, Wt. Status, Intake)    Patient meets criteria for   Moderate (non-severe) Malnutrition          Electronically signed by:  Sonia Patton RDN, LD  02/07/20 3:25 PM

## 2020-02-07 NOTE — PROGRESS NOTES
RT Nebulizer Protocol    Assessment tool to be used for patients with existing breathing treatments ordered by hospitalists                                                                  0  1  2  3  4      Respiratory History   No Smoking x     Smoking History      1 Pack/Day      Pulmonary Disease      Exacerbation        Respiratory Rate   Normal   x   20-25      Dyspneic      Accessory Muscles      Severe Dyspnea        Breath Sounds   Clear   x   Crackles      Crackles/ Rhonchi      Wheezing      Absent/ Severe Wheezing        Chest   X-ray   Clear      1 Lobe Infiltration/ Consolidation/ PE      2 Lobe Same Lung Infiltration/ Consolidation/ PE    x   2 Lobe Infiltration/ Both Lungs/ Consolidation/ PE      Both Lungs/ More Than 1 Lobe/ Atelectasis/ Consolidation/  PE        Cough   Strong Non- Productive   x   Excessive Secretions/ Strong Cough      Excessive Secretions/ Weak Cough      Thick Bronchial Secretions/ Weak Cough      Thick Bronchial Secretions/ No Cough        Total Patient Score =  2  0-4=Q4 PRN  5-9=TID and Q4 PRN  10-14=QID and Q3 PRN  15-19=Q4 and Q2 PRN  20=Q3 and Q2 PRN    Bronchopulmonary Hygiene (CPT)   Q4 ATC Copious secretions, dyspnea, unable to sleep, mucus plug    QID & Q4 PRN Moderate secretions    TID Small amounts of secretions w/ poor cough and history of secretions    BID Unable to deep breathe and cough spontaneously       Lung Expansion Therapy (PEP)   Q4 & PRN at night Severe atelectasis, poor oxygenation    QID  High risk for persistent atelectasis, existence of same    TID At risk for developing atelectasis    BID Unable to deep breathe and cough spontaneously     Instruct, 1 follow up Patients able to perform well on their own        Per protocol, pt needs nebs Q4 PRN. Notified pt he would need to call for tx, but still needed to use IS on his own.

## 2020-02-08 LAB
DEPRECATED RDW RBC AUTO: 41 FL (ref 37–54)
ERYTHROCYTE [DISTWIDTH] IN BLOOD BY AUTOMATED COUNT: 12.5 % (ref 12.3–15.4)
GLUCOSE BLDC GLUCOMTR-MCNC: 166 MG/DL (ref 70–130)
GLUCOSE BLDC GLUCOMTR-MCNC: 170 MG/DL (ref 70–130)
GLUCOSE BLDC GLUCOMTR-MCNC: 217 MG/DL (ref 70–130)
GLUCOSE BLDC GLUCOMTR-MCNC: 254 MG/DL (ref 70–130)
HCT VFR BLD AUTO: 24.1 % (ref 37.5–51)
HGB BLD-MCNC: 7.8 G/DL (ref 13–17.7)
HOLD SPECIMEN: NORMAL
MCH RBC QN AUTO: 29 PG (ref 26.6–33)
MCHC RBC AUTO-ENTMCNC: 32.4 G/DL (ref 31.5–35.7)
MCV RBC AUTO: 89.6 FL (ref 79–97)
PLATELET # BLD AUTO: 387 10*3/MM3 (ref 140–450)
PMV BLD AUTO: 9.3 FL (ref 6–12)
RBC # BLD AUTO: 2.69 10*6/MM3 (ref 4.14–5.8)
WBC NRBC COR # BLD: 14.06 10*3/MM3 (ref 3.4–10.8)

## 2020-02-08 PROCEDURE — 85027 COMPLETE CBC AUTOMATED: CPT | Performed by: INTERNAL MEDICINE

## 2020-02-08 PROCEDURE — 25010000002 CEFTRIAXONE PER 250 MG: Performed by: NURSE PRACTITIONER

## 2020-02-08 PROCEDURE — 82962 GLUCOSE BLOOD TEST: CPT

## 2020-02-08 PROCEDURE — 99232 SBSQ HOSP IP/OBS MODERATE 35: CPT | Performed by: INTERNAL MEDICINE

## 2020-02-08 PROCEDURE — 25010000002 IRON SUCROSE PER 1 MG: Performed by: INTERNAL MEDICINE

## 2020-02-08 PROCEDURE — 63710000001 INSULIN DETEMIR PER 5 UNITS: Performed by: INTERNAL MEDICINE

## 2020-02-08 PROCEDURE — 63710000001 INSULIN LISPRO (HUMAN) PER 5 UNITS: Performed by: NURSE PRACTITIONER

## 2020-02-08 PROCEDURE — 25010000002 AZITHROMYCIN PER 500 MG: Performed by: NURSE PRACTITIONER

## 2020-02-08 PROCEDURE — 63710000001 INSULIN LISPRO (HUMAN) PER 5 UNITS: Performed by: INTERNAL MEDICINE

## 2020-02-08 RX ORDER — LIDOCAINE 50 MG/G
1 PATCH TOPICAL
Status: DISCONTINUED | OUTPATIENT
Start: 2020-02-08 | End: 2020-02-11 | Stop reason: HOSPADM

## 2020-02-08 RX ORDER — ATORVASTATIN CALCIUM 10 MG/1
20 TABLET, FILM COATED ORAL NIGHTLY
Status: DISCONTINUED | OUTPATIENT
Start: 2020-02-08 | End: 2020-02-11 | Stop reason: HOSPADM

## 2020-02-08 RX ORDER — DIPHENOXYLATE HYDROCHLORIDE AND ATROPINE SULFATE 2.5; .025 MG/1; MG/1
1 TABLET ORAL
Status: DISCONTINUED | OUTPATIENT
Start: 2020-02-08 | End: 2020-02-11 | Stop reason: HOSPADM

## 2020-02-08 RX ORDER — DIPHENOXYLATE HYDROCHLORIDE AND ATROPINE SULFATE 2.5; .025 MG/1; MG/1
1 TABLET ORAL
Status: DISCONTINUED | OUTPATIENT
Start: 2020-02-08 | End: 2020-02-08

## 2020-02-08 RX ORDER — DICYCLOMINE HCL 20 MG
20 TABLET ORAL 4 TIMES DAILY PRN
Status: DISCONTINUED | OUTPATIENT
Start: 2020-02-08 | End: 2020-02-11 | Stop reason: HOSPADM

## 2020-02-08 RX ADMIN — INSULIN LISPRO 3 UNITS: 100 INJECTION, SOLUTION INTRAVENOUS; SUBCUTANEOUS at 08:08

## 2020-02-08 RX ADMIN — ATORVASTATIN CALCIUM 20 MG: 10 TABLET, FILM COATED ORAL at 20:23

## 2020-02-08 RX ADMIN — CEFTRIAXONE 1 G: 1 INJECTION, POWDER, FOR SOLUTION INTRAMUSCULAR; INTRAVENOUS at 15:50

## 2020-02-08 RX ADMIN — INSULIN LISPRO 5 UNITS: 100 INJECTION, SOLUTION INTRAVENOUS; SUBCUTANEOUS at 17:46

## 2020-02-08 RX ADMIN — INSULIN LISPRO 5 UNITS: 100 INJECTION, SOLUTION INTRAVENOUS; SUBCUTANEOUS at 12:50

## 2020-02-08 RX ADMIN — INSULIN LISPRO 5 UNITS: 100 INJECTION, SOLUTION INTRAVENOUS; SUBCUTANEOUS at 12:51

## 2020-02-08 RX ADMIN — HYDROCODONE BITARTRATE AND ACETAMINOPHEN 1 TABLET: 5; 325 TABLET ORAL at 12:58

## 2020-02-08 RX ADMIN — AZITHROMYCIN MONOHYDRATE 500 MG: 500 INJECTION, POWDER, LYOPHILIZED, FOR SOLUTION INTRAVENOUS at 15:50

## 2020-02-08 RX ADMIN — HYDROCODONE BITARTRATE AND ACETAMINOPHEN 1 TABLET: 5; 325 TABLET ORAL at 03:03

## 2020-02-08 RX ADMIN — LIDOCAINE 1 PATCH: 50 PATCH CUTANEOUS at 15:49

## 2020-02-08 RX ADMIN — IRON SUCROSE 200 MG: 20 INJECTION, SOLUTION INTRAVENOUS at 08:03

## 2020-02-08 RX ADMIN — RIFAXIMIN 550 MG: 550 TABLET ORAL at 20:24

## 2020-02-08 RX ADMIN — INSULIN LISPRO 2 UNITS: 100 INJECTION, SOLUTION INTRAVENOUS; SUBCUTANEOUS at 20:36

## 2020-02-08 RX ADMIN — INSULIN LISPRO 5 UNITS: 100 INJECTION, SOLUTION INTRAVENOUS; SUBCUTANEOUS at 17:45

## 2020-02-08 RX ADMIN — INSULIN DETEMIR 25 UNITS: 100 INJECTION, SOLUTION SUBCUTANEOUS at 20:26

## 2020-02-08 RX ADMIN — SODIUM CHLORIDE, PRESERVATIVE FREE 10 ML: 5 INJECTION INTRAVENOUS at 08:03

## 2020-02-08 RX ADMIN — HYDROCODONE BITARTRATE AND ACETAMINOPHEN 1 TABLET: 5; 325 TABLET ORAL at 17:51

## 2020-02-08 RX ADMIN — HYDROCODONE BITARTRATE AND ACETAMINOPHEN 1 TABLET: 5; 325 TABLET ORAL at 07:04

## 2020-02-08 RX ADMIN — SODIUM CHLORIDE 75 ML/HR: 9 INJECTION, SOLUTION INTRAVENOUS at 07:04

## 2020-02-08 RX ADMIN — RIFAXIMIN 550 MG: 550 TABLET ORAL at 12:50

## 2020-02-08 RX ADMIN — ACETAMINOPHEN 650 MG: 325 TABLET, FILM COATED ORAL at 20:23

## 2020-02-08 NOTE — PROGRESS NOTES
"    PULMONARY AND CRITICAL CARE PROGRESS NOTE - [facility]    Patient: Ford High  1965   MR# [id number]   Acct# [id number]  02/08/20   2:46 PM  Referring Provider: Tay London*    Chief Complaint: Right sided flank pain  Interval history: He is lying in bed and voiced it was about time I got in there. When asked if he knew who I was, he thought I was his nurse. His influenza, Strep Pneumonia, and Legionella were all negative. Blood cultures x 2 NGTD x 24 hours. Per nursing, he was on Room air all day yesterday and then was placed on 2L NC last night with sat of 86% this am. He was repositioned and his sat came up. He has no other complaints today other than waiting on nursing to get cleaned up. Nursing is waiting on aid to help her. He is advised of this.   Meds:    atorvastatin 20 mg Oral Nightly   azithromycin 500 mg Intravenous Q24H   cefTRIAXone 1 g Intravenous Q24H   diphenoxylate-atropine 1 tablet Oral Daily With Breakfast   insulin detemir 25 Units Subcutaneous Nightly   insulin lispro 0-14 Units Subcutaneous 4x Daily With Meals & Nightly   insulin lispro 5 Units Subcutaneous TID With Meals   iron sucrose (VENOFER) IVPB 200 mg Intravenous Q24H   lidocaine 1 patch Transdermal Q24H   riFAXIMin 550 mg Oral Q8H   sodium chloride 10 mL Intravenous Q12H        Review of Systems:   Review of Systems   Constitutional: Positive for unexpected weight change (Recent unintentional weight loss).   HENT: Negative.    Respiratory: Negative for apnea, cough, chest tightness, shortness of breath and wheezing.    Cardiovascular: Negative.  Negative for leg swelling.   Gastrointestinal: Negative.         Frequent GI issues with nausea, vomiting and diarrhea.  Currently the patient is not having those symptoms but he does wear an adult diaper \"just in case\".   Genitourinary: Positive for flank pain (Right side).   Skin: Negative.    Neurological: Positive for light-headedness (Secondary " to a history of orthostatic hypotension).   Hematological: Negative for adenopathy. Does not bruise/bleed easily.   Psychiatric/Behavioral: Negative.    Physical Exam:  SpO2 Percentage    02/08/20 0306 02/08/20 0810 02/08/20 1156   SpO2: 96% 95% 96%     Temp:  [97.6 °F (36.4 °C)-99.3 °F (37.4 °C)] 97.8 °F (36.6 °C)  Heart Rate:  [] 94  Resp:  [16] 16  BP: (111-120)/(51-72) 115/65    Intake/Output Summary (Last 24 hours) at 2/8/2020 1446  Last data filed at 2/8/2020 0704  Gross per 24 hour   Intake 1000 ml   Output 200 ml   Net 800 ml     Physical Exam   Constitutional: He is oriented to person, place, and time. He appears well-developed and well-nourished.   Appears older than his stated age   HENT:    Head: Normocephalic and atraumatic.   Eyes: Pupils are equal, round, and reactive to light.   Neck: Normal range of motion. Neck supple.   Cardiovascular: Normal rate and regular rhythm.   Pulmonary/Chest: Effort normal and breath sounds normal.   Abdominal: Soft. Bowel sounds are normal. He exhibits no distension.   Musculoskeletal: Normal range of motion.   Neurological: He is alert and oriented to person, place, and time.   Skin: Skin is warm and dry. There is pallor. TTP of the right flank with just placing the stethoscope on him   Psychiatric: He has a normal mood and affect.   Nursing note and vitals reviewed.  Laboratory Data:  Results from last 7 days   Lab Units 02/08/20  0519 02/07/20  0447 02/06/20  1348   WBC 10*3/mm3 14.06* 16.07* 12.48*   HEMOGLOBIN g/dL 7.8* 9.1* 9.3*   PLATELETS 10*3/mm3 387 368 388     Results from last 7 days   Lab Units 02/07/20  0447 02/06/20  1348   SODIUM mmol/L 141 135*   POTASSIUM mmol/L 4.3 4.2   BUN mg/dL 10 11   CREATININE mg/dL 0.52* 0.58*   INR   --  1.03         Blood Culture   Date Value Ref Range Status   02/06/2020 No growth at 24 hours  Preliminary   02/06/2020 No growth at 24 hours  Preliminary     Recent films:  Ct Abdomen Pelvis Without Contrast    Result  Date: 2/6/2020  1. Infiltrative masslike area of airspace consolidation in the right lower lobe, pleural-based with focal cystic change centrally. Differential considerations include infectious/inflammatory change, pneumonia, as well as pulmonary neoplasm/malignancy. Correlate with patient presentation, follow-up recommended. 2. Atherosclerotic calcifications. 3. No CT evidence of acute intra-abdominal/pelvic pathological process. This report was finalized on 02/06/2020 16:04 by Dr. Cortes Ruff MD.    Xr Chest 1 View    Result Date: 2/6/2020   Multifocal bilateral airspace opacity, suspicious for multifocal pneumonia. Recommend follow-up imaging to document resolution and exclude neoplastic process. This report was finalized on 02/06/2020 16:13 by Dr. Jared Cerrato MD.    Films reviewed personally by me.  My interpretation: no new    Pulmonary Assessment:  New problem (to me), with additional workup planned:   1. Bilateral opacities concerning for pneumonia     New problem (to me), no additional workup planned:   1. Recent weight loss with a reported history of frequent diarrhea     Other problems either stable, failing to improve or worsening:  Leukocytosis  Anemia     Recommend/plan:   · Legionella and strep pneumo antigens-negative   · Obtain sputum culture if able to produce.  · Continue azithromycin and Rocephin.  · Bronchodilator treatments as needed  · Work with therapies  · Recommend follow up with PCP in a couple of weeks with a CT of the chest. Follow up with Pulmonology as needed.   · Pulmonology will sign off.     Electronically signed by DENISHA Chowdary, 02/08/20, 2:46 PM   Physician substantive contribution:  Pertinent symptoms/interval history include: The patient is resting comfortably this afternoon.  Respiratory exam shows pertinent findings of clear lung fields.  Plan includes: I do not think there is any need for a short-term chest CT as his right basilar infiltrate is well seen on  his abdominal CT.  I would recommend a follow-up CT which could be done as a standard chest CT in a few weeks per his family physician and if the infiltrate improves he can just be followed with serial imaging studies.  If it does not then his family physician could refer him to radiology for consideration of a needle biopsy.  Otherwise pulmonary will sign off.  I have seen and examined patient personally, performing a face-to-face diagnostic evaluation with plan of care reviewed and developed with APRN and nursing staff. I have addended and/or modified the above history of present illness, physical examination, and assessment and plan to reflect my findings and impressions. Essential elements of the care plan were discussed with APRN above.  Agree with findings and assessment/plan as documented above.    Electronically signed by Navin Mckenna MD, on 2/8/2020, 5:34 PM

## 2020-02-08 NOTE — PROGRESS NOTES
RT Nebulizer Protocol    Assessment tool to be used for patients with existing breathing treatments ordered by hospitalists                                                                  0  1  2  3  4      Respiratory History   No Smoking   X   Smoking History      1 Pack/Day      Pulmonary Disease      Exacerbation        Respiratory Rate   Normal   X   20-25      Dyspneic      Accessory Muscles      Severe Dyspnea        Breath Sounds   Clear   X   Crackles      Crackles/ Rhonchi      Wheezing      Absent/ Severe Wheezing        Chest   X-ray   Clear      1 Lobe Infiltration/ Consolidation/ PE      2 Lobe Same Lung Infiltration/ Consolidation/ PE       2 Lobe Infiltration/ Both Lungs/ Consolidation/ PE      Both Lungs/ More Than 1 Lobe/ Atelectasis/ Consolidation/  PE        Cough   Strong Non- Productive   X   Excessive Secretions/ Strong Cough      Excessive Secretions/ Weak Cough      Thick Bronchial Secretions/ Weak Cough    Thick Bronchial Secretions/ No Cough        Total Patient Score =  0  0-4=Q4 PRN  5-9=TID and Q4 PRN  10-14=QID and Q3 PRN  15-19=Q4 and Q2 PRN  20=Q3 and Q2 PRN    Bronchopulmonary Hygiene (CPT)   Q4 ATC Copious secretions, dyspnea, unable to sleep, mucus plug    QID & Q4 PRN Moderate secretions    TID Small amounts of secretions w/ poor cough and history of secretions    BID Unable to deep breathe and cough spontaneously       Lung Expansion Therapy (PEP)   Q4 & PRN at night Severe atelectasis, poor oxygenation    QID  High risk for persistent atelectasis, existence of same    TID At risk for developing atelectasis    BID Unable to deep breathe and cough spontaneously     Instruct, 1 follow up Patients able to perform well on their own        No current chest xray.  Patient has no smoking history or home meds.  Continue current regimen.

## 2020-02-08 NOTE — PROGRESS NOTES
1           [facility] Medicine Services  INPATIENT PROGRESS NOTE    Patient Name: Ford High  Date of Admission: 2/6/2020  Today's Date: 02/08/20  Length of Stay: 2  Primary Care Physician: Denys Yanez Jr., MD    Subjective   Chief Complaint: Follow-up  HPI   Patient requested a referral to [facility] upon discharge.  He presented in the emergency room on February 6 with complaint of right-sided pain.  On evaluation he was found with right lower lobe mass with concern for neoplastic process and a 2 cm lytic lesion with sclerotic border right ilium near the sacroiliac joint.  He has no complaints in reference to this.    He has poorly controlled diabetes with A1c of 15%, LDL is at goal at 64.  He is receiving treatment for possible pneumonia as evidenced by CT of the abdomen and pelvis showing the above findings.  Dr. Parsons had seen the patient in consult and recommends continuing the antibiotic and considering follow-up imaging studies possibly to include his chest CT several days after.  Biopsy not recommended presently but being considered in the future.      He is afebrile.  Oxygen saturation is 95%  He had a T-max of 102.1 on February 6.    He said he has IBS and he is more on the diarrhea type.  He is requesting that his antidiarrheal be started.  Again he is not the best historian.  Yesterday he just told me that he has constipation.  Looking at it, he has constipation alternating with diarrhea very likely.    He describes pleuritic chest discomfort.  No active bleeding  Review of Systems     All pertinent negatives and positives are as above. All other systems have been reviewed and are negative unless otherwise stated.     Objective    Temp:  [97.6 °F (36.4 °C)-99.3 °F (37.4 °C)] 97.7 °F (36.5 °C)  Heart Rate:  [] 96  Resp:  [16] 16  BP: (101-120)/(51-72) 114/62  Physical Exam   He is simple minded  Constitutional: He is oriented to person, place, and time.  He appears well-developed.   Cachectic, looks older than stated age   HENT:   Head: Normocephalic and atraumatic.   Poor dentition   Eyes: Pupils are equal, round. EOM are normal.   Neck: Normal range of motion. No JVD present. No tracheal deviation present.   Cardiovascular: Normal rate, regular rhythm and normal heart sounds.   No murmur heard.  Pulmonary/Chest: Effort normal. No respiratory distress.lungs sounds are clear  Tender to touch on right flank.  I didn't see or feel for any superficial mass.   Abdominal: Soft. Bowel sounds are normal. He exhibits no distension. There is no tenderness.   Musculoskeletal: Normal range of motion. He exhibits no edema.   Neurological: He is alert and oriented to person, place, and time.   Skin: Skin is warm and dry. There is pallor.   Psychiatric: He has a normal mood and affect. His behavior is normal.          Results Review:  I have reviewed the labs, radiology results, and diagnostic studies.    Laboratory Data:   Results from last 7 days   Lab Units 02/08/20  0519 02/07/20  0447 02/06/20  1348   WBC 10*3/mm3 14.06* 16.07* 12.48*   HEMOGLOBIN g/dL 7.8* 9.1* 9.3*   HEMATOCRIT % 24.1* 27.6* 27.9*   PLATELETS 10*3/mm3 387 368 388        Results from last 7 days   Lab Units 02/07/20  0447 02/06/20  1348   SODIUM mmol/L 141 135*   POTASSIUM mmol/L 4.3 4.2   CHLORIDE mmol/L 102 97*   CO2 mmol/L 29.0 30.0*   BUN mg/dL 10 11   CREATININE mg/dL 0.52* 0.58*   CALCIUM mg/dL 8.5* 8.6   BILIRUBIN mg/dL  --  0.3   ALK PHOS U/L  --  104   ALT (SGPT) U/L  --  11   AST (SGOT) U/L  --  12   GLUCOSE mg/dL 285* 265*       Culture Data:   Blood Culture   Date Value Ref Range Status   02/06/2020 No growth at 24 hours  Preliminary   02/06/2020 No growth at 24 hours  Preliminary       Radiology Data:   Imaging Results (Last 24 Hours)     ** No results found for the last 24 hours. **          I have reviewed the patient's current medications.     Assessment/Plan     Active Hospital Problems     Diagnosis   • Pneumonia of right lower lobe due to infectious organism (CMS/HCC)   • Type 2 diabetes mellitus with hyperglycemia, with long-term current use of insulin (CMS/HCC)   • Leukocytosis   • Normocytic anemia   • Right-sided chest wall pain     Hx of ?IBS constipation/diarrhea  Weight loss; suspected malignancy of lung on top of poorly controlled DM  Hyperglycemia/Glucosuria/poorly controlled DM - Aic 15%  Iron def anemia - ? Malnutrition vs chronic disease of both   Elevated ferritin - ? Acute phase         Legionella and strep pneumococcal antigens are negative, influenza a and B are negative, blood culture showed no growth to date  Continue ceftriaxone and Zithromax day 3  Leukocytosis trending down  Day 2 of 3 of  Venofer for iron deficiency anemia.  He is age 54.  If he has not had any screening colonoscopy, this is recommended and can be done in an outpatient setting  Diabetic educator and nutritionist consulted.  Accu-Chek yesterday were 340, 295, 463, 414 and 298.  Today this morning it is 166.  We were able to verify his home medication and will increase his Levemir and placed on pre-meal insulin.  My concern is he is not compliant that if I give him the full dose that is listed he may end up with hypoglycemia.  Stop IV fluid.  Add Lidoderm patch.  Started on Lomotil every morning before breakfast as requested by patient (IBS with diarrhea predominance)    azithromycin 500 mg Intravenous Q24H   cefTRIAXone 1 g Intravenous Q24H   insulin detemir 10 Units Subcutaneous Nightly   insulin lispro 0-14 Units Subcutaneous 4x Daily With Meals & Nightly   iron sucrose (VENOFER) IVPB 200 mg Intravenous Q24H   sodium chloride 10 mL Intravenous Q12H     Increase activities as tolerated    Discharge Planning:to be determined  Tay London MD   02/08/20   9:14 AM

## 2020-02-08 NOTE — PLAN OF CARE
Problem: Patient Care Overview  Goal: Plan of Care Review  Outcome: Ongoing (interventions implemented as appropriate)  Flowsheets (Taken 2/8/2020 2109)  Progress: no change  Plan of Care Reviewed With: patient  Outcome Summary: pt continues to c/o pain; pain med given with relief noted; pt has rested well; VSS; safety maintained; will continue to monitor

## 2020-02-09 LAB
GLUCOSE BLDC GLUCOMTR-MCNC: 163 MG/DL (ref 70–130)
GLUCOSE BLDC GLUCOMTR-MCNC: 202 MG/DL (ref 70–130)
GLUCOSE BLDC GLUCOMTR-MCNC: 203 MG/DL (ref 70–130)
GLUCOSE BLDC GLUCOMTR-MCNC: 65 MG/DL (ref 70–130)

## 2020-02-09 PROCEDURE — 25010000002 AZITHROMYCIN PER 500 MG: Performed by: NURSE PRACTITIONER

## 2020-02-09 PROCEDURE — 63710000001 INSULIN LISPRO (HUMAN) PER 5 UNITS: Performed by: INTERNAL MEDICINE

## 2020-02-09 PROCEDURE — 25010000002 IRON SUCROSE PER 1 MG: Performed by: INTERNAL MEDICINE

## 2020-02-09 PROCEDURE — 63710000001 INSULIN LISPRO (HUMAN) PER 5 UNITS: Performed by: NURSE PRACTITIONER

## 2020-02-09 PROCEDURE — 25010000002 CEFTRIAXONE PER 250 MG: Performed by: NURSE PRACTITIONER

## 2020-02-09 PROCEDURE — 63710000001 INSULIN DETEMIR PER 5 UNITS: Performed by: INTERNAL MEDICINE

## 2020-02-09 PROCEDURE — 82962 GLUCOSE BLOOD TEST: CPT

## 2020-02-09 RX ORDER — HYDROCODONE BITARTRATE AND ACETAMINOPHEN 7.5; 325 MG/1; MG/1
1 TABLET ORAL EVERY 6 HOURS PRN
Status: DISCONTINUED | OUTPATIENT
Start: 2020-02-09 | End: 2020-02-11 | Stop reason: HOSPADM

## 2020-02-09 RX ADMIN — INSULIN DETEMIR 25 UNITS: 100 INJECTION, SOLUTION SUBCUTANEOUS at 20:48

## 2020-02-09 RX ADMIN — RIFAXIMIN 550 MG: 550 TABLET ORAL at 06:23

## 2020-02-09 RX ADMIN — ATORVASTATIN CALCIUM 20 MG: 10 TABLET, FILM COATED ORAL at 20:49

## 2020-02-09 RX ADMIN — CEFTRIAXONE 1 G: 1 INJECTION, POWDER, FOR SOLUTION INTRAMUSCULAR; INTRAVENOUS at 16:06

## 2020-02-09 RX ADMIN — RIFAXIMIN 550 MG: 550 TABLET ORAL at 20:50

## 2020-02-09 RX ADMIN — IRON SUCROSE 200 MG: 20 INJECTION, SOLUTION INTRAVENOUS at 08:23

## 2020-02-09 RX ADMIN — INSULIN LISPRO 5 UNITS: 100 INJECTION, SOLUTION INTRAVENOUS; SUBCUTANEOUS at 12:06

## 2020-02-09 RX ADMIN — HYDROCODONE BITARTRATE AND ACETAMINOPHEN 1 TABLET: 7.5; 325 TABLET ORAL at 23:50

## 2020-02-09 RX ADMIN — RIFAXIMIN 550 MG: 550 TABLET ORAL at 16:07

## 2020-02-09 RX ADMIN — INSULIN LISPRO 5 UNITS: 100 INJECTION, SOLUTION INTRAVENOUS; SUBCUTANEOUS at 17:00

## 2020-02-09 RX ADMIN — HYDROCODONE BITARTRATE AND ACETAMINOPHEN 1 TABLET: 7.5; 325 TABLET ORAL at 12:07

## 2020-02-09 RX ADMIN — INSULIN LISPRO 3 UNITS: 100 INJECTION, SOLUTION INTRAVENOUS; SUBCUTANEOUS at 20:49

## 2020-02-09 RX ADMIN — HYDROCODONE BITARTRATE AND ACETAMINOPHEN 1 TABLET: 5; 325 TABLET ORAL at 01:07

## 2020-02-09 RX ADMIN — INSULIN LISPRO 5 UNITS: 100 INJECTION, SOLUTION INTRAVENOUS; SUBCUTANEOUS at 12:07

## 2020-02-09 RX ADMIN — SODIUM CHLORIDE, PRESERVATIVE FREE 10 ML: 5 INJECTION INTRAVENOUS at 08:23

## 2020-02-09 RX ADMIN — AZITHROMYCIN MONOHYDRATE 500 MG: 500 INJECTION, POWDER, LYOPHILIZED, FOR SOLUTION INTRAVENOUS at 16:06

## 2020-02-09 RX ADMIN — SODIUM CHLORIDE, PRESERVATIVE FREE 10 ML: 5 INJECTION INTRAVENOUS at 20:50

## 2020-02-09 RX ADMIN — LIDOCAINE 1 PATCH: 50 PATCH CUTANEOUS at 12:06

## 2020-02-09 RX ADMIN — HYDROCODONE BITARTRATE AND ACETAMINOPHEN 1 TABLET: 7.5; 325 TABLET ORAL at 17:00

## 2020-02-09 NOTE — PROGRESS NOTES
[facility] Medicine Services  INPATIENT PROGRESS NOTE    Patient Name: Ford High  Date of Admission: 2/6/2020  Today's Date: 02/09/20  Length of Stay: 3  Primary Care Physician: Denys Yanez Jr., MD    Subjective   Chief Complaint: f/u  HPI   Pulmonary signed off yesterday.  Their recommendation (Dr. Mckenna)   I do not think there is any need for a short-term chest CT as his right basilar infiltrate is well seen on his abdominal CT.  I would recommend a follow-up CT which could be done as a standard chest CT in a few weeks per his family physician and if the infiltrate improves he can just be followed with serial imaging studies.  If it does not then his family physician could refer him to radiology for consideration of a needle biopsy.  Otherwise pulmonary will sign off.    PT/OT note recommending SNF per review of SW    Still complains of pain on right side of his chest and right upper flank, he rates it as 6-7 out of 10 and whenever he moves it hurts more.  He felt Lidoderm had not helped.    He said he has not gotten up out of bed, he feels dizzy.  He expressed concern that his blood pressure bottoms out on him.      Noted temperature 100.9, cultures remain no growth.  White count is slowly improving  Review of Systems     All pertinent negatives and positives are as above. All other systems have been reviewed and are negative unless otherwise stated.     Objective    Temp:  [97.5 °F (36.4 °C)-100.9 °F (38.3 °C)] 98.9 °F (37.2 °C)  Heart Rate:  [] 90  Resp:  [16] 16  BP: (114-137)/(60-81) 127/78  Physical Exam  He is simple minded  Constitutional: He is oriented to person, place, and time. He appears well-developed.   Cachectic, looks older than stated age   HENT:   Head: Normocephalic and atraumatic.   Poor dentition   Eyes: Pupils are equal, round. EOM are normal.   Neck: Normal range of motion. No JVD present. No tracheal deviation present.      Cardiovascular: Normal rate, regular rhythm and normal heart sounds.   No murmur heard.  Pulmonary/Chest: Effort normal. No respiratory distress.lungs sounds are clear  Tender to touch on right flank.    Abdominal: Soft. Bowel sounds are normal. He exhibits no distension. There is no tenderness.   Musculoskeletal: Normal range of motion. He exhibits no edema.   Neurological: He is alert and oriented to person, place, and time.   Skin: Skin is warm and dry. There is pallor.   Psychiatric: He has a normal mood and affect. His behavior is normal.   No significant change from exam yesterday         Results Review:  I have reviewed the labs, radiology results, and diagnostic studies.    Laboratory Data:   Results from last 7 days   Lab Units 02/08/20  0519 02/07/20 0447 02/06/20  1348   WBC 10*3/mm3 14.06* 16.07* 12.48*   HEMOGLOBIN g/dL 7.8* 9.1* 9.3*   HEMATOCRIT % 24.1* 27.6* 27.9*   PLATELETS 10*3/mm3 387 368 388        Results from last 7 days   Lab Units 02/07/20  0447 02/06/20  1348   SODIUM mmol/L 141 135*   POTASSIUM mmol/L 4.3 4.2   CHLORIDE mmol/L 102 97*   CO2 mmol/L 29.0 30.0*   BUN mg/dL 10 11   CREATININE mg/dL 0.52* 0.58*   CALCIUM mg/dL 8.5* 8.6   BILIRUBIN mg/dL  --  0.3   ALK PHOS U/L  --  104   ALT (SGPT) U/L  --  11   AST (SGOT) U/L  --  12   GLUCOSE mg/dL 285* 265*       Culture Data:   Blood Culture   Date Value Ref Range Status   02/06/2020 No growth at 2 days  Preliminary   02/06/2020 No growth at 2 days  Preliminary       Radiology Data:   Imaging Results (Last 24 Hours)     ** No results found for the last 24 hours. **          I have reviewed the patient's current medications.     Assessment/Plan     Active Hospital Problems    Diagnosis   • Pneumonia of right lower lobe due to infectious organism (CMS/HCC)   • Type 2 diabetes mellitus with hyperglycemia, with long-term current use of insulin (CMS/HCC)   • Leukocytosis   • Normocytic anemia   • Right-sided chest wall pain   In addition to  above  Hx of ?IBS constipation/diarrhea  Weight loss; suspected malignancy of lung on top of poorly controlled DM  Hyperglycemia/Glucosuria/poorly controlled DM - Aic 15%  Iron def anemia - ? Malnutrition vs chronic disease of both   Elevated ferritin - ? Acute phase   Fever      · Cont empiric abxs; clinically he looks stable, if persistent the having fever on current antibiotics and increased white count, will consider changing antibiotics  · Legionella, strep pneumoniae antigen and influenza a and B are negative  · Blood culture remains no growth to date  ·  to assist in disposition needs  · Continue PT OT  · Accu-Chek were 166, 217, 254 and 170, continue on pre-meal insulin, sliding scale insulin and Levemir  · Day 3 of 3 of Venofer for iron deficiency anemia; repeat CBC tomorrow  · We will check orthostatic blood pressure      Discharge Planning:?  Tay London MD   02/09/20   8:01 AM

## 2020-02-09 NOTE — PLAN OF CARE
Problem: Patient Care Overview  Goal: Plan of Care Review  Outcome: Ongoing (interventions implemented as appropriate)  Flowsheets (Taken 2/9/2020 8076)  Progress: improving  Plan of Care Reviewed With: patient  Outcome Summary: VSS. Temp elevated at times. Continues to c/o pain to shoulders and back. Pain meds given as ordered. No s/s respiratory distress this shift. Pt. wears 2L NC prn. Family here to visit this shift. Safety maintained. Call light within reach. Will continue to monitor.

## 2020-02-10 LAB
BASOPHILS # BLD AUTO: 0.05 10*3/MM3 (ref 0–0.2)
BASOPHILS NFR BLD AUTO: 0.4 % (ref 0–1.5)
DEPRECATED RDW RBC AUTO: 41.4 FL (ref 37–54)
EOSINOPHIL # BLD AUTO: 0.12 10*3/MM3 (ref 0–0.4)
EOSINOPHIL NFR BLD AUTO: 1 % (ref 0.3–6.2)
ERYTHROCYTE [DISTWIDTH] IN BLOOD BY AUTOMATED COUNT: 12.5 % (ref 12.3–15.4)
GLUCOSE BLDC GLUCOMTR-MCNC: 123 MG/DL (ref 70–130)
GLUCOSE BLDC GLUCOMTR-MCNC: 149 MG/DL (ref 70–130)
GLUCOSE BLDC GLUCOMTR-MCNC: 196 MG/DL (ref 70–130)
GLUCOSE BLDC GLUCOMTR-MCNC: 315 MG/DL (ref 70–130)
HCT VFR BLD AUTO: 30.4 % (ref 37.5–51)
HGB BLD-MCNC: 9.7 G/DL (ref 13–17.7)
IMM GRANULOCYTES # BLD AUTO: 0.06 10*3/MM3 (ref 0–0.05)
IMM GRANULOCYTES NFR BLD AUTO: 0.5 % (ref 0–0.5)
LYMPHOCYTES # BLD AUTO: 1.61 10*3/MM3 (ref 0.7–3.1)
LYMPHOCYTES NFR BLD AUTO: 14.1 % (ref 19.6–45.3)
MCH RBC QN AUTO: 29 PG (ref 26.6–33)
MCHC RBC AUTO-ENTMCNC: 31.9 G/DL (ref 31.5–35.7)
MCV RBC AUTO: 90.7 FL (ref 79–97)
MONOCYTES # BLD AUTO: 1.14 10*3/MM3 (ref 0.1–0.9)
MONOCYTES NFR BLD AUTO: 10 % (ref 5–12)
NEUTROPHILS # BLD AUTO: 8.47 10*3/MM3 (ref 1.7–7)
NEUTROPHILS NFR BLD AUTO: 74 % (ref 42.7–76)
NRBC BLD AUTO-RTO: 0 /100 WBC (ref 0–0.2)
PLATELET # BLD AUTO: 507 10*3/MM3 (ref 140–450)
PMV BLD AUTO: 8.7 FL (ref 6–12)
RBC # BLD AUTO: 3.35 10*6/MM3 (ref 4.14–5.8)
WBC NRBC COR # BLD: 11.45 10*3/MM3 (ref 3.4–10.8)

## 2020-02-10 PROCEDURE — 85025 COMPLETE CBC W/AUTO DIFF WBC: CPT | Performed by: INTERNAL MEDICINE

## 2020-02-10 PROCEDURE — 25010000002 CEFTRIAXONE PER 250 MG: Performed by: NURSE PRACTITIONER

## 2020-02-10 PROCEDURE — 63710000001 INSULIN LISPRO (HUMAN) PER 5 UNITS: Performed by: INTERNAL MEDICINE

## 2020-02-10 PROCEDURE — 82962 GLUCOSE BLOOD TEST: CPT

## 2020-02-10 PROCEDURE — 94799 UNLISTED PULMONARY SVC/PX: CPT

## 2020-02-10 PROCEDURE — 63710000001 INSULIN LISPRO (HUMAN) PER 5 UNITS: Performed by: NURSE PRACTITIONER

## 2020-02-10 PROCEDURE — 97110 THERAPEUTIC EXERCISES: CPT

## 2020-02-10 PROCEDURE — 25010000002 AZITHROMYCIN PER 500 MG: Performed by: NURSE PRACTITIONER

## 2020-02-10 PROCEDURE — 94664 DEMO&/EVAL PT USE INHALER: CPT

## 2020-02-10 PROCEDURE — 63710000001 INSULIN DETEMIR PER 5 UNITS: Performed by: INTERNAL MEDICINE

## 2020-02-10 PROCEDURE — 97162 PT EVAL MOD COMPLEX 30 MIN: CPT

## 2020-02-10 RX ADMIN — RIFAXIMIN 550 MG: 550 TABLET ORAL at 13:24

## 2020-02-10 RX ADMIN — SODIUM CHLORIDE, PRESERVATIVE FREE 10 ML: 5 INJECTION INTRAVENOUS at 21:26

## 2020-02-10 RX ADMIN — ATORVASTATIN CALCIUM 20 MG: 10 TABLET, FILM COATED ORAL at 21:25

## 2020-02-10 RX ADMIN — INSULIN LISPRO 10 UNITS: 100 INJECTION, SOLUTION INTRAVENOUS; SUBCUTANEOUS at 17:07

## 2020-02-10 RX ADMIN — DIPHENOXYLATE HYDROCHLORIDE AND ATROPINE SULFATE 1 TABLET: 2.5; .025 TABLET ORAL at 08:16

## 2020-02-10 RX ADMIN — HYDROCODONE BITARTRATE AND ACETAMINOPHEN 1 TABLET: 7.5; 325 TABLET ORAL at 21:40

## 2020-02-10 RX ADMIN — HYDROCODONE BITARTRATE AND ACETAMINOPHEN 1 TABLET: 7.5; 325 TABLET ORAL at 05:44

## 2020-02-10 RX ADMIN — INSULIN LISPRO 5 UNITS: 100 INJECTION, SOLUTION INTRAVENOUS; SUBCUTANEOUS at 17:06

## 2020-02-10 RX ADMIN — INSULIN LISPRO 3 UNITS: 100 INJECTION, SOLUTION INTRAVENOUS; SUBCUTANEOUS at 21:24

## 2020-02-10 RX ADMIN — AZITHROMYCIN MONOHYDRATE 500 MG: 500 INJECTION, POWDER, LYOPHILIZED, FOR SOLUTION INTRAVENOUS at 15:17

## 2020-02-10 RX ADMIN — INSULIN LISPRO 5 UNITS: 100 INJECTION, SOLUTION INTRAVENOUS; SUBCUTANEOUS at 12:06

## 2020-02-10 RX ADMIN — INSULIN DETEMIR 25 UNITS: 100 INJECTION, SOLUTION SUBCUTANEOUS at 21:25

## 2020-02-10 RX ADMIN — LIDOCAINE 1 PATCH: 50 PATCH CUTANEOUS at 08:16

## 2020-02-10 RX ADMIN — RIFAXIMIN 550 MG: 550 TABLET ORAL at 21:26

## 2020-02-10 RX ADMIN — CEFTRIAXONE 1 G: 1 INJECTION, POWDER, FOR SOLUTION INTRAMUSCULAR; INTRAVENOUS at 14:12

## 2020-02-10 RX ADMIN — HYDROCODONE BITARTRATE AND ACETAMINOPHEN 1 TABLET: 7.5; 325 TABLET ORAL at 13:24

## 2020-02-10 RX ADMIN — INSULIN LISPRO 5 UNITS: 100 INJECTION, SOLUTION INTRAVENOUS; SUBCUTANEOUS at 08:16

## 2020-02-10 RX ADMIN — RIFAXIMIN 550 MG: 550 TABLET ORAL at 05:44

## 2020-02-10 RX ADMIN — SODIUM CHLORIDE, PRESERVATIVE FREE 10 ML: 5 INJECTION INTRAVENOUS at 08:16

## 2020-02-10 NOTE — PLAN OF CARE
Problem: Patient Care Overview  Goal: Plan of Care Review  Outcome: Ongoing (interventions implemented as appropriate)  Flowsheets (Taken 2/10/2020 1528)  Progress: no change  Plan of Care Reviewed With: patient  Note:   Pt. In a.m. Stated that he wanted a shower, this marin/l set-up pt. With towels and washcloths/soap, when time came for shower pt. Changed his mind and wanted to skip shower because he had too much going at the time, ue exs with 1 set x 3 reps before he c/o's of pain in stomach and quit!

## 2020-02-10 NOTE — PLAN OF CARE
Problem: Patient Care Overview  Goal: Plan of Care Review  Outcome: Ongoing (interventions implemented as appropriate)  Flowsheets (Taken 2/10/2020 0777)  Progress: no change  Plan of Care Reviewed With: patient  Outcome Summary: Pt c/o generalized pain. Medicated with PRN meds with some relief. VSS. 02 stable on 2L. Encouraged activity with no acceptance. Pt refused to set in chair for meals. IV abx continue. Bowel movement today. Safety maintained. Will continue to monitor.

## 2020-02-10 NOTE — CONSULTS
"Pt awake and alert.  Admitted with A1C of 15.0%.  Attempted to talk with pt this morning.  He states his A1C is high because he has not worked for \"a week\" and does not have money to get his meds filled.  Spoke with  for discharge needs. Pt has been worried about weight loss and blames his IBD but also, will not gain weight with A1C of 15.0%.  He tells me that he will not go to [facility].  He tells me he has been walking to the bathroom.  He was getting agitated and di not want to talk to me.  Dietitian has tried also.  Time left for questions, there were none.  "

## 2020-02-10 NOTE — PROGRESS NOTES
RT Nebulizer Protocol    Assessment tool to be used for patients with existing breathing treatments ordered by hospitalists                                                                  0  1  2  3  4      Respiratory History   No Smoking   x   Smoking History      1 Pack/Day      Pulmonary Disease      Exacerbation        Respiratory Rate   Normal   x   20-25      Dyspneic      Accessory Muscles      Severe Dyspnea        Breath Sounds   Clear   x   Crackles      Crackles/ Rhonchi      Wheezing      Absent/ Severe Wheezing        Chest   X-ray   Clear      1 Lobe Infiltration/ Consolidation/ PE      2 Lobe Same Lung Infiltration/ Consolidation/ PE       2 Lobe Infiltration/ Both Lungs/ Consolidation/ PE      Both Lungs/ More Than 1 Lobe/ Atelectasis/ Consolidation/  PE        Cough   Strong Non- Productive   x   Excessive Secretions/ Strong Cough      Excessive Secretions/ Weak Cough      Thick Bronchial Secretions/ Weak Cough      Thick Bronchial Secretions/ No Cough        Total Patient Score =  0  0-4=Q4 PRN  5-9=TID and Q4 PRN  10-14=QID and Q3 PRN  15-19=Q4 and Q2 PRN  20=Q3 and Q2 PRN    Bronchopulmonary Hygiene (CPT)   Q4 ATC Copious secretions, dyspnea, unable to sleep, mucus plug    QID & Q4 PRN Moderate secretions    TID Small amounts of secretions w/ poor cough and history of secretions    BID Unable to deep breathe and cough spontaneously       Lung Expansion Therapy (PEP)   Q4 & PRN at night Severe atelectasis, poor oxygenation    QID  High risk for persistent atelectasis, existence of same    TID At risk for developing atelectasis    BID Unable to deep breathe and cough spontaneously     Instruct, 1 follow up Patients able to perform well on their own        Continue Q4 PRN nebs and reevaluate in 24 hours.

## 2020-02-10 NOTE — THERAPY EVALUATION
Patient Name: Ford High  : 1965    MRN: [id number]                              Today's Date: 2/10/2020       Admit Date: 2020    Visit Dx:     ICD-10-CM ICD-9-CM   1. Pneumonia of right lower lobe due to infectious organism (CMS/HCC) J18.1 486   2. Decreased activities of daily living (ADL) Z78.9 V49.89   3. Gait abnormality R26.9 781.2     Patient Active Problem List   Diagnosis   • Pneumonia of right lower lobe due to infectious organism (CMS/HCC)   • Type 2 diabetes mellitus with hyperglycemia, with long-term current use of insulin (CMS/HCC)   • Leukocytosis   • Normocytic anemia   • Right-sided chest wall pain     Past Medical History:   Diagnosis Date   • Autonomic neuropathy    • Chronic diarrhea    • Diabetes mellitus (CMS/HCC)    • Diabetic foot ulcer (CMS/HCC)    • Elevated cholesterol    • Lateral epicondylitis, right elbow    • Noncompliance    • Orthostatic hypotension    • Skin infection      Past Surgical History:   Procedure Laterality Date   • TONSILLECTOMY       General Information     Row Name 02/10/20 1529 02/10/20 1323       PT Evaluation Time/Intention    Document Type  evaluation;other (see comments) see MAR; pt admitted w/ pneumonia, DM2, leukocytosis, normocytic anemia, R sided chest pain  -JE  evaluation;other (see comments) see MAR; pt admitted w/ pneumonia, DM2, leukocytosis, normocytic anemia, R sided chest pain  -JE    Mode of Treatment  physical therapy  -JE  physical therapy  -JE    Row Name 02/10/20 1529 02/10/20 1323       General Information    Patient Profile Reviewed?  yes  -JE  yes  -JE    Prior Level of Function  --  independent:;all household mobility;community mobility;home management;work;driving;shopping  -JE    Existing Precautions/Restrictions  fall;oxygen therapy device and L/min  -JE  fall;oxygen therapy device and L/min  -JE    Barriers to Rehab  --  medically complex  -JE    Row Name 02/10/20 2519          Relationship/Environment    Lives With   alone  -     Row Name 02/10/20 1329          Resource/Environmental Concerns    Current Living Arrangements  home/apartment/condo lives at [facility]  -     Row Name 02/10/20 1329          Home Main Entrance    Number of Stairs, Main Entrance  other (see comments);none elevator to move floor to floor  -     Row Name 02/10/20 1329          Stairs Within Home, Primary    Stairs, Within Home, Primary  see above  -     Row Name 02/10/20 1529 02/10/20 1329       Cognitive Assessment/Intervention- PT/OT    Orientation Status (Cognition)  oriented x 4  -  oriented x 4  -    Personal Safety Interventions  --  fall prevention program maintained;muscle strengthening facilitated;supervised activity;gait belt;nonskid shoes/slippers when out of bed  -[facility] Name 02/10/20 1529 02/10/20 1329       Safety Issues, Functional Mobility    Safety Issues Affecting Function (Mobility)  --  friction/shear risk;safety precautions follow-through/compliance;safety precaution awareness  -    Impairments Affecting Function (Mobility)  balance;endurance/activity tolerance;pain;shortness of breath;strength  -  balance;endurance/activity tolerance;pain;shortness of breath;strength  -      User Key  (r) = Recorded By, (t) = Taken By, (c) = Cosigned By    Initials Name Provider Type    Jyoti Steel, PT Physical Therapist        Mobility     Row Name 02/10/20 1529          Bed Mobility Assessment/Treatment    Bed Mobility Assessment/Treatment  supine-sit  -     Supine-Sit [place] (Bed Mobility)  supervision;verbal cues  -     Assistive Device (Bed Mobility)  head of bed elevated  -[facility] Name 02/10/20 1529          Transfer Assessment/Treatment    Comment (Transfers)  request to be allowed to perform tfers and ambulation w/out assist; impulsive at times  -     Row Name 02/10/20 1529          Sit-Stand Transfer    Sit-Stand [place] (Transfers)  stand by assist;verbal cues  -[facility] Name 02/10/20  "1529          Gait/Stairs Assessment/Training    Gait/Stairs Assessment/Training  gait/ambulation independence;distance ambulated;gait pattern;gait deviations  -     [place] Level (Gait)  stand by assist  -     Distance in Feet (Gait)  12 ft   -     Pattern (Gait)  step-through  -     Deviations/Abnormal Patterns (Gait)  base of support, wide;gait speed decreased;stride length decreased  -     Bilateral Gait Deviations  weight shift ability decreased decrease foot clearance, decrease arm swing  -       User Key  (r) = Recorded By, (t) = Taken By, (c) = Cosigned By    Initials Name Provider Type    Jyoti Steel, PT Physical Therapist        Obj/Interventions     Row Name 02/10/20 1529          General ROM    GENERAL ROM COMMENTS  AROM all 4 extremities WFLs  -[facility] Name 02/10/20 1529          MMT (Manual Muscle Testing)    General MMT Comments  grossly 4 to 4+/5 assessed in seated position  -[facility] Name 02/10/20 1529          Static Sitting Balance    Level of [place] (Unsupported Sitting, Static Balance)  independent  -     Sitting Position (Unsupported Sitting, Static Balance)  sitting in chair  -     Comment (Unsupported Sitting, Static Balance)  supervision on side of bed  -[facility] Name 02/10/20 1529          Dynamic Sitting Balance    Level of [place], Reaches Outside Midline (Sitting, Dynamic Balance)  supervision  -     Sitting Position, Reaches Outside Midline (Sitting, Dynamic Balance)  sitting on edge of bed  -[facility] Name 02/10/20 1529          Static Standing Balance    Level of [place] (Supported Standing, Static Balance)  standby assist  -[facility] Name 02/10/20 1529          Sensory Assessment/Intervention    Sensory General Assessment  -- reports \"nerve damage\" agreed to neuropathy  -       User Key  (r) = Recorded By, (t) = Taken By, (c) = Cosigned By    Initials Name Provider Type    Jyoti Steel, PT Physical Therapist    "     Goals/Plan     [facility] Name 02/10/20 1529          Transfer Goal 1 (PT)    Activity/Assistive Device (Transfer Goal 1, PT)  sit-to-stand/stand-to-sit;bed-to-chair/chair-to-bed  -     [place] Level/Cues Needed (Transfer Goal 1, PT)  conditional independence  -JE     Time Frame (Transfer Goal 1, PT)  long term goal (LTG);10 days  -JE     Progress/Outcome (Transfer Goal 1, PT)  goal ongoing  -[facility] Name 02/10/20 1529          Gait Training Goal 1 (PT)    Activity/Assistive Device (Gait Training Goal 1, PT)  gait (walking locomotion);assistive device use;decrease fall risk;improve balance and speed;increase endurance/gait distance;increase energy conservation;cane, straight;walker, rolling  -JE     [place] Level (Gait Training Goal 1, PT)  standby assist  -JE     Distance (Gait Goal 1, PT)  100 ft w/ less than or equal to 1 standing rest  -JE     Time Frame (Gait Training Goal 1, PT)  long term goal (LTG);10 days  -JE     Progress/Outcome (Gait Training Goal 1, PT)  goal ongoing  -JE     Row Name 02/10/20 1529          Patient Education Goal (PT)    Activity (Patient Education Goal, PT)  energy conservation, impulse control to reduce fall risk  -     [place]/Cues/Accuracy (Memory Goal 2, PT)  demonstrates adequately;independent;verbalizes understanding  -JE     Time Frame (Patient Education Goal, PT)  long term goal (LTG);10 days  -JE     Progress/Outcome (Patient Education Goal, PT)  goal ongoing  -       User Key  (r) = Recorded By, (t) = Taken By, (c) = Cosigned By    Initials Name Provider Type    Jyoti Steel, PT Physical Therapist        Clinical Impression     [facility] Name 02/10/20 1529          Pain Assessment    Additional Documentation  Pain Scale: Numbers Pre/Post-Treatment (Group)  -[facility] Name 02/10/20 1529          Pain Scale: Numbers Pre/Post-Treatment    Pain Scale: Numbers, Pretreatment  8/10  -     Pain Scale: Numbers, Post-Treatment  -- no reported change at end of  visit  -JE     Pain Location - Side  Right  -JE     Pain Intervention(s)  Medication (See MAR);Repositioned;Ambulation/increased activity;Rest  -     Row Name 02/10/20 1529          Plan of Care Review    Plan of Care Reviewed With  patient  -JE     Progress  improving  -JE     Outcome Summary  PT eval completed.  Pt agreeable to therapy, however reported he would not be able to go outside his room.  Pt currently O2 dependent and demonstrates decrease time tolerance to standing and out of bed activity.  Vitals assessed during visit:  in sitting /64, HR 98; standing BP 80/47 HR 98; sitting w/ LE exercise /65 HR 78; sitting after moving to chair and sitting briefly /73, HR 98.  Pt demonstrates orthostatic hypotension, however pt never reported symptoms that related.  Pt demonstrates generalized weakness and decrease balance w/ increase fall risk.  Pt will benefit from continued PT services to improve strength, activity tolerance, balance, safety awareness and I w/ mobility reducing fall risk.  Pt would benefit from use of rwx or cane to improve his stability.  Discussed discharge plans.  Feel pt would benefit from continued skilled care in subacute care prior to returning home, however pt is determined to return home at discharge.  If this is the plan, pt would benefit from  if he would agree.  -     Row Name 02/10/20 1529          Physical Therapy Clinical Impression    Patient/Family Goals Statement (PT Clinical Impression)  regain I  -JE     Criteria for Skilled Interventions Met (PT Clinical Impression)  treatment indicated;yes  -JE     Rehab Potential (PT Clinical Summary)  fair, will monitor progress closely  -JE     Predicted Duration of Therapy (PT)  until discharge or goals achieved  -     Row Name 02/10/20 1529          Vital Signs    Pre Systolic BP Rehab  116  -JE     Pre Treatment Diastolic BP  64  -JE     Intra Systolic BP Rehab  80 after returning to sitting w/ leg ex 104/65   -JE     Intra Treatment Diastolic BP  47  -JE     Post Systolic BP Rehab  108  -JE     Post Treatment Diastolic BP  73  -JE     Pretreatment Heart Rate (beats/min)  98  -JE     Intratreatment Heart Rate (beats/min)  98 upon return to sitting w/ leg ex 78  -JE     Posttreatment Heart Rate (beats/min)  98  -JE     Pre SpO2 (%)  94  -JE     O2 Delivery Pre Treatment  supplemental O2  -JE     O2 Delivery Intra Treatment  supplemental O2  -JE     Post SpO2 (%)  93  -JE     O2 Delivery Post Treatment  supplemental O2  -JE     Pre Patient Position  Sitting  -JE     Intra Patient Position  Standing  -JE     Post Patient Position  Sitting  -JE     Row Name 02/10/20 1529          Positioning and Restraints    Pre-Treatment Position  in bed  -JE     Post Treatment Position  chair  -JE     In Chair  notified nsg;sitting;call light within reach;encouraged to call for assist;exit alarm on  -JE       User Key  (r) = Recorded By, (t) = Taken By, (c) = Cosigned By    Initials Name Provider Type    Jyoti Steel, PT Physical Therapist        Outcome Measures     Row Name 02/10/20 1529          How much help from another person do you currently need...    Turning from your back to your side while in flat bed without using bedrails?  4  -JE     Moving from lying on back to sitting on the side of a flat bed without bedrails?  4  -JE     Moving to and from a bed to a chair (including a wheelchair)?  3  -JE     Standing up from a chair using your arms (e.g., wheelchair, bedside chair)?  3  -JE     Climbing 3-5 steps with a railing?  3  -JE     To walk in hospital room?  3  -JE     AM-PAC 6 Clicks Score (PT)  20  -     Row Name 02/10/20 1529          Functional Assessment    Outcome Measure Options  AM-PAC 6 Clicks Basic Mobility (PT)  -       User Key  (r) = Recorded By, (t) = Taken By, (c) = Cosigned By    Initials Name Provider Type    Jyoti Steel, PT Physical Therapist          PT Recommendation and Plan  Planned  Therapy Interventions (PT Eval): balance training, gait training, home exercise program, patient/family education, strengthening, transfer training, other (see comments)(safety/falls prevention; energy conservation)  Outcome Summary/Treatment Plan (PT)  Anticipated Equipment Needs at Discharge (PT): (pt has wife's equipment to include w/c, BSC, mechanical lift, and hospital bed)  Anticipated Discharge Disposition (PT): other (see comments)(recommend subacute care, however pt plans to return home; would benefit from HH if this is the plan)  Plan of Care Reviewed With: patient  Progress: improving  Outcome Summary: PT eval completed.  Pt agreeable to therapy, however reported he would not be able to go outside his room.  Pt currently O2 dependent and demonstrates decrease time tolerance to standing and out of bed activity.  Vitals assessed during visit:  in sitting /64, HR 98; standing BP 80/47 HR 98; sitting w/ LE exercise /65 HR 78; sitting after moving to chair and sitting briefly /73, HR 98.  Pt demonstrates orthostatic hypotension, however pt never reported symptoms that related.  Pt demonstrates generalized weakness and decrease balance w/ increase fall risk.  Pt will benefit from continued PT services to improve strength, activity tolerance, balance, safety awareness and I w/ mobility reducing fall risk.  Pt would benefit from use of rwx or cane to improve his stability.  Discussed discharge plans.  Feel pt would benefit from continued skilled care in subacute care prior to returning home, however pt is determined to return home at discharge.  If this is the plan, pt would benefit from HH if he would agree.     Time Calculation:   PT Charges     Row Name 02/10/20 8150             Time Calculation    Start Time  1529 5 min chart review  -      Stop Time  1619  -      Time Calculation (min)  50 min  -JE      PT Received On  02/10/20  -      PT Goal Re-Cert Due Date  02/20/20  -         User Key  (r) = Recorded By, (t) = Taken By, (c) = Cosigned By    Initials Name Provider Type    JE Jyoti Lema, PT Physical Therapist        Therapy Charges for Today     Code Description Service Date Service Provider Modifiers Qty    24782376506 HC PT EVAL MOD COMPLEXITY 4 2/10/2020 Jyoti Lema, PT GP 1          PT G-Codes  Outcome Measure Options: AM-PAC 6 Clicks Basic Mobility (PT)  AM-PAC 6 Clicks Score (PT): 20  AM-PAC 6 Clicks Score (OT): 21    Jyoti Lema, PT  2/10/2020

## 2020-02-10 NOTE — PROGRESS NOTES
Continued Stay Note  [facility]     Patient Name: Ford High  MRN: [id number]  Today's Date: 2/10/2020    Admit Date: 2/6/2020    Discharge Plan     Row Name 02/10/20 1028       Plan    Plan  Home    Patient/Family in Agreement with Plan  yes    Plan Comments  Informed pt of bed offer from [facility], he said he NEVER agreed to go there and will not accept this bed.  He says he will return home and will drive home himself.  Informed pt that his meds would be covered if he goes there unlike if he goes home.  He says he does not care, he does not have RX coverage and unable to afford diabetic medications.  He does agree to Meds to Beds, informed [place]. Pt saying he has car in parking lot and will drive himself home at discharge.         Discharge Codes    No documentation.             RENE Castillo

## 2020-02-10 NOTE — DISCHARGE PLACEMENT REQUEST
"[facility]  RUBY GABRIEL RN CM [phone]      Bree High N (54 y.o. Male)     Date of Birth Social Security Number Address Home Phone MRN    1965  [id number]      [facility] [address] [phone] [id number]    Druze Marital Status          Taoism        Admission Date Admission Type Admitting Provider Attending Provider Department, Room/Bed    2/6/20 Emergency Juliano Anaya, Juliano Harrison,  [facility] 4C, 493/1    Discharge Date Discharge Disposition Discharge Destination                       Attending Provider:  Juliano Anaya DO    Allergies:  No Known Allergies    Isolation:  None   Infection:  None   Code Status:  CPR    Ht:  190.5 cm (75\")   Wt:  73.5 kg (162 lb 1.6 oz)    Admission Cmt:  None   Principal Problem:  None                Active Insurance as of 2/6/2020     Primary Coverage     Payor Plan Insurance Group Employer/Plan Group    MEDICARE MEDICARE A & B      Payor Plan Address Payor Plan Phone Number Payor Plan Fax Number Effective Dates    [address] [phone]  3/1/2019 - None Entered    [facility] [id number]       Subscriber Name Subscriber Birth Date Member ID       BREE HIGH 1965 [id number]                 Emergency Contacts      (Rel.) Home Phone Work Phone Mobile Phone    Lily High (Spouse) [phone] -- --               History & Physical      Tay London MD at 02/06/20 1840              [facility] Medicine Services  HISTORY AND PHYSICAL    Date of Admission: 2/6/2020  Primary Care Physician: Denys Yanez Jr., MD    Subjective     Chief Complaint: Right-sided pain    History of Present Illness  Bree High 54-year-old male with a past medical history of medical noncompliance, diabetes mellitus type 2, autonomic neuropathy, chronic diarrhea secondary to IBS, see below for complete list.  Patient " "presented to [facility] emergency department after 3 days illness of subjective fever, cough, right-sided pain scored 8 on a scale of 1-10.  He denies sputum production.  He states he is lost nearly 800 pounds over the past 2 months from IBS.  However, per record review patient only weighed 7 pounds more (162) 11 months ago at this facility.  ER work-up revealed glucose 265, leukocytosis with left shift, anemia, right lower lobe mass with concerns for neoplastic process, 2 cm lytic lesion with sclerotic border right hilum near the SI joint and multifocal pneumonia.  Patient was seen at [facility] 1/24/2020, unable to find ER note however patient states he was having diarrhea and vomiting and despite the fact he \"could not walk\" he was sent home.  It is documented patient does have poor medical compliance.  He reports taking NovoLog 20 units 3 times a day before meals and Levemir 50 units at bedtime.  He is admitted for further evaluation and treatment.    Review of Systems   A 10 point review of systems was completed, all negative except for those discussed in HPI    Past Medical History:   Past Medical History:   Diagnosis Date   • Autonomic neuropathy    • Chronic diarrhea    • Diabetes mellitus (CMS/HCC)    • Diabetic foot ulcer (CMS/HCC)    • Elevated cholesterol    • Lateral epicondylitis, right elbow    • Noncompliance    • Orthostatic hypotension    • Skin infection        Past Surgical History:   Past Surgical History:   Procedure Laterality Date   • TONSILLECTOMY         Family History: family history includes Arthritis in his father and mother; Diabetes in his father and mother; Heart disease in his father; Hyperlipidemia in his mother.    Social History:  reports that he has never smoked. He does not have any smokeless tobacco history on file. He reports that he does not drink alcohol or use drugs.    Code Status: Full, if unable speak for himself his wife will speak for " "him    Allergies:  No Known Allergies    Medications:  NovoLog regular insulin 20 units 3 times a day before meals  Levemir pen 50 units at bedtime    Objective     /66 (BP Location: Right arm, Patient Position: Lying)   Pulse 79   Temp 98.4 °F (36.9 °C) (Oral)   Resp 16   Ht 190.5 cm (75\")   Wt 70.3 kg (155 lb)   SpO2 100%   BMI 19.37 kg/m²    Physical Exam   Constitutional: He is oriented to person, place, and time. He appears well-developed.   Cachectic, looks older than stated age   HENT:   Head: Normocephalic and atraumatic.   Poor dentition   Eyes: Pupils are equal, round, and reactive to light. EOM are normal.   Neck: Normal range of motion. No JVD present. No tracheal deviation present.   Cardiovascular: Normal rate, regular rhythm and normal heart sounds.   No murmur heard.  Pulmonary/Chest: Effort normal. No respiratory distress. He has no wheezes (anterior assessment). He has no rales ( anterior assessment).   Severe pain noted upon deep breath   Abdominal: Soft. Bowel sounds are normal. He exhibits no distension. There is no tenderness.   Musculoskeletal: Normal range of motion. He exhibits no edema.   Neurological: He is alert and oriented to person, place, and time.   Skin: Skin is warm and dry. There is pallor.   Psychiatric: He has a normal mood and affect. His behavior is normal.       Pertinent Data:   Lab Results (last 72 hours)     Procedure Component Value Units Date/Time    Blood Culture - Blood, Wrist, Left [[id number]] Collected:  02/06/20 1348    Specimen:  Blood from Wrist, Left Updated:  02/06/20 1547    Lactic Acid, Plasma [[id number]]  (Normal) Collected:  02/06/20 1405    Specimen:  Blood Updated:  02/06/20 1525     Lactate 0.7 mmol/L     Influenza Antigen, Rapid - Swab, Nasopharynx [[id number]]  (Normal) Collected:  02/06/20 1453    Specimen:  Swab from Nasopharynx Updated:  02/06/20 1520     Influenza A Ag, EIA Negative     Influenza B Ag, EIA Negative    Narrative:       " Recommend confirmation of negative results by viral culture or molecular assay.    Troponin [[id number]]  (Normal) Collected:  02/06/20 1348    Specimen:  Blood Updated:  02/06/20 1520     Troponin T <0.010 ng/mL     Comprehensive Metabolic Panel [[id number]]  (Abnormal) Collected:  02/06/20 1348    Specimen:  Blood Updated:  02/06/20 1438     Glucose 265 mg/dL      BUN 11 mg/dL      Creatinine 0.58 mg/dL      Sodium 135 mmol/L      Potassium 4.2 mmol/L      Chloride 97 mmol/L      CO2 30.0 mmol/L      Calcium 8.6 mg/dL      Total Protein 6.9 g/dL      Albumin 3.20 g/dL      ALT (SGPT) 11 U/L      AST (SGOT) 12 U/L      Alkaline Phosphatase 104 U/L      Total Bilirubin 0.3 mg/dL      eGFR Non African Amer 146 mL/min/1.73      Globulin 3.7 gm/dL      A/G Ratio 0.9 g/dL      BUN/Creatinine Ratio 19.0     Anion Gap 8.0 mmol/L     Lipase [[id number]]  (Abnormal) Collected:  02/06/20 1348    Specimen:  Blood Updated:  02/06/20 1438     Lipase 9 U/L     Amylase [[id number]]  (Abnormal) Collected:  02/06/20 1348    Specimen:  Blood Updated:  02/06/20 1438     Amylase 12 U/L     Protime-INR [[id number]]  (Normal) Collected:  02/06/20 1348    Specimen:  Blood Updated:  02/06/20 1434     Protime 13.8 Seconds      INR 1.03    aPTT [[id number]]  (Abnormal) Collected:  02/06/20 1348    Specimen:  Blood Updated:  02/06/20 1434     PTT 39.4 seconds     CBC Auto Differential [[id number]]  (Abnormal) Collected:  02/06/20 1348    Specimen:  Blood Updated:  02/06/20 1428     WBC 12.48 10*3/mm3      RBC 3.19 10*6/mm3      Hemoglobin 9.3 g/dL      Hematocrit 27.9 %      MCV 87.5 fL      MCH 29.2 pg      MCHC 33.3 g/dL      RDW 12.2 %      RDW-SD 39.6 fl      MPV 8.8 fL      Platelets 388 10*3/mm3      Neutrophil % 82.4 %      Lymphocyte % 7.9 %      Monocyte % 8.2 %      Eosinophil % 0.6 %      Basophil % 0.2 %      Immature Grans % 0.7 %      Neutrophils, Absolute 10.28 10*3/mm3      Lymphocytes, Absolute 0.98 10*3/mm3      Monocytes,  Absolute 1.02 10*3/mm3      Eosinophils, Absolute 0.08 10*3/mm3      Basophils, Absolute 0.03 10*3/mm3      Immature Grans, Absolute 0.09 10*3/mm3      nRBC 0.0 /100 WBC      Imaging Results (Last 24 Hours)     Procedure Component Value Units Date/Time    XR Chest 1 View [[id number]] Collected:  02/06/20 1610     Updated:  02/06/20 1616    Narrative:       XR CHEST 1 VW-     Indication: fever. cough     Comparison: Same day CT     Findings:     Cardiac silhouette is upper limits of normal in size.   No pleural effusion or visible pneumothorax.   Focal airspace opacity in the right lower lobe and left upper lobe.   No suspicious osseous lesion.       Impression:          Multifocal bilateral airspace opacity, suspicious for multifocal  pneumonia. Recommend follow-up imaging to document resolution and  exclude neoplastic process.  This report was finalized on 02/06/2020 16:13 by Dr. Jared Cerrato MD.    CT Abdomen Pelvis Without Contrast [[id number]] Collected:  02/06/20 1555     Updated:  02/06/20 1607    Narrative:       EXAMINATION: CT ABDOMEN PELVIS WO CONTRAST-  2/6/2020 3:55 PM CST     HISTORY: Abdominal pain     COMPARISON:None     TECHNIQUE:  Radiation dose equals  mGy-cm.  Automated exposure  control dose reduction technique was implemented.     Thin section axial imaging was obtained. 2-D sagittal and coronal  reconstruction images were generated.     Intravenous contrast was not administered.     Oral contrast was not ingested.     FINDINGS:     There is imaged part degradation particularly in the lower  abdomen/pelvis due to patient motion.     There is a pleural-based, masslike-infiltrative airspace consolidation  in the right lower lobe posterior medially with focal area of cystic  change centrally within the consolidation. Acute infectious/inflammatory  process considered. Malignancy could also have this appearance.  Correlate with patient presentation.     There is no CT evidence of gallstones.      There is no focal hepatic lesions. There is no biliary ductal  dilatation.     The spleen is nonenlarged.     There are no adrenal masses.     There are no pancreatic lesions.     There are no urinary tract calculi.     There is no pelvocaliectasis or hydroureter. The urinary bladder is  mildly distended. There is no focal bladder wall abnormality. Prostate  gland is not enlarged.     There is moderate amount of stool identified throughout the colon which  is not dilated. The appendix is not inflamed.     The small bowel is not dilated.     The duodenal sweep is imaged appropriately.     The stomach is not distended.     There are no pathologically enlarged lymph nodes.     There is atherosclerotic aorto iliac and femoral calcifications.     There is no ascites or pneumoperitoneum.     No acute osseous abnormalities observed.     There is a 2 cm lytic lesion with sclerotic border identified in the  right ilium near the SI joint, nonaggressive appearing.       Impression:       1. Infiltrative masslike area of airspace consolidation in the right  lower lobe, pleural-based with focal cystic change centrally.  Differential considerations include infectious/inflammatory change,  pneumonia, as well as pulmonary neoplasm/malignancy. Correlate with  patient presentation, follow-up recommended.  2. Atherosclerotic calcifications.  3. No CT evidence of acute intra-abdominal/pelvic pathological process.  This report was finalized on 02/06/2020 16:04 by Dr. Cortes Ruff MD.            I have personally reviewed and interpreted the radiology studies and ECG obtained at time of admission.     Assessment / Plan     Assessment:   Active Hospital Problems    Diagnosis   • Pneumonia of right lower lobe due to infectious organism (CMS/HCC)   • Type 2 diabetes mellitus with hyperglycemia, with long-term current use of insulin (CMS/HCC)   • Leukocytosis   • Normocytic anemia   • Right-sided chest wall pain   Concerns for lung cancer  with metastatic disease due to sclerotic lesion border right ilium near SI joint    Plan:   1.  Admit as inpatient  2.  Consult pulmonology  3.  Home medication reviewed  4.  DVT prophylaxis with SCDs  5.  Continue Rocephin and azithromycin  6.  Incentive spirometry, supplemental O2, continuous pulse oximetry, nebs, ABGs as needed  7.  RT to initiate breathing treatment protocol  8.  Additional labs anemia studies, urine for Legionella and strep pneumoniae, respiratory culture, urinalysis with culture  9.  Labs in a.m.  10.  Normal saline 100 mL/hour  11.  Monitor glucose before meals and at bedtime with regular insulin sliding scale coverage  12.  Pain management  13.  N.p.o. after midnight for possible biopsy in a.m.    I discussed the patient's findings and my recommendations with: Tay London MD  Time spent: 40 minutes    Patient seen and examined by me on 2/6/2020 at 6:50 PM.    DENISHA Jerome  02/06/20   8:13 PM     I personally evaluated and examined the patient in conjunction with  Eliseo DENNY and agree with the assessment, treatment plan, and disposition of the patient as recorded by her. My history, exam, and further recommendations are:     Vitals:    02/06/20 1950   BP: 104/66   Pulse: 79   Resp: 16   Temp: 98.4 °F (36.9 °C)   SpO2: 100%   He's a 54-year-old man who came to the emergency room for evaluation of abdominal  pain. He's admitted with a diagnosis of right lower lobe pneumonia. Review of records indicates that he has been complaining of right sided pain that radiates his back/side after his abdomen worse with movement and coughing. His vital signs are stable but slightly tachycardic.  CXR on my review showed fullness of right hilum. However, CT of  abdomen and pelvis in evaluation for abdominal  pain apparently showed infiltrative like pleural base  consolidation in the right lower lobe. He received .ceftriaxone, azithromycin, dilaudid,  Zofran and Advil +1 L of IV fluid    On  encounter, he said 'I'm hungry  He seems chronically ill.  He has point tenderness on right flank, up to the side of his ribs. His BMi is 19.      Will treat for pna. Consult Pulmonary. May need IR for biopsy.   Agree with above  Tay London MD  02/06/20  10:27 PM          Electronically signed by Tay London MD at 02/06/20 5181

## 2020-02-10 NOTE — PLAN OF CARE
Problem: Patient Care Overview  Goal: Plan of Care Review  Outcome: Ongoing (interventions implemented as appropriate)  Flowsheets (Taken 2/10/2020 0310)  Progress: improving  Plan of Care Reviewed With: patient  Outcome Summary: VSS. C/O right shoulder pain this shift. Pain meds given as ordered. No s/s respiratory distress noted. Encouraged to increase activity and self care encouraged. Pt. states he may be discharged home today. Safety maintained. Call light within reach. Will continue to monitor.

## 2020-02-10 NOTE — PROGRESS NOTES
[facility] Medicine Services  INPATIENT PROGRESS NOTE    Patient Name: Ford High  Date of Admission: 2/6/2020  Today's Date: 02/10/20  Length of Stay: 4  Primary Care Physician: Denys Yanez Jr., MD    Subjective   Chief Complaint: Follow-up pneumonia    HPI   Patient seen and examined.  Overall he is feeling better but still having pleuritic right rib pain with deep inspiration and then when he lays on his right side.  No nausea or vomiting.  Still needing 1-2 L nasal cannula on and off.  Low-grade fever overnight otherwise doing well without any issues.  Patient was to be placed at [facility] but now has declined SNF placement and wants to go home when ready.        Review of Systems   All pertinent negatives and positives are as above. All other systems have been reviewed and are negative unless otherwise stated.     Objective    Temp:  [98.3 °F (36.8 °C)-100.3 °F (37.9 °C)] 98.3 °F (36.8 °C)  Heart Rate:  [] 98  Resp:  [16-18] 18  BP: (127-155)/(71-87) 148/84  Physical Exam  GEN: Awake, alert, interactive, in NAD  HEENT: Atraumatic, PERRLA, EOMI, Anicteric, Trachea midline  Lungs: sounds somewhat diminished at R base otherwise CTAB, no wheezing  Heart: RRR, +S1/s2, no rub  ABD: soft, nt/nd, +BS, no guarding/rebound  Extremities: atraumatic, no cyanosis, no edema  Skin: no rashes or petechiae  Neuro: AAOx3, no focal deficits  Psych: normal mood & affect        Results Review:  I have reviewed the labs, radiology results, and diagnostic studies.    Laboratory Data:   Results from last 7 days   Lab Units 02/10/20  0448 02/08/20  0519 02/07/20  0447   WBC 10*3/mm3 11.45* 14.06* 16.07*   HEMOGLOBIN g/dL 9.7* 7.8* 9.1*   HEMATOCRIT % 30.4* 24.1* 27.6*   PLATELETS 10*3/mm3 507* 387 368        Results from last 7 days   Lab Units 02/07/20  0447 02/06/20  1348   SODIUM mmol/L 141 135*   POTASSIUM mmol/L 4.3 4.2   CHLORIDE mmol/L 102 97*   CO2 mmol/L 29.0 30.0*   BUN  mg/dL 10 11   CREATININE mg/dL 0.52* 0.58*   CALCIUM mg/dL 8.5* 8.6   BILIRUBIN mg/dL  --  0.3   ALK PHOS U/L  --  104   ALT (SGPT) U/L  --  11   AST (SGOT) U/L  --  12   GLUCOSE mg/dL 285* 265*       Culture Data:   Blood Culture   Date Value Ref Range Status   02/06/2020 No growth at 4 days  Preliminary   02/06/2020 No growth at 3 days  Preliminary       Radiology Data:   Imaging Results (Last 24 Hours)     ** No results found for the last 24 hours. **          I have reviewed the patient's current medications.     Assessment/Plan     Active Hospital Problems    Diagnosis   • **Pneumonia of right lower lobe due to infectious organism (CMS/[facility])   • Type 2 diabetes mellitus with hyperglycemia, with long-term current use of insulin (CMS/[facility])   • Leukocytosis   • Normocytic anemia   • Right-sided chest wall pain       #1 right lower lobe pneumonia -dense on imaging.  Has been improving on ceftriaxone and azithromycin.  Still having low-grade fevers but his fever curve is trending down.  Was 100.0 today.  Seen by pulmonary recommend outpatient CT of the chest in follow-up.    #2 leukocytosis -in the setting of pneumonia.  Has resolved.    #3 normocytic anemia -H&H stable.  No signs of active bleeding    #4 uncontrolled diabetes -A1c is 15.  His sugars are doing well here on half of his prescribed home insulin dose.  Suspect noncompliance with diet medication at home.  Continue current insulin and hypoglycemia protocol.    Discharge Planning: Hopeful for change to oral antibiotics and discharge home tomorrow.  May need O2 eval prior to discharge.  Patient was recommended for SNF but is declining.  Will plan for home health services if he will allow.    Juliano Anaya DO   02/10/20   3:45 PM

## 2020-02-10 NOTE — PLAN OF CARE
Problem: Patient Care Overview  Goal: Plan of Care Review  Outcome: Ongoing (interventions implemented as appropriate)  Flowsheets (Taken 2/10/2020 1707)  Progress: improving  Plan of Care Reviewed With: patient  Outcome Summary: PT eval completed.  Pt agreeable to therapy, however reported he would not be able to go outside his room.  Pt currently O2 dependent and demonstrates decrease time tolerance to standing and out of bed activity.  Vitals assessed during visit:  in sitting /64, HR 98; standing BP 80/47 HR 98; sitting w/ LE exercise /65 HR 78; sitting after moving to chair and sitting briefly /73, HR 98.  Pt demonstrates orthostatic hypotension, however pt never reported symptoms that related.  Pt demonstrates generalized weakness and decrease balance w/ increase fall risk.  Pt will benefit from continued PT services to improve strength, activity tolerance, balance, safety awareness and I w/ mobility reducing fall risk.  Pt would benefit from use of rwx or cane to improve his stability.  Discussed discharge plans.  Feel pt would benefit from continued skilled care in subacute care prior to returning home, however pt is determined to return home at discharge.  If this is the plan, pt would benefit from  if he would agree.

## 2020-02-10 NOTE — PLAN OF CARE
Problem: Patient Care Overview  Goal: Plan of Care Review  Flowsheets (Taken 2/9/2020 6048 by Eugenia Mendes, RN)  Progress: improving  Plan of Care Reviewed With: patient  Outcome Summary: VSS. Temp elevated at times. Continues to c/o pain to shoulders and back. Pain meds given as ordered. No s/s respiratory distress this shift. Pt. wears 2L NC prn. Family here to visit this shift. Safety maintained. Call light within reach. Will continue to monitor.  Note:   Pt refuses to get oob to chair for meals. Orthostatics ordered. Pt very reluctant to stand for bp. He only stood with much encouragement and assistance x 2. Pt/ot ordered. Orthostatics were positive. Safety maintained. Will continue to monitor.

## 2020-02-11 VITALS
TEMPERATURE: 98.8 F | SYSTOLIC BLOOD PRESSURE: 144 MMHG | HEIGHT: 75 IN | BODY MASS INDEX: 20.05 KG/M2 | HEART RATE: 97 BPM | OXYGEN SATURATION: 92 % | RESPIRATION RATE: 16 BRPM | WEIGHT: 161.3 LBS | DIASTOLIC BLOOD PRESSURE: 84 MMHG

## 2020-02-11 LAB
BACTERIA SPEC AEROBE CULT: NORMAL
BACTERIA SPEC AEROBE CULT: NORMAL
GLUCOSE BLDC GLUCOMTR-MCNC: 112 MG/DL (ref 70–130)
GLUCOSE BLDC GLUCOMTR-MCNC: 156 MG/DL (ref 70–130)

## 2020-02-11 PROCEDURE — 82962 GLUCOSE BLOOD TEST: CPT

## 2020-02-11 PROCEDURE — 63710000001 INSULIN LISPRO (HUMAN) PER 5 UNITS: Performed by: NURSE PRACTITIONER

## 2020-02-11 PROCEDURE — 63710000001 INSULIN LISPRO (HUMAN) PER 5 UNITS: Performed by: INTERNAL MEDICINE

## 2020-02-11 PROCEDURE — 94618 PULMONARY STRESS TESTING: CPT

## 2020-02-11 PROCEDURE — 97110 THERAPEUTIC EXERCISES: CPT

## 2020-02-11 RX ORDER — CEFDINIR 300 MG/1
300 CAPSULE ORAL 2 TIMES DAILY
Qty: 10 CAPSULE | Refills: 0 | Status: SHIPPED | OUTPATIENT
Start: 2020-02-11 | End: 2020-02-16

## 2020-02-11 RX ORDER — DOXYCYCLINE HYCLATE 100 MG
100 TABLET ORAL 2 TIMES DAILY
Qty: 10 TABLET | Refills: 0 | Status: SHIPPED | OUTPATIENT
Start: 2020-02-11 | End: 2020-02-16

## 2020-02-11 RX ORDER — INSULIN GLARGINE 100 [IU]/ML
25 INJECTION, SOLUTION SUBCUTANEOUS NIGHTLY
Refills: 12
Start: 2020-02-11

## 2020-02-11 RX ADMIN — INSULIN LISPRO 5 UNITS: 100 INJECTION, SOLUTION INTRAVENOUS; SUBCUTANEOUS at 11:59

## 2020-02-11 RX ADMIN — INSULIN LISPRO 5 UNITS: 100 INJECTION, SOLUTION INTRAVENOUS; SUBCUTANEOUS at 08:35

## 2020-02-11 RX ADMIN — DIPHENOXYLATE HYDROCHLORIDE AND ATROPINE SULFATE 1 TABLET: 2.5; .025 TABLET ORAL at 08:34

## 2020-02-11 RX ADMIN — INSULIN LISPRO 2 UNITS: 100 INJECTION, SOLUTION INTRAVENOUS; SUBCUTANEOUS at 11:59

## 2020-02-11 RX ADMIN — RIFAXIMIN 550 MG: 550 TABLET ORAL at 05:42

## 2020-02-11 RX ADMIN — HYDROCODONE BITARTRATE AND ACETAMINOPHEN 1 TABLET: 7.5; 325 TABLET ORAL at 05:42

## 2020-02-11 RX ADMIN — SODIUM CHLORIDE, PRESERVATIVE FREE 10 ML: 5 INJECTION INTRAVENOUS at 08:35

## 2020-02-11 RX ADMIN — LIDOCAINE 1 PATCH: 50 PATCH CUTANEOUS at 08:35

## 2020-02-11 NOTE — PLAN OF CARE
Problem: Patient Care Overview  Goal: Plan of Care Review  Outcome: Ongoing (interventions implemented as appropriate)  Flowsheets (Taken 2/11/2020 1152)  Progress: improving  Plan of Care Reviewed With: patient  Note:   Pt. Fowlers in bed, this marin/l asked pt. If he would like to exercise some? Pt. Started cursing at this marin/l and about everyone on the floor about leaving him the ---- alone and let him sleep! This marin/l went to take bar and I explained that he didn't have to do anything he didn't want to do, pt. Grabbed bar and performed 10 reps handed  to this marin/l and rolled over on his side!

## 2020-02-11 NOTE — THERAPY DISCHARGE NOTE
Acute Care - Physical Therapy Discharge Summary  [facility]       Patient Name: Ford High  : 1965  MRN: [id number]    Today's Date: 2020  Onset of Illness/Injury or Date of Surgery: 20       Referring Physician: DENISHA Bassett      Admit Date: 2020      PT Recommendation and Plan    Visit Dx:    ICD-10-CM ICD-9-CM   1. Pneumonia of right lower lobe due to infectious organism (CMS/[facility]) J18.1 486   2. Decreased activities of daily living (ADL) Z78.9 V49.89   3. Gait abnormality R26.9 781.2       Outcome Measures     Row Name 20 1130 02/10/20 1420          How much help from another is currently needed...    Putting on and taking off regular lower body clothing?  3  -CJ  3  -CJ     Bathing (including washing, rinsing, and drying)  3  -CJ  3  -CJ     Toileting (which includes using toilet bed pan or urinal)  3  -CJ  3  -CJ     Putting on and taking off regular upper body clothing  4  -CJ  4  -CJ     Taking care of personal grooming (such as brushing teeth)  4  -CJ  4  -CJ     Eating meals  4  -CJ  4  -CJ     AM-PAC 6 Clicks Score (OT)  21  -CJ  21  -CJ        Functional Assessment    Outcome Measure Options  AM-PAC 6 Clicks Daily Activity (OT)  -CJ  AM-PAC 6 Clicks Daily Activity (OT)  -CJ       User Key  (r) = Recorded By, (t) = Taken By, (c) = Cosigned By    Initials Name Provider Type    CJ Celso Ruff COTA/L Occupational Therapy Assistant              Rehab Goal Summary     Row Name 20 1500             Transfer Goal 1 (PT)    Activity/Assistive Device (Transfer Goal 1, PT)  sit-to-stand/stand-to-sit;bed-to-chair/chair-to-bed  -KJ      [place] Level/Cues Needed (Transfer Goal 1, PT)  conditional independence  -KJ      Time Frame (Transfer Goal 1, PT)  long term goal (LTG);10 days  -KJ      Progress/Outcome (Transfer Goal 1, PT)  goal not met  -KJ         Gait Training Goal 1 (PT)    Activity/Assistive Device (Gait Training Goal 1, PT)  gait (walking  locomotion);assistive device use;decrease fall risk;improve balance and speed;increase endurance/gait distance;increase energy conservation;cane, straight;walker, rolling  -KJ      [place] Level (Gait Training Goal 1, PT)  standby assist  -KJ      Distance (Gait Goal 1, PT)  100 ft w/ less than or equal to 1 standing rest  -KJ      Time Frame (Gait Training Goal 1, PT)  long term goal (LTG);10 days  -KJ      Progress/Outcome (Gait Training Goal 1, PT)  goal not met  -KJ         Patient Education Goal (PT)    Activity (Patient Education Goal, PT)  energy conservation, impulse control to reduce fall risk  -KJ      [place]/Cues/Accuracy (Memory Goal 2, PT)  demonstrates adequately;independent;verbalizes understanding  -KJ      Time Frame (Patient Education Goal, PT)  long term goal (LTG);10 days  -KJ      Progress/Outcome (Patient Education Goal, PT)  goal not met  -KJ        User Key  (r) = Recorded By, (t) = Taken By, (c) = Cosigned By    Initials Name Provider Type Discipline    Thalia Bentley, PTA Physical Therapy Assistant PT              PT Discharge Summary  Anticipated Discharge Disposition (PT): home  Reason for Discharge: Discharge from facility  Outcomes Achieved: Refer to plan of care for updates on goals achieved  Discharge Destination: Home      Thalia Owen PTA   2/11/2020

## 2020-02-11 NOTE — PROGRESS NOTES
RT Nebulizer Protocol    Assessment tool to be used for patients with existing breathing treatments ordered by hospitalists                                                                  0  1  2  3  4      Respiratory History   No Smoking   x   Smoking History      1 Pack/Day      Pulmonary Disease      Exacerbation        Respiratory Rate   Normal   x   20-25      Dyspneic      Accessory Muscles      Severe Dyspnea        Breath Sounds   Clear   x   Crackles      Crackles/ Rhonchi      Wheezing      Absent/ Severe Wheezing        Chest   X-ray   Clear      1 Lobe Infiltration/ Consolidation/ PE      2 Lobe Same Lung Infiltration/ Consolidation/ PE       2 Lobe Infiltration/ Both Lungs/ Consolidation/ PE      Both Lungs/ More Than 1 Lobe/ Atelectasis/ Consolidation/  PE        Cough   Strong Non- Productive   x   Excessive Secretions/ Strong Cough      Excessive Secretions/ Weak Cough      Thick Bronchial Secretions/ Weak Cough      Thick Bronchial Secretions/ No Cough        Total Patient Score =  0  0-4=Q4 PRN  5-9=TID and Q4 PRN  10-14=QID and Q3 PRN  15-19=Q4 and Q2 PRN  20=Q3 and Q2 PRN    Bronchopulmonary Hygiene (CPT)   Q4 ATC Copious secretions, dyspnea, unable to sleep, mucus plug    QID & Q4 PRN Moderate secretions    TID Small amounts of secretions w/ poor cough and history of secretions    BID Unable to deep breathe and cough spontaneously       Lung Expansion Therapy (PEP)   Q4 & PRN at night Severe atelectasis, poor oxygenation    QID  High risk for persistent atelectasis, existence of same    TID At risk for developing atelectasis    BID Unable to deep breathe and cough spontaneously     Instruct, 1 follow up Patients able to perform well on their own        Continue Q4 PRN per protocol and reevaluate in 24 hours.

## 2020-02-11 NOTE — SIGNIFICANT NOTE
Patient has been discharged.Patient requesting pain medication and just wants to sleep.Will not administer any pain medication prior his discharge due to patient stating he will drive himself home.

## 2020-02-11 NOTE — PROGRESS NOTES
Exercise Oximetry    Patient Name:Ford High   MRN: [id number]   Date: 02/11/20             ROOM AIR BASELINE   SpO2% 78   Heart Rate    Blood Pressure      EXERCISE ON ROOM AIR SpO2% EXERCISE ON O2 @3  LPM SpO2%   1 MINUTE  1 MINUTE 90   2 MINUTES  2 MINUTES 90   3 MINUTES  3 MINUTES 91   4 MINUTES  4 MINUTES    5 MINUTES  5 MINUTES    6 MINUTES  6 MINUTES               Distance Walked   Distance Walked   Dyspnea (Meg Scale)   Dyspnea (Meg Scale)   Fatigue (Meg Scale)   Fatigue (Meg Scale)   SpO2% Post Exercise   SpO2% Post Exercise   HR Post Exercise   HR Post Exercise   Time to Recovery   Time to Recovery     Comments: Patient refused to ambulate in the flores. Walked with o2@3lpm about 20' in his room .

## 2020-02-11 NOTE — DISCHARGE SUMMARY
[facility] Medicine Services  DISCHARGE SUMMARY       Date of Admission: 2/6/2020  Date of Discharge:  2/11/2020  Primary Care Physician: Denys Yanez Jr., MD    Presenting Problem/History of Present Illness:  Pneumonia of right lower lobe due to infectious organism (CMS/HCC) [J18.1]     Final Discharge Diagnoses:  Active Hospital Problems    Diagnosis   • **Pneumonia of right lower lobe due to infectious organism (CMS/HCC)   • Type 2 diabetes mellitus with hyperglycemia, with long-term current use of insulin (CMS/HCC)   • Leukocytosis   • Normocytic anemia   • Right-sided chest wall pain       Consults:   #1 Dr. Mckenna, pulmonology    Procedures Performed: none    Pertinent Test Results:   Procedure Component Value Units Date/Time   XR Chest 1 View [[id number]] Hector as Reviewed   Order Status: Completed Collected: 02/06/20 1610    Updated: 02/06/20 1616   Narrative:     XR CHEST 1 VW-     Indication: fever. cough     Comparison: Same day CT     Findings:     Cardiac silhouette is upper limits of normal in size.   No pleural effusion or visible pneumothorax.   Focal airspace opacity in the right lower lobe and left upper lobe.   No suspicious osseous lesion.      Impression:        Multifocal bilateral airspace opacity, suspicious for multifocal  pneumonia. Recommend follow-up imaging to document resolution and  exclude neoplastic process.  This report was finalized on 02/06/2020 16:13 by Dr. Jared Cerrato MD.   CT Abdomen Pelvis Without Contrast [[id number]] Hector as Reviewed   Order Status: Completed Collected: 02/06/20 1555    Updated: 02/06/20 1607   Narrative:     EXAMINATION: CT ABDOMEN PELVIS WO CONTRAST-  2/6/2020 3:55 PM CST     HISTORY: Abdominal pain     COMPARISON:None     TECHNIQUE:  Radiation dose equals  mGy-cm.  Automated exposure  control dose reduction technique was implemented.     Thin section axial imaging was obtained. 2-D sagittal and  coronal  reconstruction images were generated.     Intravenous contrast was not administered.     Oral contrast was not ingested.     FINDINGS:     There is imaged part degradation particularly in the lower  abdomen/pelvis due to patient motion.     There is a pleural-based, masslike-infiltrative airspace consolidation  in the right lower lobe posterior medially with focal area of cystic  change centrally within the consolidation. Acute infectious/inflammatory  process considered. Malignancy could also have this appearance.  Correlate with patient presentation.     There is no CT evidence of gallstones.     There is no focal hepatic lesions. There is no biliary ductal  dilatation.     The spleen is nonenlarged.     There are no adrenal masses.     There are no pancreatic lesions.     There are no urinary tract calculi.     There is no pelvocaliectasis or hydroureter. The urinary bladder is  mildly distended. There is no focal bladder wall abnormality. Prostate  gland is not enlarged.     There is moderate amount of stool identified throughout the colon which  is not dilated. The appendix is not inflamed.     The small bowel is not dilated.     The duodenal sweep is imaged appropriately.     The stomach is not distended.     There are no pathologically enlarged lymph nodes.     There is atherosclerotic aorto iliac and femoral calcifications.     There is no ascites or pneumoperitoneum.     No acute osseous abnormalities observed.     There is a 2 cm lytic lesion with sclerotic border identified in the  right ilium near the SI joint, nonaggressive appearing.      Impression:     1. Infiltrative masslike area of airspace consolidation in the right  lower lobe, pleural-based with focal cystic change centrally.  Differential considerations include infectious/inflammatory change,  pneumonia, as well as pulmonary neoplasm/malignancy. Correlate with  patient presentation, follow-up recommended.  2. Atherosclerotic  calcifications.  3. No CT evidence of acute intra-abdominal/pelvic pathological process.  This report was finalized on 02/06/2020 16:04 by Dr. Cortes Ruff MD.         Chief Complaint on Day of Discharge: f/u sob/pna    History of Present Illness on Day of Discharge:   Patient seen and examined.  Cough has improved.  Overall doing better.  No chest pain today.  No nausea or vomiting.  Initially offered discharge to skilled nursing facility but declines and wants to go home.  Will discharge today with home health.    Hospital Course:  The patient is a 54 y.o. male who presented to [facility] on 2/6 with right sided chest/rib pain.  He is a noncompliant patient.  Type 2 diabetes.  Autonomic neuropathy.  He had 3 days of fever, cough, right-sided chest pain.  States he had lost some weight recently but because of his chronic diarrhea and IBS.  By our records he was 7 pounds heavier than he was 11 months ago at our facility.  In the ER patient was found to be hyperglycemic with a chest x-ray showing right lower lobe pneumonia.  Patient also had a CT of his abdomen in the ER which was nonacute for abdominal issues but again showed a masslike infiltration in the right lower lung.  Patient was admitted to the hospital on ceftriaxone and azithromycin with pulmonary consultation.  Pulmonary stated no need for inpatient CAT scan at this time due to underlying current illness and imaging.  Recommend follow-up CT of his chest in 2 to 4 weeks to look for resolution of pneumonia and/or any residual potential malignancy.  He otherwise has improved.  He is weaned down to 1 L of oxygen and not always needing it.  He remains somewhat weak and we recommended SNF discharge patient declines.  He is accepting of home health services.  At this time we will discharge him home with home health therapy to continue PT, OT, home safety eval.  We will continue Omnicef and doxycycline for 5 more days to complete 10-day course of  "antibiotics.  Of note his A1c was 15.  His home med list shows 20 units of short acting insulin with meals and 50 of Lantus.  Here he was on 5 of short acting 25 Levemir and his sugars have been well controlled.  Again patient appears to be noncompliant at baseline and suspect he is either not following his diet or not taking his insulin.  Needs follow-up with PCP after discharge.  Medicines adjusted for discharge.  See below.    Condition on Discharge: Stable/improved    Physical Exam on Discharge:  /84 (Patient Position: Lying)   Pulse 97   Temp 98.8 °F (37.1 °C) (Oral)   Resp 16   Ht 190.5 cm (75\")   Wt 73.2 kg (161 lb 4.8 oz)   SpO2 92%   BMI 20.16 kg/m²   Physical Exam  GEN: Awake, alert, interactive, in NAD  HEENT: Atraumatic, PERRLA, EOMI, Anicteric, Trachea midline  Lungs:  CTAB, no wheezing  Heart: RRR, +S1/s2, no rub  ABD: soft, nt/nd, +BS, no guarding/rebound  Extremities: atraumatic, no cyanosis, no edema  Skin: no rashes or petechiae  Neuro: AAOx3, no focal deficits  Psych: normal mood & affect    Discharge Disposition:  Home-Health Care [facility]    Discharge Medications:     Discharge Medications      New Medications      Instructions Start Date   cefdinir 300 MG capsule  Commonly known as:  OMNICEF   300 mg, Oral, 2 Times Daily      doxycycline 100 MG tablet  Commonly known as:  VIBRAMYICN   100 mg, Oral, 2 Times Daily         Changes to Medications      Instructions Start Date   insulin aspart 100 UNIT/ML solution pen-injector sc pen  Commonly known as:  NOVOLOG FLEXPEN  What changed:  how much to take   5 Units, Subcutaneous, 3 Times Daily With Meals      insulin glargine 100 UNIT/ML injection  Commonly known as:  LANTUS  What changed:  how much to take   25 Units, Subcutaneous, Nightly         Continue These Medications      Instructions Start Date   atorvastatin 20 MG tablet  Commonly known as:  LIPITOR   20 mg, Oral, Daily      dicyclomine 20 MG tablet  Commonly known as:  BENTYL   20 mg, " Oral, 4 Times Daily PRN      diphenoxylate-atropine 2.5-0.025 MG per tablet  Commonly known as:  LOMOTIL   1-2 tablets, Oral, 3 Times Daily PRN      midodrine 10 MG tablet  Commonly known as:  PROAMATINE   10 mg, Oral, 2 Times Daily      riFAXIMin 550 MG tablet  Commonly known as:  XIFAXAN   550 mg, Oral, Every 8 Hours Scheduled             Discharge Diet: Carb consistent    Activity at Discharge: As tolerated    Discharge Care Plan/Instructions: Follow-up with your primary care provider.  You need a CT of your chest in 2 to 4 weeks to follow-up for resolution of pneumonia and rule out any underlying malignancy.    Follow-up Appointments:   No future appointments.    Test Results Pending at Discharge: None    Juliano Anaya DO  02/11/20  11:55 AM    Time: 34 minutes

## 2020-02-11 NOTE — THERAPY TREATMENT NOTE
Acute Care - Occupational Therapy Treatment Note  [facility]     Patient Name: Ford High  : 1965  MRN: [id number]  Today's Date: 2020  Onset of Illness/Injury or Date of Surgery: 20  Date of Referral to OT: 20  Referring Physician: DENISHA Bassett    Admit Date: 2020       ICD-10-CM ICD-9-CM   1. Pneumonia of right lower lobe due to infectious organism (CMS/HCC) J18.1 486   2. Decreased activities of daily living (ADL) Z78.9 V49.89   3. Gait abnormality R26.9 781.2     Patient Active Problem List   Diagnosis   • Pneumonia of right lower lobe due to infectious organism (CMS/HCC)   • Type 2 diabetes mellitus with hyperglycemia, with long-term current use of insulin (CMS/HCC)   • Leukocytosis   • Normocytic anemia   • Right-sided chest wall pain     Past Medical History:   Diagnosis Date   • Autonomic neuropathy    • Chronic diarrhea    • Diabetes mellitus (CMS/HCC)    • Diabetic foot ulcer (CMS/HCC)    • Elevated cholesterol    • Lateral epicondylitis, right elbow    • Noncompliance    • Orthostatic hypotension    • Skin infection      Past Surgical History:   Procedure Laterality Date   • TONSILLECTOMY         Therapy Treatment    Rehabilitation Treatment Summary     Row Name 20 1130             Treatment Time/Intention    Discipline  occupational therapy assistant  -      Document Type  therapy note (daily note)  -      Subjective Information  complains of;weakness;fatigue;pain  -      Mode of Treatment  occupational therapy  -      Patient Effort  poor  -CJ      Existing Precautions/Restrictions  fall;oxygen therapy device and L/min  -CJ      Recorded by [CJ] Celso Ruff COTA/L 20 1150      Row Name 20 1130             Bed Mobility Assessment/Treatment    Comment (Bed Mobility)  fowlers in bed!  -CJ      Recorded by [CJ] Celso Ruff COTA/L 20 1150      Row Name 20 1130             Motor Skills Assessment/Interventions     Additional Documentation  Therapeutic Exercise (Group)  -CJ      Recorded by [CJ] Celso Ruff COTA/L 02/11/20 1150      Row Name 02/11/20 1130             Therapeutic Exercise    Upper Extremity (Therapeutic Exercise)  bicep curl, bilateral;wand exercises  -CJ      Upper Extremity Range of Motion (Therapeutic Exercise)  elbow flexion/extension, bilateral;wrist flexion/extension, bilateral  -CJ      Weight/Resistance (Therapeutic Exercise)  3 pounds  -CJ      Exercise Type (Therapeutic Exercise)  AROM (active range of motion)  -CJ      Position (Therapeutic Exercise)  seated  -CJ      Sets/Reps (Therapeutic Exercise)  1 set x 10 reps!  -CJ      Equipment (Therapeutic Exercise)  dowel laurie/wand  -CJ      Expected Outcome (Therapeutic Exercise)  facilitate normal movement patterns;improve functional stability;improve functional tolerance, self-care activity;improve motor control;improve neuromuscular control;improve performance, BADLs;improve performance, fine motor coordination skills;improve performance, gait skills;improve performance, transfer skills;improve postural control;increase active range of motion  -CJ      Recorded by [CJ] eClso Ruff COTA/L 02/11/20 1150      Row Name 02/11/20 1130             Positioning and Restraints    Pre-Treatment Position  in bed  -CJ      Post Treatment Position  bed  -CJ      In Bed  fowlers;call light within reach;encouraged to call for assist;side rails up x2;exit alarm on  -CJ      Recorded by [CJ] Celso Ruff COTA/L 02/11/20 1150      Row Name 02/11/20 1130             Pain Assessment    Additional Documentation  Pain Scale 2: Word Pre/Post-Treatment (Group)  -CJ      Recorded by [CJ] Celso Ruff COTA/L 02/11/20 1150      Row Name 02/11/20 1130             Pain Scale: Numbers Pre/Post-Treatment    Pain Scale: Numbers, Pretreatment  3/10  -CJ      Pain Scale: Numbers, Post-Treatment  3/10  -CJ      Pain Location - Side  Bilateral  -CJ      Pain  Location  back  -CJ      Pain Intervention(s)  Rest  -CJ      Recorded by [CJ] Celso Ruff COTA/L 02/11/20 1150      Row Name 02/11/20 1130             Outcome Summary/Treatment Plan (OT)    Daily Summary of Progress (OT)  -- poor  -CJ      Barriers to Overall Progress (OT)  Fall/pain!  -CJ      Plan for Continued Treatment (OT)  Plan d/c!  -CJ      Recorded by [CJ] Celso Ruff COTA/L 02/11/20 1150        User Key  (r) = Recorded By, (t) = Taken By, (c) = Cosigned By    Initials Name Effective Dates Discipline     Celso Ruff COTA/L 08/02/16 -  OT                 OT Recommendation and Plan  Outcome Summary/Treatment Plan (OT)  Daily Summary of Progress (OT): (poor)  Barriers to Overall Progress (OT): Fall/pain!  Plan for Continued Treatment (OT): Plan d/c!  Daily Summary of Progress (OT): (poor)  Plan of Care Review  Plan of Care Reviewed With: patient  Plan of Care Reviewed With: patient  Outcome Measures     Row Name 02/11/20 1130 02/10/20 1420          How much help from another is currently needed...    Putting on and taking off regular lower body clothing?  3  -CJ  3  -CJ     Bathing (including washing, rinsing, and drying)  3  -CJ  3  -CJ     Toileting (which includes using toilet bed pan or urinal)  3  -CJ  3  -CJ     Putting on and taking off regular upper body clothing  4  -CJ  4  -CJ     Taking care of personal grooming (such as brushing teeth)  4  -  4  -CJ     Eating meals  4  -CJ  4  -CJ     AM-PAC 6 Clicks Score (OT)  21  -  21  -        Functional Assessment    Outcome Measure Options  AM-PAC 6 Clicks Daily Activity (OT)  -  AM-PAC 6 Clicks Daily Activity (OT)  -       User Key  (r) = Recorded By, (t) = Taken By, (c) = Cosigned By    Initials Name Provider Type     Celso Ruff COTA/L Occupational Therapy Assistant           Time Calculation:   Time Calculation- OT     Row Name 02/11/20 1130             Time Calculation- OT    OT Start Time  1130  -       OT Stop Time  1140  -      OT Time Calculation (min)  10 min  -      Total Timed Code Minutes- OT  10 minute(s)  -      TCU Minutes- OT  10 min  -      OT Received On  02/11/20  -      OT Goal Re-Cert Due Date  02/17/20  -        User Key  (r) = Recorded By, (t) = Taken By, (c) = Cosigned By    Initials Name Provider Type     Celso Ruff COTA/L Occupational Therapy Assistant        Therapy Charges for Today     Code Description Service Date Service Provider Modifiers Qty    99143323644 HC OT THER PROC EA 15 MIN 2/10/2020 Celso Ruff COTA/L GO 1    95989288328 HC OT THER PROC EA 15 MIN 2/11/2020 Celso Ruff COTA/L GO 1               JOSE Ayers  2/11/2020

## 2020-02-11 NOTE — PROGRESS NOTES
Continued Stay Note  [facility]     Patient Name: Ford High  MRN: [id number]  Today's Date: 2/11/2020    Admit Date: 2/6/2020    Discharge Plan     Row Name 02/11/20 1149       Plan    Plan  [facility]    Provided post acute provider list?  Yes    Post Acute Provider List  Home Health    Post Acute Provider Quality & Resource List  Yes    Delivered To  Patient    Method of Delivery  In person    Patient/Family in Agreement with Plan  yes    Final Discharge Disposition Code  06 - home with home health care    Final Note  Pt is being discharged home today.  Pt has  care ordered and with options available in county of residence he prefers [facility].  Informed [place] from  of referral [phone].  Pt had orders and qualifies for O2.  Pt prefers [facility] so call [facility] there and provided referral [phone]. They will deliver a tank to room before he leaves today.      Row Name 02/11/20 1052       Plan    Final Discharge Disposition Code  01 - home or self-care        Discharge Codes    No documentation.       Expected Discharge Date and Time     Expected Discharge Date Expected Discharge Time    Feb 11, 2020             RENE Castillo

## 2020-02-11 NOTE — PLAN OF CARE
Problem: Patient Care Overview  Goal: Plan of Care Review  Outcome: Ongoing (interventions implemented as appropriate)  Flowsheets (Taken 2/11/2020 0402)  Progress: no change  Plan of Care Reviewed With: patient  Outcome Summary: Patient angry, irrateable, and somewhat uncooperative at beginning of shift reports wants to be left alone and sleep and that people keep bothering him.  Blood glucose at  with coverage given as per orders. Patient up to bathroom with stand by assistence. Safety maintained with bed alarm in use.

## 2020-02-11 NOTE — NURSING NOTE
Patient left without waiting for meds to bed to deliver medications.Phone number on chart is of spouse who resides in nursing home.Called retail pharmacy for a working number and  did not answer, went to voicemail, which is full. Unable to reach.Called multiple times and no one answers.

## 2020-02-11 NOTE — DISCHARGE PLACEMENT REQUEST
"Lauren Greenberg [phone]  Bree Naylor (54 y.o. Male)     Date of Birth Social Security Number Address Home Phone MRN    1965  [address]     [place] [id number] [phone] [id number]    Latter-day Marital Status          Baptism        Admission Date Admission Type Admitting Provider Attending Provider Department, Room/Bed    20 Emergency Juliano Anaya, Juliano Harrison,  [address], 493/1    Discharge Date Discharge Disposition Discharge Destination         Home-Health Care [facility]              Attending Provider:  Juliano Anaya DO    Allergies:  No Known Allergies    Isolation:  None   Infection:  None   Code Status:  CPR    Ht:  190.5 cm (75\")   Wt:  73.2 kg (161 lb 4.8 oz)    Admission Cmt:  None   Principal Problem:  Pneumonia of right lower lobe due to infectious organism (CMS/[facility]) [J18.1]                 Active Insurance as of 2020     Primary Coverage     Payor Plan Insurance Group Employer/Plan Group    MEDICARE MEDICARE A & B      Payor Plan Address Payor Plan Phone Number Payor Plan Fax Number Effective Dates    [address] [phone]  3/1/2019 - None Entered    [place] [id number]       Subscriber Name Subscriber Birth Date Member ID       BREE NAYLOR 1965 [id number]                 Emergency Contacts      (Rel.) Home Phone Work Phone Mobile Phone    Lily Naylor (Spouse) [phone] -- --        [address] [address]  Dept. Phone:  [phone]  Dept. Fax:   Date Ordered: 2020         Patient:  Bree Naylor MRN:  [id number]   [address]     [facility] [address] :  1965  SSN:    Phone: [phone] Sex:  M     Weight: 73.2 kg (161 lb 4.8 oz)         Ht Readings from Last 1 Encounters:   20 190.5 cm (75\")         Oxygen Therapy         (Order ID: [id number])    Diagnosis:  Pneumonia of right lower " lobe due to infectious organism (CMS/HCC) (J18.1 [ICD-10-CM] 486 [ICD-9-CM])   Quantity:  1     Delivery Modality: Nasal Cannula  Liters Per Minute: 3  Duration: Continuous  Equipment:  Oxygen Concentrator &  &  Portable Gaseous Oxygen System & Portable Oxygen Contents Gaseous &  Conserving Regulator  Length of Need (99 Months = Lifetime): 99 Months = Lifetime        Authorizing Provider's Phone: [phone]   Verbal Order Mode: Verbal with readback   Authorizing Provider: Juliano Anaya DO  Authorizing Provider's NPI: [id number]     Order Entered By: Thalia Quezada RN 2/11/2020 11:13 AM     Electronically signed by:          Torsten Maxwell, RRT   Respiratory Therapist   Respiratory Therapy   Progress Notes   Signed   Date of Service:  02/11/20 1107   Creation Time:  02/11/20 1107            Signed             Show:Clear all  [x]Manual[x]Template[]Copied    Added by:  [x]Torsten Maxwell, RRT    [][place] for details  Exercise Oximetry     Patient Name:Ford High   MRN: [id number]   Date: 02/11/20                                                                                                     ROOM AIR BASELINE   SpO2% 78   Heart Rate    Blood Pressure       EXERCISE ON ROOM AIR SpO2% EXERCISE ON O2 @3  LPM SpO2%   1 MINUTE   1 MINUTE 90   2 MINUTES   2 MINUTES 90   3 MINUTES   3 MINUTES 91   4 MINUTES   4 MINUTES     5 MINUTES   5 MINUTES     6 MINUTES   6 MINUTES                                                                                                                   Distance Walked   Distance Walked   Dyspnea (Meg Scale)   Dyspnea (Meg Scale)   Fatigue (Meg Scale)   Fatigue (Meg Scale)   SpO2% Post Exercise   SpO2% Post Exercise   HR Post Exercise   HR Post Exercise   Time to Recovery   Time to Recovery      Comments: Patient refused to ambulate in the flores. Walked with o2@3lpm about 20' in his room .                      History & Physical      Tay London  "MD Kamila at 02/06/20 1840              [facility] Medicine Services  HISTORY AND PHYSICAL    Date of Admission: 2/6/2020  Primary Care Physician: Denys Yanez Jr., MD    Subjective     Chief Complaint: Right-sided pain    History of Present Illness  Ford High 54-year-old male with a past medical history of medical noncompliance, diabetes mellitus type 2, autonomic neuropathy, chronic diarrhea secondary to IBS, see below for complete list.  Patient presented to [facility] emergency department after 3 days illness of subjective fever, cough, right-sided pain scored 8 on a scale of 1-10.  He denies sputum production.  He states he is lost nearly 800 pounds over the past 2 months from IBS.  However, per record review patient only weighed 7 pounds more (162) 11 months ago at this facility.  ER work-up revealed glucose 265, leukocytosis with left shift, anemia, right lower lobe mass with concerns for neoplastic process, 2 cm lytic lesion with sclerotic border right hilum near the SI joint and multifocal pneumonia.  Patient was seen at [facility] 1/24/2020, unable to find ER note however patient states he was having diarrhea and vomiting and despite the fact he \"could not walk\" he was sent home.  It is documented patient does have poor medical compliance.  He reports taking NovoLog 20 units 3 times a day before meals and Levemir 50 units at bedtime.  He is admitted for further evaluation and treatment.    Review of Systems   A 10 point review of systems was completed, all negative except for those discussed in HPI    Past Medical History:   Past Medical History:   Diagnosis Date   • Autonomic neuropathy    • Chronic diarrhea    • Diabetes mellitus (CMS/HCC)    • Diabetic foot ulcer (CMS/HCC)    • Elevated cholesterol    • Lateral epicondylitis, right elbow    • Noncompliance    • Orthostatic hypotension    • Skin infection        Past Surgical History:   Past " "Surgical History:   Procedure Laterality Date   • TONSILLECTOMY         Family History: family history includes Arthritis in his father and mother; Diabetes in his father and mother; Heart disease in his father; Hyperlipidemia in his mother.    Social History:  reports that he has never smoked. He does not have any smokeless tobacco history on file. He reports that he does not drink alcohol or use drugs.    Code Status: Full, if unable speak for himself his wife will speak for him    Allergies:  No Known Allergies    Medications:  NovoLog regular insulin 20 units 3 times a day before meals  Levemir pen 50 units at bedtime    Objective     /66 (BP Location: Right arm, Patient Position: Lying)   Pulse 79   Temp 98.4 °F (36.9 °C) (Oral)   Resp 16   Ht 190.5 cm (75\")   Wt 70.3 kg (155 lb)   SpO2 100%   BMI 19.37 kg/m²    Physical Exam   Constitutional: He is oriented to person, place, and time. He appears well-developed.   Cachectic, looks older than stated age   HENT:   Head: Normocephalic and atraumatic.   Poor dentition   Eyes: Pupils are equal, round, and reactive to light. EOM are normal.   Neck: Normal range of motion. No JVD present. No tracheal deviation present.   Cardiovascular: Normal rate, regular rhythm and normal heart sounds.   No murmur heard.  Pulmonary/Chest: Effort normal. No respiratory distress. He has no wheezes (anterior assessment). He has no rales ( anterior assessment).   Severe pain noted upon deep breath   Abdominal: Soft. Bowel sounds are normal. He exhibits no distension. There is no tenderness.   Musculoskeletal: Normal range of motion. He exhibits no edema.   Neurological: He is alert and oriented to person, place, and time.   Skin: Skin is warm and dry. There is pallor.   Psychiatric: He has a normal mood and affect. His behavior is normal.       Pertinent Data:   Lab Results (last 72 hours)     Procedure Component Value Units Date/Time    Blood Culture - Blood, Wrist, Left " [[id number]] Collected:  02/06/20 1348    Specimen:  Blood from Wrist, Left Updated:  02/06/20 1547    Lactic Acid, Plasma [[id number]]  (Normal) Collected:  02/06/20 1405    Specimen:  Blood Updated:  02/06/20 1525     Lactate 0.7 mmol/L     Influenza Antigen, Rapid - Swab, Nasopharynx [[id number]]  (Normal) Collected:  02/06/20 1453    Specimen:  Swab from Nasopharynx Updated:  02/06/20 1520     Influenza A Ag, EIA Negative     Influenza B Ag, EIA Negative    Narrative:       Recommend confirmation of negative results by viral culture or molecular assay.    Troponin [[id number]]  (Normal) Collected:  02/06/20 1348    Specimen:  Blood Updated:  02/06/20 1520     Troponin T <0.010 ng/mL     Comprehensive Metabolic Panel [[id number]]  (Abnormal) Collected:  02/06/20 1348    Specimen:  Blood Updated:  02/06/20 1438     Glucose 265 mg/dL      BUN 11 mg/dL      Creatinine 0.58 mg/dL      Sodium 135 mmol/L      Potassium 4.2 mmol/L      Chloride 97 mmol/L      CO2 30.0 mmol/L      Calcium 8.6 mg/dL      Total Protein 6.9 g/dL      Albumin 3.20 g/dL      ALT (SGPT) 11 U/L      AST (SGOT) 12 U/L      Alkaline Phosphatase 104 U/L      Total Bilirubin 0.3 mg/dL      eGFR Non African Amer 146 mL/min/1.73      Globulin 3.7 gm/dL      A/G Ratio 0.9 g/dL      BUN/Creatinine Ratio 19.0     Anion Gap 8.0 mmol/L     Lipase [[id number]]  (Abnormal) Collected:  02/06/20 1348    Specimen:  Blood Updated:  02/06/20 1438     Lipase 9 U/L     Amylase [[id number]]  (Abnormal) Collected:  02/06/20 1348    Specimen:  Blood Updated:  02/06/20 1438     Amylase 12 U/L     Protime-INR [[id number]]  (Normal) Collected:  02/06/20 1348    Specimen:  Blood Updated:  02/06/20 1434     Protime 13.8 Seconds      INR 1.03    aPTT [[id number]]  (Abnormal) Collected:  02/06/20 1348    Specimen:  Blood Updated:  02/06/20 1434     PTT 39.4 seconds     CBC Auto Differential [[id number]]  (Abnormal) Collected:  02/06/20 1348    Specimen:  Blood Updated:  02/06/20 142      WBC 12.48 10*3/mm3      RBC 3.19 10*6/mm3      Hemoglobin 9.3 g/dL      Hematocrit 27.9 %      MCV 87.5 fL      MCH 29.2 pg      MCHC 33.3 g/dL      RDW 12.2 %      RDW-SD 39.6 fl      MPV 8.8 fL      Platelets 388 10*3/mm3      Neutrophil % 82.4 %      Lymphocyte % 7.9 %      Monocyte % 8.2 %      Eosinophil % 0.6 %      Basophil % 0.2 %      Immature Grans % 0.7 %      Neutrophils, Absolute 10.28 10*3/mm3      Lymphocytes, Absolute 0.98 10*3/mm3      Monocytes, Absolute 1.02 10*3/mm3      Eosinophils, Absolute 0.08 10*3/mm3      Basophils, Absolute 0.03 10*3/mm3      Immature Grans, Absolute 0.09 10*3/mm3      nRBC 0.0 /100 WBC      Imaging Results (Last 24 Hours)     Procedure Component Value Units Date/Time    XR Chest 1 View [[id number]] Collected:  02/06/20 1610     Updated:  02/06/20 1616    Narrative:       XR CHEST 1 VW-     Indication: fever. cough     Comparison: Same day CT     Findings:     Cardiac silhouette is upper limits of normal in size.   No pleural effusion or visible pneumothorax.   Focal airspace opacity in the right lower lobe and left upper lobe.   No suspicious osseous lesion.       Impression:          Multifocal bilateral airspace opacity, suspicious for multifocal  pneumonia. Recommend follow-up imaging to document resolution and  exclude neoplastic process.  This report was finalized on 02/06/2020 16:13 by Dr. Jared Cerrato MD.    CT Abdomen Pelvis Without Contrast [[id number]] Collected:  02/06/20 1555     Updated:  02/06/20 1607    Narrative:       EXAMINATION: CT ABDOMEN PELVIS WO CONTRAST-  2/6/2020 3:55 PM CST     HISTORY: Abdominal pain     COMPARISON:None     TECHNIQUE:  Radiation dose equals  mGy-cm.  Automated exposure  control dose reduction technique was implemented.     Thin section axial imaging was obtained. 2-D sagittal and coronal  reconstruction images were generated.     Intravenous contrast was not administered.     Oral contrast was not ingested.        FINDINGS:     There is imaged part degradation particularly in the lower  abdomen/pelvis due to patient motion.     There is a pleural-based, masslike-infiltrative airspace consolidation  in the right lower lobe posterior medially with focal area of cystic  change centrally within the consolidation. Acute infectious/inflammatory  process considered. Malignancy could also have this appearance.  Correlate with patient presentation.     There is no CT evidence of gallstones.     There is no focal hepatic lesions. There is no biliary ductal  dilatation.     The spleen is nonenlarged.     There are no adrenal masses.     There are no pancreatic lesions.     There are no urinary tract calculi.     There is no pelvocaliectasis or hydroureter. The urinary bladder is  mildly distended. There is no focal bladder wall abnormality. Prostate  gland is not enlarged.     There is moderate amount of stool identified throughout the colon which  is not dilated. The appendix is not inflamed.     The small bowel is not dilated.     The duodenal sweep is imaged appropriately.     The stomach is not distended.     There are no pathologically enlarged lymph nodes.     There is atherosclerotic aorto iliac and femoral calcifications.     There is no ascites or pneumoperitoneum.     No acute osseous abnormalities observed.     There is a 2 cm lytic lesion with sclerotic border identified in the  right ilium near the SI joint, nonaggressive appearing.       Impression:       1. Infiltrative masslike area of airspace consolidation in the right  lower lobe, pleural-based with focal cystic change centrally.  Differential considerations include infectious/inflammatory change,  pneumonia, as well as pulmonary neoplasm/malignancy. Correlate with  patient presentation, follow-up recommended.  2. Atherosclerotic calcifications.  3. No CT evidence of acute intra-abdominal/pelvic pathological process.  This report was finalized on 02/06/2020 16:04 by  Dr. Cortes Ruff MD.            I have personally reviewed and interpreted the radiology studies and ECG obtained at time of admission.     Assessment / Plan     Assessment:   Active Hospital Problems    Diagnosis   • Pneumonia of right lower lobe due to infectious organism (CMS/HCC)   • Type 2 diabetes mellitus with hyperglycemia, with long-term current use of insulin (CMS/HCC)   • Leukocytosis   • Normocytic anemia   • Right-sided chest wall pain   Concerns for lung cancer with metastatic disease due to sclerotic lesion border right ilium near SI joint    Plan:   1.  Admit as inpatient  2.  Consult pulmonology  3.  Home medication reviewed  4.  DVT prophylaxis with SCDs  5.  Continue Rocephin and azithromycin  6.  Incentive spirometry, supplemental O2, continuous pulse oximetry, nebs, ABGs as needed  7.  RT to initiate breathing treatment protocol  8.  Additional labs anemia studies, urine for Legionella and strep pneumoniae, respiratory culture, urinalysis with culture  9.  Labs in a.m.  10.  Normal saline 100 mL/hour  11.  Monitor glucose before meals and at bedtime with regular insulin sliding scale coverage  12.  Pain management  13.  N.p.o. after midnight for possible biopsy in a.m.    I discussed the patient's findings and my recommendations with: Tay London MD  Time spent: 40 minutes    Patient seen and examined by me on 2/6/2020 at 6:50 PM.    DENISHA Jerome  02/06/20   8:13 PM     I personally evaluated and examined the patient in conjunction with  Eliseo DENNY and agree with the assessment, treatment plan, and disposition of the patient as recorded by her. My history, exam, and further recommendations are:     Vitals:    02/06/20 1950   BP: 104/66   Pulse: 79   Resp: 16   Temp: 98.4 °F (36.9 °C)   SpO2: 100%   He's a 54-year-old man who came to the emergency room for evaluation of abdominal  pain. He's admitted with a diagnosis of right lower lobe pneumonia. Review of records indicates  that he has been complaining of right sided pain that radiates his back/side after his abdomen worse with movement and coughing. His vital signs are stable but slightly tachycardic.  CXR on my review showed fullness of right hilum. However, CT of  abdomen and pelvis in evaluation for abdominal  pain apparently showed infiltrative like pleural base  consolidation in the right lower lobe. He received .ceftriaxone, azithromycin, dilaudid,  Zofran and Advil +1 L of IV fluid    On encounter, he said 'I'm hungry  He seems chronically ill.  He has point tenderness on right flank, up to the side of his ribs. His BMi is 19.      Will treat for pna. Consult Pulmonary. May need IR for biopsy.   Agree with above  Tay London MD  02/06/20  10:27 PM          Electronically signed by Tay London MD at 02/06/20 4570          Physician Progress Notes (last 24 hours) (Notes from 02/10/20 1147 through 02/11/20 1147)      Juliano Anaya DO at 02/10/20 1545              [facility] Medicine Services  INPATIENT PROGRESS NOTE    Patient Name: Ford High  Date of Admission: 2/6/2020  Today's Date: 02/10/20  Length of Stay: 4  Primary Care Physician: Denys Yanez Jr., MD    Subjective   Chief Complaint: Follow-up pneumonia    HPI   Patient seen and examined.  Overall he is feeling better but still having pleuritic right rib pain with deep inspiration and then when he lays on his right side.  No nausea or vomiting.  Still needing 1-2 L nasal cannula on and off.  Low-grade fever overnight otherwise doing well without any issues.  Patient was to be placed at [facility] but now has declined SNF placement and wants to go home when ready.        Review of Systems   All pertinent negatives and positives are as above. All other systems have been reviewed and are negative unless otherwise stated.     Objective    Temp:  [98.3 °F (36.8 °C)-100.3 °F (37.9 °C)] 98.3 °F (36.8  °C)  Heart Rate:  [] 98  Resp:  [16-18] 18  BP: (127-155)/(71-87) 148/84  Physical Exam  GEN: Awake, alert, interactive, in NAD  HEENT: Atraumatic, PERRLA, EOMI, Anicteric, Trachea midline  Lungs: sounds somewhat diminished at R base otherwise CTAB, no wheezing  Heart: RRR, +S1/s2, no rub  ABD: soft, nt/nd, +BS, no guarding/rebound  Extremities: atraumatic, no cyanosis, no edema  Skin: no rashes or petechiae  Neuro: AAOx3, no focal deficits  Psych: normal mood & affect        Results Review:  I have reviewed the labs, radiology results, and diagnostic studies.    Laboratory Data:   Results from last 7 days   Lab Units 02/10/20  0448 02/08/20 0519 02/07/20 0447   WBC 10*3/mm3 11.45* 14.06* 16.07*   HEMOGLOBIN g/dL 9.7* 7.8* 9.1*   HEMATOCRIT % 30.4* 24.1* 27.6*   PLATELETS 10*3/mm3 507* 387 368        Results from last 7 days   Lab Units 02/07/20  0447 02/06/20  1348   SODIUM mmol/L 141 135*   POTASSIUM mmol/L 4.3 4.2   CHLORIDE mmol/L 102 97*   CO2 mmol/L 29.0 30.0*   BUN mg/dL 10 11   CREATININE mg/dL 0.52* 0.58*   CALCIUM mg/dL 8.5* 8.6   BILIRUBIN mg/dL  --  0.3   ALK PHOS U/L  --  104   ALT (SGPT) U/L  --  11   AST (SGOT) U/L  --  12   GLUCOSE mg/dL 285* 265*       Culture Data:   Blood Culture   Date Value Ref Range Status   02/06/2020 No growth at 4 days  Preliminary   02/06/2020 No growth at 3 days  Preliminary       Radiology Data:   Imaging Results (Last 24 Hours)     ** No results found for the last 24 hours. **          I have reviewed the patient's current medications.     Assessment/Plan     Active Hospital Problems    Diagnosis   • **Pneumonia of right lower lobe due to infectious organism (CMS/HCC)   • Type 2 diabetes mellitus with hyperglycemia, with long-term current use of insulin (CMS/HCC)   • Leukocytosis   • Normocytic anemia   • Right-sided chest wall pain       #1 right lower lobe pneumonia -dense on imaging.  Has been improving on ceftriaxone and azithromycin.  Still having low-grade  fevers but his fever curve is trending down.  Was 100.0 today.  Seen by pulmonary recommend outpatient CT of the chest in follow-up.    #2 leukocytosis -in the setting of pneumonia.  Has resolved.    #3 normocytic anemia -H&H stable.  No signs of active bleeding    #4 uncontrolled diabetes -A1c is 15.  His sugars are doing well here on half of his prescribed home insulin dose.  Suspect noncompliance with diet medication at home.  Continue current insulin and hypoglycemia protocol.    Discharge Planning: Hopeful for change to oral antibiotics and discharge home tomorrow.  May need O2 eval prior to discharge.  Patient was recommended for SNF but is declining.  Will plan for home health services if he will allow.    Juliano Anaya DO   02/10/20   3:45 PM      Electronically signed by Juliano Anaya DO at 02/10/20 0715       Consult Notes (last 24 hours) (Notes from 02/10/20 1147 through 02/11/20 1147)    No notes of this type exist for this encounter.

## 2020-02-12 ENCOUNTER — READMISSION MANAGEMENT (OUTPATIENT)
Dept: CALL CENTER | Facility: HOSPITAL | Age: 55
End: 2020-02-12

## 2020-02-12 ENCOUNTER — HOSPITAL ENCOUNTER (EMERGENCY)
Facility: HOSPITAL | Age: 55
End: 2020-02-12

## 2020-02-12 NOTE — OUTREACH NOTE
Prep Survey      Responses   Facility patient discharged from?  [facility]   Is patient eligible?  Yes   Discharge diagnosis  Pneumonia of right lower lobe due to infectious    Does the patient have one of the following disease processes/diagnoses(primary or secondary)?  COPD/Pneumonia   Does the patient have Home health ordered?  Yes   What is the Home health agency?   [facility]    Is there a DME ordered?  Yes   What DME was ordered?  Oxygen per [facility]    Prep survey completed?  Yes          Sherie Salas RN

## 2020-02-12 NOTE — DISCHARGE PLACEMENT REQUEST
"[facility]  RUBY GABRIEL RN CM [id number]        Lennox Bree N (54 y.o. Male)     Date of Birth Social Security Number Address Home Phone MRN    1965  [address]   [address] [place] [id number] [phone] [id number]    Orthodox Marital Status          Lutheran        Admission Date Admission Type Admitting Provider Attending Provider Department, Room/Bed    2/6/20 Emergency Juliano Anaya DO  [facility] 4C, 493/1    Discharge Date Discharge Disposition Discharge Destination        2/11/2020 Home-Health Care [facility]             Attending Provider:  (none)   Allergies:  No Known Allergies    Isolation:  None   Infection:  None   Code Status:  Prior    Ht:  190.5 cm (75\")   Wt:  73.2 kg (161 lb 4.8 oz)    Admission Cmt:  None   Principal Problem:  Pneumonia of right lower lobe due to infectious organism (CMS/HCC) [J18.1]                 Active Insurance as of 2/6/2020     Primary Coverage     Payor Plan Insurance Group Employer/Plan Group    MEDICARE MEDICARE A & B      Payor Plan Address Payor Plan Phone Number Payor Plan Fax Number Effective Dates    [address] [phone]  3/1/2019 - None Entered    [place] [id number]       Subscriber Name Subscriber Birth Date Member ID       BREE HIGH SAYRA 1965 [id number]                 Emergency Contacts      (Rel.) Home Phone Work Phone Mobile Phone    HighBree seymour (Other) [phone] -- --               Discharge Summary      Juliano Anaya DO at 02/11/20 [address] [place] [address] Medicine Services  DISCHARGE SUMMARY       Date of Admission: 2/6/2020  Date of Discharge:  2/11/2020  Primary Care Physician: Denys Yanez Jr., MD    Presenting Problem/History of Present Illness:  Pneumonia of right lower lobe due to infectious organism (CMS/HCC) [J18.1]     Final Discharge Diagnoses:  Active Hospital Problems    Diagnosis   • " **Pneumonia of right lower lobe due to infectious organism (CMS/HCC)   • Type 2 diabetes mellitus with hyperglycemia, with long-term current use of insulin (CMS/HCC)   • Leukocytosis   • Normocytic anemia   • Right-sided chest wall pain       Consults:   #1 Dr. Mckenna, pulmonology    Procedures Performed: none    Pertinent Test Results:   Procedure Component Value Units Date/Time   XR Chest 1 View [[id number]] Hector as Reviewed   Order Status: Completed Collected: 02/06/20 1610    Updated: 02/06/20 1616   Narrative:     XR CHEST 1 VW-     Indication: fever. cough     Comparison: Same day CT     Findings:     Cardiac silhouette is upper limits of normal in size.   No pleural effusion or visible pneumothorax.   Focal airspace opacity in the right lower lobe and left upper lobe.   No suspicious osseous lesion.      Impression:        Multifocal bilateral airspace opacity, suspicious for multifocal  pneumonia. Recommend follow-up imaging to document resolution and  exclude neoplastic process.  This report was finalized on 02/06/2020 16:13 by Dr. Jared Cerrato MD.   CT Abdomen Pelvis Without Contrast [[id number]] Hector as Reviewed   Order Status: Completed Collected: 02/06/20 1555    Updated: 02/06/20 1607   Narrative:     EXAMINATION: CT ABDOMEN PELVIS WO CONTRAST-  2/6/2020 3:55 PM CST     HISTORY: Abdominal pain     COMPARISON:None     TECHNIQUE:  Radiation dose equals  mGy-cm.  Automated exposure  control dose reduction technique was implemented.     Thin section axial imaging was obtained. 2-D sagittal and coronal  reconstruction images were generated.     Intravenous contrast was not administered.     Oral contrast was not ingested.     FINDINGS:     There is imaged part degradation particularly in the lower  abdomen/pelvis due to patient motion.     There is a pleural-based, masslike-infiltrative airspace consolidation  in the right lower lobe posterior medially with focal area of cystic  change centrally  within the consolidation. Acute infectious/inflammatory  process considered. Malignancy could also have this appearance.  Correlate with patient presentation.     There is no CT evidence of gallstones.     There is no focal hepatic lesions. There is no biliary ductal  dilatation.     The spleen is nonenlarged.     There are no adrenal masses.     There are no pancreatic lesions.     There are no urinary tract calculi.     There is no pelvocaliectasis or hydroureter. The urinary bladder is  mildly distended. There is no focal bladder wall abnormality. Prostate  gland is not enlarged.     There is moderate amount of stool identified throughout the colon which  is not dilated. The appendix is not inflamed.     The small bowel is not dilated.     The duodenal sweep is imaged appropriately.     The stomach is not distended.     There are no pathologically enlarged lymph nodes.     There is atherosclerotic aorto iliac and femoral calcifications.     There is no ascites or pneumoperitoneum.     No acute osseous abnormalities observed.     There is a 2 cm lytic lesion with sclerotic border identified in the  right ilium near the SI joint, nonaggressive appearing.      Impression:     1. Infiltrative masslike area of airspace consolidation in the right  lower lobe, pleural-based with focal cystic change centrally.  Differential considerations include infectious/inflammatory change,  pneumonia, as well as pulmonary neoplasm/malignancy. Correlate with  patient presentation, follow-up recommended.  2. Atherosclerotic calcifications.  3. No CT evidence of acute intra-abdominal/pelvic pathological process.  This report was finalized on 02/06/2020 16:04 by Dr. Cortes Ruff MD.         Chief Complaint on Day of Discharge: f/u sob/pna    History of Present Illness on Day of Discharge:   Patient seen and examined.  Cough has improved.  Overall doing better.  No chest pain today.  No nausea or vomiting.  Initially offered discharge  to skilled nursing facility but declines and wants to go home.  Will discharge today with home health.    Hospital Course:  The patient is a 54 y.o. male who presented to [facility] on 2/6 with right sided chest/rib pain.  He is a noncompliant patient.  Type 2 diabetes.  Autonomic neuropathy.  He had 3 days of fever, cough, right-sided chest pain.  States he had lost some weight recently but because of his chronic diarrhea and IBS.  By our records he was 7 pounds heavier than he was 11 months ago at our facility.  In the ER patient was found to be hyperglycemic with a chest x-ray showing right lower lobe pneumonia.  Patient also had a CT of his abdomen in the ER which was nonacute for abdominal issues but again showed a masslike infiltration in the right lower lung.  Patient was admitted to the hospital on ceftriaxone and azithromycin with pulmonary consultation.  Pulmonary stated no need for inpatient CAT scan at this time due to underlying current illness and imaging.  Recommend follow-up CT of his chest in 2 to 4 weeks to look for resolution of pneumonia and/or any residual potential malignancy.  He otherwise has improved.  He is weaned down to 1 L of oxygen and not always needing it.  He remains somewhat weak and we recommended SNF discharge patient declines.  He is accepting of home health services.  At this time we will discharge him home with home health therapy to continue PT, OT, home safety eval.  We will continue Omnicef and doxycycline for 5 more days to complete 10-day course of antibiotics.  Of note his A1c was 15.  His home med list shows 20 units of short acting insulin with meals and 50 of Lantus.  Here he was on 5 of short acting 25 Levemir and his sugars have been well controlled.  Again patient appears to be noncompliant at baseline and suspect he is either not following his diet or not taking his insulin.  Needs follow-up with PCP after discharge.  Medicines adjusted for discharge.   "See below.    Condition on Discharge: Stable/improved    Physical Exam on Discharge:  /84 (Patient Position: Lying)   Pulse 97   Temp 98.8 °F (37.1 °C) (Oral)   Resp 16   Ht 190.5 cm (75\")   Wt 73.2 kg (161 lb 4.8 oz)   SpO2 92%   BMI 20.16 kg/m²    Physical Exam  GEN: Awake, alert, interactive, in NAD  HEENT: Atraumatic, PERRLA, EOMI, Anicteric, Trachea midline  Lungs:  CTAB, no wheezing  Heart: RRR, +S1/s2, no rub  ABD: soft, nt/nd, +BS, no guarding/rebound  Extremities: atraumatic, no cyanosis, no edema  Skin: no rashes or petechiae  Neuro: AAOx3, no focal deficits  Psych: normal mood & affect    Discharge Disposition:  [place]-Health [place] [facility]    Discharge Medications:     Discharge Medications      New Medications      Instructions Start Date   cefdinir 300 MG capsule  Commonly known as:  OMNICEF   300 mg, Oral, 2 Times Daily      doxycycline 100 MG tablet  Commonly known as:  VIBRAMYICN   100 mg, Oral, 2 Times Daily         Changes to Medications      Instructions Start Date   insulin aspart 100 UNIT/ML solution pen-injector sc pen  Commonly known as:  NOVOLOG FLEXPEN  What changed:  how much to take   5 Units, Subcutaneous, 3 Times Daily With Meals      insulin glargine 100 UNIT/ML injection  Commonly known as:  LANTUS  What changed:  how much to take   25 Units, Subcutaneous, Nightly         Continue These Medications      Instructions Start Date   atorvastatin 20 MG tablet  Commonly known as:  LIPITOR   20 mg, Oral, Daily      dicyclomine 20 MG tablet  Commonly known as:  BENTYL   20 mg, Oral, 4 Times Daily PRN      diphenoxylate-atropine 2.5-0.025 MG per tablet  Commonly known as:  LOMOTIL   1-2 tablets, Oral, 3 Times Daily PRN      midodrine 10 MG tablet  Commonly known as:  PROAMATINE   10 mg, Oral, 2 Times Daily      riFAXIMin 550 MG tablet  Commonly known as:  XIFAXAN   550 mg, Oral, Every 8 Hours Scheduled             Discharge Diet: Carb consistent    Activity at Discharge: As " tolerated    Discharge Care Plan/Instructions: Follow-up with your primary care provider.  You need a CT of your chest in 2 to 4 weeks to follow-up for resolution of pneumonia and rule out any underlying malignancy.    Follow-up Appointments:   No future appointments.    Test Results Pending at Discharge: None    Juliano Anaya DO  02/11/20  11:55 AM    Time: 34 minutes          Electronically signed by Juliano Anaya DO at 02/11/20 0577

## 2020-02-13 ENCOUNTER — READMISSION MANAGEMENT (OUTPATIENT)
Dept: CALL CENTER | Facility: HOSPITAL | Age: 55
End: 2020-02-13

## 2020-02-13 NOTE — OUTREACH NOTE
COPD/PN Week 1 Survey      Responses   Facility patient discharged from?  [facility]   Does the patient have one of the following disease processes/diagnoses(primary or secondary)?  COPD/Pneumonia   Is there a successful TCM telephone encounter documented?  No   Was the primary reason for admission:  Pneumonia   Week 1 attempt successful?  No   Unsuccessful attempts  Attempt 1          Cary Ruby RN

## 2020-02-14 ENCOUNTER — READMISSION MANAGEMENT (OUTPATIENT)
Dept: CALL CENTER | Facility: HOSPITAL | Age: 55
End: 2020-02-14

## 2020-02-14 NOTE — OUTREACH NOTE
COPD/PN Week 1 Survey      Responses   Facility patient discharged from?  [place]   Does the patient have one of the following disease processes/diagnoses(primary or secondary)?  COPD/Pneumonia   Is there a successful TCM telephone encounter documented?  No   Was the primary reason for admission:  Pneumonia   Week 1 attempt successful?  No   Unsuccessful attempts  Attempt 2          Mindy Souza RN

## 2020-02-19 ENCOUNTER — READMISSION MANAGEMENT (OUTPATIENT)
Dept: CALL CENTER | Facility: HOSPITAL | Age: 55
End: 2020-02-19

## 2020-02-19 NOTE — OUTREACH NOTE
COPD/PN Week 2 Survey      Responses   Facility patient discharged from?  [place]   Does the patient have one of the following disease processes/diagnoses(primary or secondary)?  COPD/Pneumonia   Was the primary reason for admission:  Pneumonia   Week 2 attempt successful?  No   Unsuccessful attempts  Attempt 1          Zara Kyle, RN

## 2020-02-20 ENCOUNTER — READMISSION MANAGEMENT (OUTPATIENT)
Dept: CALL CENTER | Facility: HOSPITAL | Age: 55
End: 2020-02-20

## 2020-02-20 NOTE — OUTREACH NOTE
COPD/PN Week 2 Survey      Responses   Facility patient discharged from?  [facility]   Does the patient have one of the following disease processes/diagnoses(primary or secondary)?  COPD/Pneumonia   Was the primary reason for admission:  Pneumonia   Week 2 attempt successful?  No   Unsuccessful attempts  Attempt 2          Thalia Bernard RN

## 2020-02-25 ENCOUNTER — READMISSION MANAGEMENT (OUTPATIENT)
Dept: CALL CENTER | Facility: HOSPITAL | Age: 55
End: 2020-02-25

## 2020-02-25 NOTE — OUTREACH NOTE
COPD/PN Week 3 Survey      Responses   Facility patient discharged from?  [place]   Does the patient have one of the following disease processes/diagnoses(primary or secondary)?  COPD/Pneumonia   Was the primary reason for admission:  Pneumonia   Week 3 attempt successful?  No   Unsuccessful attempts  Attempt 1          Sofia Pina RN

## 2020-02-26 ENCOUNTER — READMISSION MANAGEMENT (OUTPATIENT)
Dept: CALL CENTER | Facility: HOSPITAL | Age: 55
End: 2020-02-26

## 2020-02-26 NOTE — OUTREACH NOTE
COPD/PN Week 3 Survey      Responses   Facility patient discharged from?  [facility]   Does the patient have one of the following disease processes/diagnoses(primary or secondary)?  COPD/Pneumonia   Was the primary reason for admission:  Pneumonia   Week 3 attempt successful?  No   Unsuccessful attempts  Attempt 2          Mindy Souza RN

## 2020-03-09 ENCOUNTER — TRANSCRIBE ORDERS (OUTPATIENT)
Dept: ADMINISTRATIVE | Facility: HOSPITAL | Age: 55
End: 2020-03-09

## 2020-03-09 DIAGNOSIS — J18.9 PNEUMONIA DUE TO INFECTIOUS ORGANISM, UNSPECIFIED LATERALITY, UNSPECIFIED PART OF LUNG: Primary | ICD-10-CM

## 2020-03-12 ENCOUNTER — APPOINTMENT (OUTPATIENT)
Dept: CT IMAGING | Facility: HOSPITAL | Age: 55
End: 2020-03-12

## 2020-07-06 ENCOUNTER — APPOINTMENT (OUTPATIENT)
Dept: GENERAL RADIOLOGY | Facility: HOSPITAL | Age: 55
End: 2020-07-06

## 2020-07-06 ENCOUNTER — APPOINTMENT (OUTPATIENT)
Dept: CT IMAGING | Facility: HOSPITAL | Age: 55
End: 2020-07-06

## 2020-07-06 ENCOUNTER — HOSPITAL ENCOUNTER (EMERGENCY)
Facility: HOSPITAL | Age: 55
Discharge: HOME | End: 2020-07-06
Attending: INTERNAL MEDICINE | Admitting: INTERNAL MEDICINE

## 2020-07-06 VITALS
SYSTOLIC BLOOD PRESSURE: 140 MMHG | BODY MASS INDEX: 15.27 KG/M2 | HEIGHT: 75 IN | TEMPERATURE: 98.3 F | OXYGEN SATURATION: 96 % | DIASTOLIC BLOOD PRESSURE: 85 MMHG | WEIGHT: 122.8 LBS | HEART RATE: 88 BPM | RESPIRATION RATE: 16 BRPM

## 2020-07-06 DIAGNOSIS — J18.9 PNEUMONIA OF RIGHT UPPER LOBE DUE TO INFECTIOUS ORGANISM: Primary | ICD-10-CM

## 2020-07-06 LAB
ALBUMIN SERPL-MCNC: 3.5 G/DL (ref 3.5–5.2)
ALBUMIN/GLOB SERPL: 0.9 G/DL
ALP SERPL-CCNC: 101 U/L (ref 39–117)
ALT SERPL W P-5'-P-CCNC: <5 U/L (ref 1–41)
ANION GAP SERPL CALCULATED.3IONS-SCNC: 13 MMOL/L (ref 5–15)
AST SERPL-CCNC: 9 U/L (ref 1–40)
BASOPHILS # BLD AUTO: 0.05 10*3/MM3 (ref 0–0.2)
BASOPHILS NFR BLD AUTO: 0.4 % (ref 0–1.5)
BILIRUB SERPL-MCNC: <0.2 MG/DL (ref 0.2–1.2)
BUN SERPL-MCNC: 19 MG/DL (ref 6–20)
BUN/CREAT SERPL: 28.8 (ref 7–25)
CALCIUM SPEC-SCNC: 9.3 MG/DL (ref 8.6–10.5)
CHLORIDE SERPL-SCNC: 92 MMOL/L (ref 98–107)
CO2 SERPL-SCNC: 31 MMOL/L (ref 22–29)
CREAT SERPL-MCNC: 0.66 MG/DL (ref 0.76–1.27)
CRP SERPL-MCNC: 13.62 MG/DL (ref 0–0.5)
D DIMER PPP FEU-MCNC: 0.76 MG/L (FEU) (ref 0–0.5)
D-LACTATE SERPL-SCNC: 1.9 MMOL/L (ref 0.5–2)
DEPRECATED RDW RBC AUTO: 40.4 FL (ref 37–54)
EOSINOPHIL # BLD AUTO: 0.15 10*3/MM3 (ref 0–0.4)
EOSINOPHIL NFR BLD AUTO: 1.3 % (ref 0.3–6.2)
ERYTHROCYTE [DISTWIDTH] IN BLOOD BY AUTOMATED COUNT: 13.2 % (ref 12.3–15.4)
GFR SERPL CREATININE-BSD FRML MDRD: 125 ML/MIN/1.73
GLOBULIN UR ELPH-MCNC: 3.9 GM/DL
GLUCOSE SERPL-MCNC: 332 MG/DL (ref 65–99)
HCT VFR BLD AUTO: 28.7 % (ref 37.5–51)
HGB BLD-MCNC: 9.5 G/DL (ref 13–17.7)
HOLD SPECIMEN: NORMAL
IMM GRANULOCYTES # BLD AUTO: 0.05 10*3/MM3 (ref 0–0.05)
IMM GRANULOCYTES NFR BLD AUTO: 0.4 % (ref 0–0.5)
LDH SERPL-CCNC: 123 U/L (ref 135–225)
LYMPHOCYTES # BLD AUTO: 1.82 10*3/MM3 (ref 0.7–3.1)
LYMPHOCYTES NFR BLD AUTO: 15.9 % (ref 19.6–45.3)
MCH RBC QN AUTO: 27.9 PG (ref 26.6–33)
MCHC RBC AUTO-ENTMCNC: 33.1 G/DL (ref 31.5–35.7)
MCV RBC AUTO: 84.4 FL (ref 79–97)
MONOCYTES # BLD AUTO: 0.42 10*3/MM3 (ref 0.1–0.9)
MONOCYTES NFR BLD AUTO: 3.7 % (ref 5–12)
NEUTROPHILS NFR BLD AUTO: 78.3 % (ref 42.7–76)
NEUTROPHILS NFR BLD AUTO: 8.95 10*3/MM3 (ref 1.7–7)
NRBC BLD AUTO-RTO: 0 /100 WBC (ref 0–0.2)
PLATELET # BLD AUTO: 481 10*3/MM3 (ref 140–450)
PMV BLD AUTO: 8.7 FL (ref 6–12)
POTASSIUM SERPL-SCNC: 3.8 MMOL/L (ref 3.5–5.2)
PROCALCITONIN SERPL-MCNC: 0.09 NG/ML (ref 0.1–0.25)
PROT SERPL-MCNC: 7.4 G/DL (ref 6–8.5)
RBC # BLD AUTO: 3.4 10*6/MM3 (ref 4.14–5.8)
SARS-COV-2 RDRP RESP QL NAA+PROBE: NOT DETECTED
SODIUM SERPL-SCNC: 136 MMOL/L (ref 136–145)
WBC # BLD AUTO: 11.44 10*3/MM3 (ref 3.4–10.8)
WHOLE BLOOD HOLD SPECIMEN: NORMAL
WHOLE BLOOD HOLD SPECIMEN: NORMAL

## 2020-07-06 PROCEDURE — 99285 EMERGENCY DEPT VISIT HI MDM: CPT

## 2020-07-06 PROCEDURE — 25010000002 CEFTRIAXONE PER 250 MG: Performed by: INTERNAL MEDICINE

## 2020-07-06 PROCEDURE — 83605 ASSAY OF LACTIC ACID: CPT | Performed by: INTERNAL MEDICINE

## 2020-07-06 PROCEDURE — 71275 CT ANGIOGRAPHY CHEST: CPT

## 2020-07-06 PROCEDURE — 86140 C-REACTIVE PROTEIN: CPT | Performed by: INTERNAL MEDICINE

## 2020-07-06 PROCEDURE — 85379 FIBRIN DEGRADATION QUANT: CPT | Performed by: INTERNAL MEDICINE

## 2020-07-06 PROCEDURE — 84145 PROCALCITONIN (PCT): CPT | Performed by: INTERNAL MEDICINE

## 2020-07-06 PROCEDURE — 96365 THER/PROPH/DIAG IV INF INIT: CPT

## 2020-07-06 PROCEDURE — 85025 COMPLETE CBC W/AUTO DIFF WBC: CPT | Performed by: INTERNAL MEDICINE

## 2020-07-06 PROCEDURE — 36415 COLL VENOUS BLD VENIPUNCTURE: CPT

## 2020-07-06 PROCEDURE — 87635 SARS-COV-2 COVID-19 AMP PRB: CPT | Performed by: INTERNAL MEDICINE

## 2020-07-06 PROCEDURE — 0 IOPAMIDOL PER 1 ML: Performed by: INTERNAL MEDICINE

## 2020-07-06 PROCEDURE — 80053 COMPREHEN METABOLIC PANEL: CPT | Performed by: INTERNAL MEDICINE

## 2020-07-06 PROCEDURE — 71045 X-RAY EXAM CHEST 1 VIEW: CPT

## 2020-07-06 PROCEDURE — 99284 EMERGENCY DEPT VISIT MOD MDM: CPT

## 2020-07-06 PROCEDURE — 83615 LACTATE (LD) (LDH) ENZYME: CPT | Performed by: INTERNAL MEDICINE

## 2020-07-06 RX ORDER — CEFDINIR 300 MG/1
300 CAPSULE ORAL 2 TIMES DAILY
Qty: 10 CAPSULE | Refills: 0 | Status: SHIPPED | OUTPATIENT
Start: 2020-07-06

## 2020-07-06 RX ADMIN — CEFTRIAXONE SODIUM 1 G: 1 INJECTION, POWDER, FOR SOLUTION INTRAMUSCULAR; INTRAVENOUS at 03:39

## 2020-07-06 RX ADMIN — IOPAMIDOL 100 ML: 755 INJECTION, SOLUTION INTRAVENOUS at 03:35

## 2020-07-06 NOTE — ED NOTES
[employer] NOTIFIED FOR THE PATIENT TO BE TRANSPORTED BACK TO THE [facility].      Venkata Atkinson RN  07/06/20 7613

## 2020-07-06 NOTE — DISCHARGE INSTRUCTIONS
Community-Acquired Pneumonia, Adult  Pneumonia is a type of lung infection that causes swelling in the airways of the lungs. Mucus and fluid may also build up inside the airways. This may cause coughing and difficulty breathing.  There are different types of pneumonia. One type can develop while a person is in a hospital. A different type is called community-acquired pneumonia. It develops in people who are not, and have not recently been, in the hospital or another type of health care facility.  What are the causes?  This condition may be caused by:  · Viruses. This is the most common cause of pneumonia.  · Bacteria. Community-acquired pneumonia is often caused by Streptococcus pneumoniae bacteria. These bacteria are often passed from one person to another by breathing in droplets from the cough or sneeze of an infected person.  · Fungi. This is the least common cause of pneumonia.  What increases the risk?  The following factors may make you more likely to develop this condition:  · Having a chronic disease, such as chronic obstructive pulmonary disease (COPD), asthma, congestive heart failure, cystic fibrosis, diabetes, or kidney disease.  · Having early-stage or late-stage HIV.  · Having sickle cell disease.  · Having had your spleen removed (splenectomy).  · Having poor dental hygiene.  · Having a medical condition that increases the risk of breathing in (aspirating) secretions from your own mouth and nose.  · Having a weakened body defense system (immune system).  · Being a smoker.  · Traveling to areas where pneumonia-causing germs commonly exist.  · Being around animal habitats or animals that have pneumonia-causing germs, including birds, bats, rabbits, cats, and farm animals.  What are the signs or symptoms?  Symptoms of this condition include:  · A dry cough.  · A wet (productive) cough.  · Fever.  · Sweating.  · Chest pain, especially when breathing deeply or coughing.  · Rapid breathing or difficulty  breathing.  · Shortness of breath.  · Shaking chills.  · Fatigue.  · Muscle aches.  How is this diagnosed?  This condition may be diagnosed based on:  · Your medical history.  · A physical exam.  You may also have tests, including:  · Chest X-rays.  · Tests of your blood oxygen level and other blood gases.  · Tests on blood, mucus (sputum), fluid around your lungs (pleural fluid), and urine.  If your pneumonia is severe, other tests may be done to find the exact cause of your illness.  How is this treated?  Treatment for this condition depends on many factors, such as the cause of your pneumonia, the medicines you take, and other medical conditions that you have.  For most adults, treatment and recovery from pneumonia may occur at home. In some cases, treatment must happen in a hospital. Treatment may include:  · Medicines that are given by mouth or through an IV, including:  ? Antibiotic medicines, if the pneumonia was caused by bacteria.  ? Antiviral medicines, if the pneumonia was caused by a virus.  · Being given extra oxygen.  · Respiratory therapy.  Although rare, treating severe pneumonia may include:  · Using a machine to help you breathe (mechanical ventilation). This is done if you are not breathing well on your own and you cannot maintain a safe blood oxygen level.  · Thoracentesis. This is a procedure to remove fluid from around one lung or both lungs to help you breathe better.  Follow these instructions at home:    Medicines  · Take over-the-counter and prescription medicines only as told by your health care provider.  ? Only take cough medicine if you are losing sleep. Be aware that cough medicine can prevent your body's natural ability to remove mucus from your lungs.  · If you were prescribed an antibiotic medicine, take it as told by your health care provider. Do not stop taking the antibiotic even if you start to feel better.  General instructions  · Sleep in a semi-upright position at night. Try  sleeping in a reclining chair, or place a few pillows under your head.  · Rest as needed and get at least 8 hours of sleep each night.  · Drink enough water to keep your urine pale yellow. This will help to thin out mucus secretions in your lungs.  · Eat a healthy diet that includes plenty of vegetables, fruits, whole grains, low-fat dairy products, and lean protein.  · Do not use any products that contain nicotine or tobacco, such as cigarettes, e-cigarettes, and chewing tobacco. If you need help quitting, ask your health care provider.  · Keep all follow-up visits as told by your health care provider. This is important.  How is this prevented?  You can lower your risk of developing community-acquired pneumonia by:  · Getting a pneumococcal vaccine. There are different types and schedules of pneumococcal vaccines. Ask your health care provider which option is best for you. Consider getting the vaccine if:  ? You are older than 65 years of age.  ? You are older than 19 years of age and are undergoing cancer treatment, have chronic lung disease, or have other medical conditions that affect your immune system. Ask your health care provider if this applies to you.  · Getting an influenza vaccine every year. Ask your health care provider which type of vaccine is best for you.  · Getting regular checkups from your dentist.  · Washing your hands often. If soap and water are not available, use hand .  Contact a health care provider if:  · You have a fever.  · You are losing sleep because you cannot control your cough with cough medicine.  Get help right away if:  · You have worsening shortness of breath.  · You have increased chest pain.  · Your sickness becomes worse, especially if you are an older adult or have a weakened immune system.  · You cough up blood.  Summary  · Pneumonia is an infection of the lungs.  · Community-acquired pneumonia develops in people who have not been in the hospital. It can be caused  by bacteria, viruses, or fungi.  · This condition may be treated with antibiotics or antiviral medicines.  · Severe cases may require hospitalization, mechanical ventilation, and other procedures to drain fluid from the lungs.  This information is not intended to replace advice given to you by your health care provider. Make sure you discuss any questions you have with your health care provider.  Document Released: 12/18/2006 Document Revised: 08/15/2019 Document Reviewed: 08/15/2019  [employer] (now [employer]) Patient Education © 2020 [employer]

## 2020-07-07 ENCOUNTER — OUTSIDE FACILITY SERVICE (OUTPATIENT)
Dept: PULMONOLOGY | Facility: CLINIC | Age: 55
End: 2020-07-07

## 2020-07-07 PROCEDURE — 99222 1ST HOSP IP/OBS MODERATE 55: CPT | Performed by: INTERNAL MEDICINE

## 2020-07-07 NOTE — ED PROVIDER NOTES
Subjective   Patient is a 55-year-old gentleman who is previous history of right lower lobe pneumonia who presents the hospital with cough congestion weakness which is gotten progressively worse for least 1-2 white days.  He states he has not been around anybody sick and has not had any fevers.          Review of Systems   Constitutional: Negative for chills and fever.   HENT: Negative for congestion, ear pain and sinus pressure.    Eyes: Negative for photophobia, pain and visual disturbance.   Respiratory: Positive for cough and shortness of breath. Negative for chest tightness and wheezing.    Cardiovascular: Negative for chest pain and palpitations.   Gastrointestinal: Negative for abdominal pain, diarrhea and nausea.   Endocrine: Negative for cold intolerance and heat intolerance.   Genitourinary: Negative for difficulty urinating and urgency.   Musculoskeletal: Negative for arthralgias, joint swelling and myalgias.   Skin: Negative for color change and wound.   Neurological: Negative for dizziness and headaches.   Hematological: Negative for adenopathy. Does not bruise/bleed easily.   Psychiatric/Behavioral: Negative for agitation, behavioral problems, confusion and decreased concentration.       Past Medical History:   Diagnosis Date   • Autonomic neuropathy    • Chronic diarrhea    • Diabetes mellitus (CMS/HCC)    • Diabetic foot ulcer (CMS/HCC)    • Elevated cholesterol    • Lateral epicondylitis, right elbow    • Noncompliance    • Orthostatic hypotension    • Skin infection        No Known Allergies    Past Surgical History:   Procedure Laterality Date   • TONSILLECTOMY         Family History   Problem Relation Age of Onset   • Diabetes Mother    • Arthritis Mother    • Hyperlipidemia Mother    • Heart disease Father    • Diabetes Father    • Arthritis Father        Social History     Socioeconomic History   • Marital status:      Spouse name: Not on file   • Number of children: Not on file   •  Years of education: Not on file   • Highest education level: Not on file   Tobacco Use   • Smoking status: Never Smoker   Substance and Sexual Activity   • Alcohol use: No   • Drug use: No           Objective   Physical Exam   Constitutional: He is oriented to person, place, and time. He appears well-developed and well-nourished.   HENT:   Head: Normocephalic and atraumatic.   Mouth/Throat: Oropharynx is clear and moist.   Eyes: Pupils are equal, round, and reactive to light. EOM are normal.   Neck: Normal range of motion. Neck supple.   Cardiovascular: Normal rate, regular rhythm, normal heart sounds and intact distal pulses.   Pulmonary/Chest: Effort normal and breath sounds normal.   Rhonchi bilaterally   Abdominal: Soft. Bowel sounds are normal. There is no tenderness.   Musculoskeletal: Normal range of motion. He exhibits no edema.   Neurological: He is alert and oriented to person, place, and time. He has normal reflexes. No cranial nerve deficit.   Skin: Skin is warm and dry. No rash noted.   Psychiatric: He has a normal mood and affect. His behavior is normal. Thought content normal.   Nursing note and vitals reviewed.      Procedures           ED Course                                           MDM    Final diagnoses:   Pneumonia of right upper lobe due to infectious organism            Rickey Mcintyre MD  07/06/20 2002

## 2020-07-08 ENCOUNTER — OUTSIDE FACILITY SERVICE (OUTPATIENT)
Dept: PULMONOLOGY | Facility: CLINIC | Age: 55
End: 2020-07-08

## 2020-07-08 PROCEDURE — 99231 SBSQ HOSP IP/OBS SF/LOW 25: CPT | Performed by: INTERNAL MEDICINE

## 2020-07-10 ENCOUNTER — OUTSIDE FACILITY SERVICE (OUTPATIENT)
Dept: PULMONOLOGY | Facility: CLINIC | Age: 55
End: 2020-07-10

## 2020-07-10 PROCEDURE — 99233 SBSQ HOSP IP/OBS HIGH 50: CPT | Performed by: INTERNAL MEDICINE

## 2020-07-11 ENCOUNTER — OUTSIDE FACILITY SERVICE (OUTPATIENT)
Dept: PULMONOLOGY | Facility: CLINIC | Age: 55
End: 2020-07-11

## 2020-07-11 PROCEDURE — 99232 SBSQ HOSP IP/OBS MODERATE 35: CPT | Performed by: INTERNAL MEDICINE

## 2020-07-12 ENCOUNTER — OUTSIDE FACILITY SERVICE (OUTPATIENT)
Dept: PULMONOLOGY | Facility: CLINIC | Age: 55
End: 2020-07-12

## 2020-07-12 PROCEDURE — 99232 SBSQ HOSP IP/OBS MODERATE 35: CPT | Performed by: INTERNAL MEDICINE

## 2020-07-13 ENCOUNTER — OUTSIDE FACILITY SERVICE (OUTPATIENT)
Dept: PULMONOLOGY | Facility: CLINIC | Age: 55
End: 2020-07-13

## 2020-07-13 PROCEDURE — 99232 SBSQ HOSP IP/OBS MODERATE 35: CPT | Performed by: INTERNAL MEDICINE

## 2020-07-14 ENCOUNTER — OUTSIDE FACILITY SERVICE (OUTPATIENT)
Dept: PULMONOLOGY | Facility: CLINIC | Age: 55
End: 2020-07-14

## 2020-07-14 PROCEDURE — 99232 SBSQ HOSP IP/OBS MODERATE 35: CPT | Performed by: INTERNAL MEDICINE

## 2020-07-15 ENCOUNTER — OUTSIDE FACILITY SERVICE (OUTPATIENT)
Dept: PULMONOLOGY | Facility: CLINIC | Age: 55
End: 2020-07-15

## 2020-07-15 PROCEDURE — 99232 SBSQ HOSP IP/OBS MODERATE 35: CPT | Performed by: INTERNAL MEDICINE

## 2020-07-20 ENCOUNTER — LAB REQUISITION (OUTPATIENT)
Dept: LAB | Facility: HOSPITAL | Age: 55
End: 2020-07-20

## 2020-07-20 DIAGNOSIS — Z00.00 ENCOUNTER FOR GENERAL ADULT MEDICAL EXAMINATION WITHOUT ABNORMAL FINDINGS: ICD-10-CM

## 2020-07-20 LAB
ALBUMIN SERPL-MCNC: 4 G/DL (ref 3.5–5.2)
ALP SERPL-CCNC: 94 U/L (ref 39–117)
ALT SERPL W P-5'-P-CCNC: 25 U/L (ref 1–41)
ANION GAP SERPL CALCULATED.3IONS-SCNC: 14 MMOL/L (ref 5–15)
AST SERPL-CCNC: 16 U/L (ref 1–40)
BASOPHILS # BLD AUTO: 0.09 10*3/MM3 (ref 0–0.2)
BASOPHILS NFR BLD AUTO: 1.3 % (ref 0–1.5)
BILIRUB CONJ SERPL-MCNC: <0.2 MG/DL (ref 0–0.3)
BILIRUB INDIRECT SERPL-MCNC: NORMAL MG/DL
BILIRUB SERPL-MCNC: <0.2 MG/DL (ref 0–1.2)
BUN SERPL-MCNC: 26 MG/DL (ref 6–20)
BUN/CREAT SERPL: 31 (ref 7–25)
CALCIUM SPEC-SCNC: 9.4 MG/DL (ref 8.6–10.5)
CHLORIDE SERPL-SCNC: 103 MMOL/L (ref 98–107)
CHOLEST SERPL-MCNC: 165 MG/DL (ref 0–200)
CO2 SERPL-SCNC: 23 MMOL/L (ref 22–29)
CREAT SERPL-MCNC: 0.84 MG/DL (ref 0.76–1.27)
DEPRECATED RDW RBC AUTO: 49.7 FL (ref 37–54)
EOSINOPHIL # BLD AUTO: 0.26 10*3/MM3 (ref 0–0.4)
EOSINOPHIL NFR BLD AUTO: 3.9 % (ref 0.3–6.2)
ERYTHROCYTE [DISTWIDTH] IN BLOOD BY AUTOMATED COUNT: 14.9 % (ref 12.3–15.4)
GFR SERPL CREATININE-BSD FRML MDRD: 95 ML/MIN/1.73
GLUCOSE SERPL-MCNC: 328 MG/DL (ref 65–99)
HBA1C MFR BLD: 13.9 % (ref 4.8–5.6)
HCT VFR BLD AUTO: 32.8 % (ref 37.5–51)
HDLC SERPL-MCNC: 56 MG/DL (ref 40–60)
HGB BLD-MCNC: 10.1 G/DL (ref 13–17.7)
IMM GRANULOCYTES # BLD AUTO: 0.02 10*3/MM3 (ref 0–0.05)
IMM GRANULOCYTES NFR BLD AUTO: 0.3 % (ref 0–0.5)
LDLC SERPL CALC-MCNC: 61 MG/DL (ref 0–100)
LDLC/HDLC SERPL: 1.09 {RATIO}
LYMPHOCYTES # BLD AUTO: 1.87 10*3/MM3 (ref 0.7–3.1)
LYMPHOCYTES NFR BLD AUTO: 27.7 % (ref 19.6–45.3)
MCH RBC QN AUTO: 28.4 PG (ref 26.6–33)
MCHC RBC AUTO-ENTMCNC: 30.8 G/DL (ref 31.5–35.7)
MCV RBC AUTO: 92.1 FL (ref 79–97)
MONOCYTES # BLD AUTO: 0.36 10*3/MM3 (ref 0.1–0.9)
MONOCYTES NFR BLD AUTO: 5.3 % (ref 5–12)
NEUTROPHILS NFR BLD AUTO: 4.14 10*3/MM3 (ref 1.7–7)
NEUTROPHILS NFR BLD AUTO: 61.5 % (ref 42.7–76)
NRBC BLD AUTO-RTO: 0 /100 WBC (ref 0–0.2)
PLATELET # BLD AUTO: 342 10*3/MM3 (ref 140–450)
PMV BLD AUTO: 9.3 FL (ref 6–12)
POTASSIUM SERPL-SCNC: 5.1 MMOL/L (ref 3.5–5.2)
PROT SERPL-MCNC: 6.9 G/DL (ref 6–8.5)
RBC # BLD AUTO: 3.56 10*6/MM3 (ref 4.14–5.8)
SODIUM SERPL-SCNC: 140 MMOL/L (ref 136–145)
TRIGL SERPL-MCNC: 240 MG/DL (ref 0–150)
TSH SERPL DL<=0.05 MIU/L-ACNC: 1.99 UIU/ML (ref 0.27–4.2)
VLDLC SERPL-MCNC: 48 MG/DL
WBC # BLD AUTO: 6.74 10*3/MM3 (ref 3.4–10.8)

## 2020-07-20 PROCEDURE — 80048 BASIC METABOLIC PNL TOTAL CA: CPT | Performed by: INTERNAL MEDICINE

## 2020-07-20 PROCEDURE — 84134 ASSAY OF PREALBUMIN: CPT | Performed by: INTERNAL MEDICINE

## 2020-07-20 PROCEDURE — 82607 VITAMIN B-12: CPT | Performed by: INTERNAL MEDICINE

## 2020-07-20 PROCEDURE — 84443 ASSAY THYROID STIM HORMONE: CPT | Performed by: INTERNAL MEDICINE

## 2020-07-20 PROCEDURE — 80076 HEPATIC FUNCTION PANEL: CPT | Performed by: INTERNAL MEDICINE

## 2020-07-20 PROCEDURE — 83036 HEMOGLOBIN GLYCOSYLATED A1C: CPT | Performed by: INTERNAL MEDICINE

## 2020-07-20 PROCEDURE — 36415 COLL VENOUS BLD VENIPUNCTURE: CPT | Performed by: INTERNAL MEDICINE

## 2020-07-20 PROCEDURE — 85025 COMPLETE CBC W/AUTO DIFF WBC: CPT | Performed by: INTERNAL MEDICINE

## 2020-07-20 PROCEDURE — 80061 LIPID PANEL: CPT | Performed by: INTERNAL MEDICINE

## 2020-07-21 LAB
PREALB SERPL-MCNC: 34.9 MG/DL (ref 20–40)
VIT B12 BLD-MCNC: 389 PG/ML (ref 211–946)

## 2021-01-01 ENCOUNTER — APPOINTMENT (OUTPATIENT)
Dept: GENERAL RADIOLOGY | Facility: HOSPITAL | Age: 56
End: 2021-01-01

## 2021-01-01 ENCOUNTER — APPOINTMENT (OUTPATIENT)
Dept: CT IMAGING | Facility: HOSPITAL | Age: 56
End: 2021-01-01

## 2021-01-01 ENCOUNTER — APPOINTMENT (OUTPATIENT)
Dept: NEUROLOGY | Facility: HOSPITAL | Age: 56
End: 2021-01-01

## 2021-01-01 ENCOUNTER — APPOINTMENT (OUTPATIENT)
Dept: ULTRASOUND IMAGING | Facility: HOSPITAL | Age: 56
End: 2021-01-01

## 2021-01-01 ENCOUNTER — APPOINTMENT (OUTPATIENT)
Dept: CARDIOLOGY | Facility: HOSPITAL | Age: 56
End: 2021-01-01

## 2021-01-01 ENCOUNTER — APPOINTMENT (OUTPATIENT)
Dept: MRI IMAGING | Facility: HOSPITAL | Age: 56
End: 2021-01-01

## 2021-01-01 ENCOUNTER — HOSPITAL ENCOUNTER (INPATIENT)
Facility: HOSPITAL | Age: 56
LOS: 18 days | End: 2021-08-03
Attending: EMERGENCY MEDICINE | Admitting: INTERNAL MEDICINE

## 2021-01-01 VITALS
OXYGEN SATURATION: 23 % | HEIGHT: 75 IN | TEMPERATURE: 100.9 F | BODY MASS INDEX: 23.09 KG/M2 | RESPIRATION RATE: 29 BRPM | SYSTOLIC BLOOD PRESSURE: 110 MMHG | DIASTOLIC BLOOD PRESSURE: 51 MMHG | WEIGHT: 185.7 LBS

## 2021-01-01 DIAGNOSIS — I46.9 CARDIAC ARREST (HCC): Primary | ICD-10-CM

## 2021-01-01 LAB
ABO GROUP BLD: NORMAL
ALBUMIN SERPL-MCNC: 1.7 G/DL (ref 3.5–5.2)
ALBUMIN SERPL-MCNC: 1.9 G/DL (ref 3.5–5.2)
ALBUMIN SERPL-MCNC: 2 G/DL (ref 3.5–5.2)
ALBUMIN SERPL-MCNC: 2 G/DL (ref 3.5–5.2)
ALBUMIN SERPL-MCNC: 2.1 G/DL (ref 3.5–5.2)
ALBUMIN SERPL-MCNC: 2.2 G/DL (ref 3.5–5.2)
ALBUMIN SERPL-MCNC: 2.4 G/DL (ref 3.5–5.2)
ALBUMIN SERPL-MCNC: 2.5 G/DL (ref 3.5–5.2)
ALBUMIN SERPL-MCNC: 2.7 G/DL (ref 3.5–5.2)
ALBUMIN SERPL-MCNC: 2.8 G/DL (ref 3.5–5.2)
ALBUMIN SERPL-MCNC: 3.1 G/DL (ref 3.5–5.2)
ALBUMIN SERPL-MCNC: 3.7 G/DL (ref 3.5–5.2)
ALBUMIN/GLOB SERPL: 0.5 G/DL
ALBUMIN/GLOB SERPL: 0.5 G/DL
ALBUMIN/GLOB SERPL: 0.6 G/DL
ALBUMIN/GLOB SERPL: 0.7 G/DL
ALBUMIN/GLOB SERPL: 0.7 G/DL
ALBUMIN/GLOB SERPL: 0.8 G/DL
ALBUMIN/GLOB SERPL: 0.9 G/DL
ALBUMIN/GLOB SERPL: 1.1 G/DL
ALBUMIN/GLOB SERPL: 1.2 G/DL
ALP SERPL-CCNC: 112 U/L (ref 39–117)
ALP SERPL-CCNC: 123 U/L (ref 39–117)
ALP SERPL-CCNC: 137 U/L (ref 39–117)
ALP SERPL-CCNC: 231 U/L (ref 39–117)
ALP SERPL-CCNC: 285 U/L (ref 39–117)
ALP SERPL-CCNC: 350 U/L (ref 39–117)
ALP SERPL-CCNC: 413 U/L (ref 39–117)
ALP SERPL-CCNC: 426 U/L (ref 39–117)
ALP SERPL-CCNC: 555 U/L (ref 39–117)
ALP SERPL-CCNC: 639 U/L (ref 39–117)
ALP SERPL-CCNC: 656 U/L (ref 39–117)
ALP SERPL-CCNC: 95 U/L (ref 39–117)
ALT SERPL W P-5'-P-CCNC: 11 U/L (ref 1–41)
ALT SERPL W P-5'-P-CCNC: 119 U/L (ref 1–41)
ALT SERPL W P-5'-P-CCNC: 12 U/L (ref 1–41)
ALT SERPL W P-5'-P-CCNC: 12 U/L (ref 1–41)
ALT SERPL W P-5'-P-CCNC: 14 U/L (ref 1–41)
ALT SERPL W P-5'-P-CCNC: 22 U/L (ref 1–41)
ALT SERPL W P-5'-P-CCNC: 237 U/L (ref 1–41)
ALT SERPL W P-5'-P-CCNC: 282 U/L (ref 1–41)
ALT SERPL W P-5'-P-CCNC: 30 U/L (ref 1–41)
ALT SERPL W P-5'-P-CCNC: 35 U/L (ref 1–41)
ALT SERPL W P-5'-P-CCNC: 40 U/L (ref 1–41)
ALT SERPL W P-5'-P-CCNC: 82 U/L (ref 1–41)
AMMONIA BLD-SCNC: 13 UMOL/L (ref 16–60)
AMPHET+METHAMPHET UR QL: NEGATIVE
AMPHETAMINES UR QL: NEGATIVE
ANION GAP SERPL CALCULATED.3IONS-SCNC: 10 MMOL/L (ref 5–15)
ANION GAP SERPL CALCULATED.3IONS-SCNC: 11 MMOL/L (ref 5–15)
ANION GAP SERPL CALCULATED.3IONS-SCNC: 12 MMOL/L (ref 5–15)
ANION GAP SERPL CALCULATED.3IONS-SCNC: 12 MMOL/L (ref 5–15)
ANION GAP SERPL CALCULATED.3IONS-SCNC: 13 MMOL/L (ref 5–15)
ANION GAP SERPL CALCULATED.3IONS-SCNC: 13 MMOL/L (ref 5–15)
ANION GAP SERPL CALCULATED.3IONS-SCNC: 14 MMOL/L (ref 5–15)
ANION GAP SERPL CALCULATED.3IONS-SCNC: 15 MMOL/L (ref 5–15)
ANION GAP SERPL CALCULATED.3IONS-SCNC: 15 MMOL/L (ref 5–15)
ANION GAP SERPL CALCULATED.3IONS-SCNC: 19 MMOL/L (ref 5–15)
ANION GAP SERPL CALCULATED.3IONS-SCNC: 8 MMOL/L (ref 5–15)
ANION GAP SERPL CALCULATED.3IONS-SCNC: 9 MMOL/L (ref 5–15)
ANISOCYTOSIS BLD QL: ABNORMAL
APTT PPP: 101.2 SECONDS (ref 24.1–35)
APTT PPP: 105.7 SECONDS (ref 24.1–35)
APTT PPP: 178.2 SECONDS (ref 24.1–35)
APTT PPP: 47.8 SECONDS (ref 24.1–35)
APTT PPP: 53.9 SECONDS (ref 24.1–35)
APTT PPP: 55.8 SECONDS (ref 24.1–35)
APTT PPP: 56 SECONDS (ref 24.1–35)
APTT PPP: 57.2 SECONDS (ref 24.1–35)
APTT PPP: 58.5 SECONDS (ref 24.1–35)
APTT PPP: 63.4 SECONDS (ref 24.1–35)
APTT PPP: 66 SECONDS (ref 24.1–35)
APTT PPP: 68.2 SECONDS (ref 24.1–35)
APTT PPP: 70.1 SECONDS (ref 24.1–35)
APTT PPP: 78.7 SECONDS (ref 24.1–35)
APTT PPP: 85.7 SECONDS (ref 24.1–35)
APTT PPP: 85.9 SECONDS (ref 24.1–35)
APTT PPP: 90.5 SECONDS (ref 24.1–35)
ARTERIAL PATENCY WRIST A: ABNORMAL
ARTERIAL PATENCY WRIST A: NORMAL
ARTERIAL PATENCY WRIST A: NORMAL
ARTERIAL PATENCY WRIST A: POSITIVE
AST SERPL-CCNC: 12 U/L (ref 1–40)
AST SERPL-CCNC: 17 U/L (ref 1–40)
AST SERPL-CCNC: 20 U/L (ref 1–40)
AST SERPL-CCNC: 214 U/L (ref 1–40)
AST SERPL-CCNC: 22 U/L (ref 1–40)
AST SERPL-CCNC: 24 U/L (ref 1–40)
AST SERPL-CCNC: 33 U/L (ref 1–40)
AST SERPL-CCNC: 35 U/L (ref 1–40)
AST SERPL-CCNC: 355 U/L (ref 1–40)
AST SERPL-CCNC: 37 U/L (ref 1–40)
AST SERPL-CCNC: 40 U/L (ref 1–40)
AST SERPL-CCNC: 52 U/L (ref 1–40)
ATMOSPHERIC PRESS: 749 MMHG
ATMOSPHERIC PRESS: 750 MMHG
ATMOSPHERIC PRESS: 751 MMHG
ATMOSPHERIC PRESS: 752 MMHG
ATMOSPHERIC PRESS: 753 MMHG
ATMOSPHERIC PRESS: 753 MMHG
ATMOSPHERIC PRESS: NORMAL MM[HG]
BACTERIA SPEC AEROBE CULT: ABNORMAL
BACTERIA SPEC AEROBE CULT: NORMAL
BACTERIA SPEC RESP CULT: ABNORMAL
BACTERIA UR QL AUTO: ABNORMAL /HPF
BARBITURATES UR QL SCN: NEGATIVE
BASE EXCESS BLDA CALC-SCNC: -0.1 MMOL/L (ref 0–2)
BASE EXCESS BLDA CALC-SCNC: -0.2 MMOL/L (ref 0–2)
BASE EXCESS BLDA CALC-SCNC: -1.1 MMOL/L (ref 0–2)
BASE EXCESS BLDA CALC-SCNC: -2.1 MMOL/L (ref 0–2)
BASE EXCESS BLDA CALC-SCNC: -2.8 MMOL/L (ref 0–2)
BASE EXCESS BLDA CALC-SCNC: -3.4 MMOL/L (ref 0–2)
BASE EXCESS BLDA CALC-SCNC: -3.6 MMOL/L (ref 0–2)
BASE EXCESS BLDA CALC-SCNC: -4.2 MMOL/L (ref 0–2)
BASE EXCESS BLDA CALC-SCNC: -4.3 MMOL/L (ref 0–2)
BASE EXCESS BLDA CALC-SCNC: -4.4 MMOL/L (ref 0–2)
BASE EXCESS BLDA CALC-SCNC: -5.2 MMOL/L (ref 0–2)
BASE EXCESS BLDA CALC-SCNC: -5.7 MMOL/L (ref 0–2)
BASE EXCESS BLDA CALC-SCNC: -5.9 MMOL/L (ref 0–2)
BASE EXCESS BLDA CALC-SCNC: -6.1 MMOL/L (ref 0–2)
BASE EXCESS BLDA CALC-SCNC: -6.7 MMOL/L (ref 0–2)
BASE EXCESS BLDA CALC-SCNC: 0.3 MMOL/L (ref 0–2)
BASE EXCESS BLDA CALC-SCNC: 0.4 MMOL/L (ref 0–2)
BASE EXCESS BLDA CALC-SCNC: 0.8 MMOL/L (ref 0–2)
BASE EXCESS BLDA CALC-SCNC: 1.2 MMOL/L (ref 0–2)
BASE EXCESS BLDA CALC-SCNC: 1.8 MMOL/L (ref 0–2)
BASE EXCESS BLDA CALC-SCNC: 2.6 MMOL/L (ref 0–2)
BASE EXCESS BLDA CALC-SCNC: 3.9 MMOL/L (ref 0–2)
BASE EXCESS BLDA CALC-SCNC: 4 MMOL/L (ref 0–2)
BASE EXCESS BLDA CALC-SCNC: 4.5 MMOL/L (ref 0–2)
BASE EXCESS BLDA CALC-SCNC: 4.7 MMOL/L (ref 0–2)
BASE EXCESS BLDA CALC-SCNC: 4.8 MMOL/L (ref 0–2)
BASE EXCESS BLDA CALC-SCNC: 5.1 MMOL/L (ref 0–2)
BASE EXCESS BLDA CALC-SCNC: 5.4 MMOL/L (ref 0–2)
BASE EXCESS BLDA CALC-SCNC: 6.7 MMOL/L (ref 0–2)
BASE EXCESS BLDA CALC-SCNC: NORMAL MMOL/L
BASOPHILS # BLD AUTO: 0.03 10*3/MM3 (ref 0–0.2)
BASOPHILS # BLD AUTO: 0.05 10*3/MM3 (ref 0–0.2)
BASOPHILS # BLD AUTO: 0.05 10*3/MM3 (ref 0–0.2)
BASOPHILS # BLD AUTO: 0.07 10*3/MM3 (ref 0–0.2)
BASOPHILS # BLD AUTO: 0.13 10*3/MM3 (ref 0–0.2)
BASOPHILS NFR BLD AUTO: 0.3 % (ref 0–1.5)
BASOPHILS NFR BLD AUTO: 0.3 % (ref 0–1.5)
BASOPHILS NFR BLD AUTO: 0.4 % (ref 0–1.5)
BASOPHILS NFR BLD AUTO: 0.4 % (ref 0–1.5)
BASOPHILS NFR BLD AUTO: 0.5 % (ref 0–1.5)
BDY SITE: ABNORMAL
BDY SITE: NORMAL
BDY SITE: NORMAL
BENZODIAZ UR QL SCN: NEGATIVE
BH BB BLOOD EXPIRATION DATE: NORMAL
BH BB BLOOD TYPE BARCODE: 6200
BH BB DISPENSE STATUS: NORMAL
BH BB PRODUCT CODE: NORMAL
BH BB UNIT NUMBER: NORMAL
BH CV ECHO MEAS - AO MAX PG (FULL): 0.18 MMHG
BH CV ECHO MEAS - AO MAX PG (FULL): 1 MMHG
BH CV ECHO MEAS - AO MAX PG: 3.2 MMHG
BH CV ECHO MEAS - AO MAX PG: 5.3 MMHG
BH CV ECHO MEAS - AO MEAN PG (FULL): 0 MMHG
BH CV ECHO MEAS - AO MEAN PG (FULL): 1 MMHG
BH CV ECHO MEAS - AO MEAN PG: 2 MMHG
BH CV ECHO MEAS - AO MEAN PG: 3 MMHG
BH CV ECHO MEAS - AO ROOT AREA (BSA CORRECTED): 1.9
BH CV ECHO MEAS - AO ROOT AREA: 10.8 CM^2
BH CV ECHO MEAS - AO ROOT DIAM: 3.7 CM
BH CV ECHO MEAS - AO V2 MAX: 115 CM/SEC
BH CV ECHO MEAS - AO V2 MAX: 88.9 CM/SEC
BH CV ECHO MEAS - AO V2 MEAN: 59.2 CM/SEC
BH CV ECHO MEAS - AO V2 MEAN: 89.7 CM/SEC
BH CV ECHO MEAS - AO V2 VTI: 17.9 CM
BH CV ECHO MEAS - AO V2 VTI: 22.2 CM
BH CV ECHO MEAS - AVA(I,A): 2.1 CM^2
BH CV ECHO MEAS - AVA(I,D): 2.1 CM^2
BH CV ECHO MEAS - AVA(V,A): 1.9 CM^2
BH CV ECHO MEAS - AVA(V,D): 1.9 CM^2
BH CV ECHO MEAS - BSA(HAYCOCK): 1.9 M^2
BH CV ECHO MEAS - BSA(HAYCOCK): 2 M^2
BH CV ECHO MEAS - BSA: 2 M^2
BH CV ECHO MEAS - BSA: 2.1 M^2
BH CV ECHO MEAS - BZI_BMI: 19.9 KILOGRAMS/M^2
BH CV ECHO MEAS - BZI_BMI: 21.7 KILOGRAMS/M^2
BH CV ECHO MEAS - BZI_METRIC_HEIGHT: 190.5 CM
BH CV ECHO MEAS - BZI_METRIC_HEIGHT: 190.5 CM
BH CV ECHO MEAS - BZI_METRIC_WEIGHT: 72.1 KG
BH CV ECHO MEAS - BZI_METRIC_WEIGHT: 78.9 KG
BH CV ECHO MEAS - EDV(CUBED): 46.7 ML
BH CV ECHO MEAS - EDV(CUBED): 83.5 ML
BH CV ECHO MEAS - EDV(MOD-SP2): 108 ML
BH CV ECHO MEAS - EDV(MOD-SP2): 80.9 ML
BH CV ECHO MEAS - EDV(MOD-SP4): 107 ML
BH CV ECHO MEAS - EDV(MOD-SP4): 95.6 ML
BH CV ECHO MEAS - EDV(TEICH): 54.4 ML
BH CV ECHO MEAS - EDV(TEICH): 86.3 ML
BH CV ECHO MEAS - EF(CUBED): 49.1 %
BH CV ECHO MEAS - EF(CUBED): 73.4 %
BH CV ECHO MEAS - EF(MOD-SP2): 39.8 %
BH CV ECHO MEAS - EF(MOD-SP2): 70.5 %
BH CV ECHO MEAS - EF(MOD-SP4): 66 %
BH CV ECHO MEAS - EF(MOD-SP4): 70.3 %
BH CV ECHO MEAS - EF(TEICH): 42.1 %
BH CV ECHO MEAS - EF(TEICH): 65.5 %
BH CV ECHO MEAS - ESV(CUBED): 22.2 ML
BH CV ECHO MEAS - ESV(CUBED): 23.8 ML
BH CV ECHO MEAS - ESV(MOD-SP2): 31.9 ML
BH CV ECHO MEAS - ESV(MOD-SP2): 48.7 ML
BH CV ECHO MEAS - ESV(MOD-SP4): 28.4 ML
BH CV ECHO MEAS - ESV(MOD-SP4): 36.4 ML
BH CV ECHO MEAS - ESV(TEICH): 29.8 ML
BH CV ECHO MEAS - ESV(TEICH): 31.5 ML
BH CV ECHO MEAS - FS: 20.1 %
BH CV ECHO MEAS - FS: 35.7 %
BH CV ECHO MEAS - IVS/LVPW: 0.91
BH CV ECHO MEAS - IVS/LVPW: 1.1
BH CV ECHO MEAS - IVSD: 0.92 CM
BH CV ECHO MEAS - IVSD: 0.96 CM
BH CV ECHO MEAS - LA DIMENSION: 2.4 CM
BH CV ECHO MEAS - LA/AO: 0.65
BH CV ECHO MEAS - LAT PEAK E' VEL: 10 CM/SEC
BH CV ECHO MEAS - LV DIASTOLIC VOL/BSA (35-75): 46.2 ML/M^2
BH CV ECHO MEAS - LV DIASTOLIC VOL/BSA (35-75): 53.8 ML/M^2
BH CV ECHO MEAS - LV MASS(C)D: 139.1 GRAMS
BH CV ECHO MEAS - LV MASS(C)D: 95.5 GRAMS
BH CV ECHO MEAS - LV MASS(C)DI: 48 GRAMS/M^2
BH CV ECHO MEAS - LV MASS(C)DI: 67.3 GRAMS/M^2
BH CV ECHO MEAS - LV MAX PG: 2.1 MMHG
BH CV ECHO MEAS - LV MAX PG: 5.1 MMHG
BH CV ECHO MEAS - LV MEAN PG: 1 MMHG
BH CV ECHO MEAS - LV MEAN PG: 3 MMHG
BH CV ECHO MEAS - LV SYSTOLIC VOL/BSA (12-30): 13.7 ML/M^2
BH CV ECHO MEAS - LV SYSTOLIC VOL/BSA (12-30): 18.3 ML/M^2
BH CV ECHO MEAS - LV V1 MAX: 113 CM/SEC
BH CV ECHO MEAS - LV V1 MAX: 73.1 CM/SEC
BH CV ECHO MEAS - LV V1 MEAN: 48.1 CM/SEC
BH CV ECHO MEAS - LV V1 MEAN: 80.1 CM/SEC
BH CV ECHO MEAS - LV V1 VTI: 16.7 CM
BH CV ECHO MEAS - LV V1 VTI: 20.8 CM
BH CV ECHO MEAS - LVIDD: 3.6 CM
BH CV ECHO MEAS - LVIDD: 4.4 CM
BH CV ECHO MEAS - LVIDS: 2.8 CM
BH CV ECHO MEAS - LVIDS: 2.9 CM
BH CV ECHO MEAS - LVLD AP2: 8.1 CM
BH CV ECHO MEAS - LVLD AP2: 9.1 CM
BH CV ECHO MEAS - LVLD AP4: 8.5 CM
BH CV ECHO MEAS - LVLD AP4: 8.5 CM
BH CV ECHO MEAS - LVLS AP2: 8.1 CM
BH CV ECHO MEAS - LVLS AP2: 8.3 CM
BH CV ECHO MEAS - LVLS AP4: 6.9 CM
BH CV ECHO MEAS - LVLS AP4: 7.2 CM
BH CV ECHO MEAS - LVOT AREA (M): 2.3 CM^2
BH CV ECHO MEAS - LVOT AREA: 2.3 CM^2
BH CV ECHO MEAS - LVOT DIAM: 1.7 CM
BH CV ECHO MEAS - LVPWD: 0.88 CM
BH CV ECHO MEAS - LVPWD: 1 CM
BH CV ECHO MEAS - MED PEAK E' VEL: 11.6 CM/SEC
BH CV ECHO MEAS - MR MAX PG: 30.6 MMHG
BH CV ECHO MEAS - MR MAX VEL: 264 CM/SEC
BH CV ECHO MEAS - MV DEC TIME: 0.17 SEC
BH CV ECHO MEAS - MV E MAX VEL: 108 CM/SEC
BH CV ECHO MEAS - SI(AO): 96.7 ML/M^2
BH CV ECHO MEAS - SI(CUBED): 11.5 ML/M^2
BH CV ECHO MEAS - SI(CUBED): 29.6 ML/M^2
BH CV ECHO MEAS - SI(LVOT): 19 ML/M^2
BH CV ECHO MEAS - SI(MOD-SP2): 15.6 ML/M^2
BH CV ECHO MEAS - SI(MOD-SP2): 38.2 ML/M^2
BH CV ECHO MEAS - SI(MOD-SP4): 32.5 ML/M^2
BH CV ECHO MEAS - SI(MOD-SP4): 35.5 ML/M^2
BH CV ECHO MEAS - SI(TEICH): 11.5 ML/M^2
BH CV ECHO MEAS - SI(TEICH): 27.3 ML/M^2
BH CV ECHO MEAS - SV(AO): 192.5 ML
BH CV ECHO MEAS - SV(CUBED): 22.9 ML
BH CV ECHO MEAS - SV(CUBED): 61.3 ML
BH CV ECHO MEAS - SV(LVOT): 37.9 ML
BH CV ECHO MEAS - SV(MOD-SP2): 32.2 ML
BH CV ECHO MEAS - SV(MOD-SP2): 76.1 ML
BH CV ECHO MEAS - SV(MOD-SP4): 67.2 ML
BH CV ECHO MEAS - SV(MOD-SP4): 70.6 ML
BH CV ECHO MEAS - SV(TEICH): 22.9 ML
BH CV ECHO MEAS - SV(TEICH): 56.5 ML
BH CV ECHO MEAS - TR MAX VEL: 213 CM/SEC
BH CV ECHO MEASUREMENTS AVERAGE E/E' RATIO: 10
BILIRUB SERPL-MCNC: 0.2 MG/DL (ref 0–1.2)
BILIRUB SERPL-MCNC: 0.3 MG/DL (ref 0–1.2)
BILIRUB SERPL-MCNC: 0.4 MG/DL (ref 0–1.2)
BILIRUB SERPL-MCNC: <0.2 MG/DL (ref 0–1.2)
BILIRUB UR QL STRIP: NEGATIVE
BLD GP AB SCN SERPL QL: NEGATIVE
BODY TEMPERATURE: 34.3 C
BODY TEMPERATURE: 34.4 C
BODY TEMPERATURE: 37 C
BODY TEMPERATURE: NORMAL
BUN SERPL-MCNC: 13 MG/DL (ref 6–20)
BUN SERPL-MCNC: 13 MG/DL (ref 6–20)
BUN SERPL-MCNC: 15 MG/DL (ref 6–20)
BUN SERPL-MCNC: 15 MG/DL (ref 6–20)
BUN SERPL-MCNC: 21 MG/DL (ref 6–20)
BUN SERPL-MCNC: 22 MG/DL (ref 6–20)
BUN SERPL-MCNC: 30 MG/DL (ref 6–20)
BUN SERPL-MCNC: 30 MG/DL (ref 6–20)
BUN SERPL-MCNC: 32 MG/DL (ref 6–20)
BUN SERPL-MCNC: 33 MG/DL (ref 6–20)
BUN SERPL-MCNC: 34 MG/DL (ref 6–20)
BUN SERPL-MCNC: 37 MG/DL (ref 6–20)
BUN SERPL-MCNC: 43 MG/DL (ref 6–20)
BUN SERPL-MCNC: 45 MG/DL (ref 6–20)
BUN SERPL-MCNC: 52 MG/DL (ref 6–20)
BUN SERPL-MCNC: 57 MG/DL (ref 6–20)
BUN SERPL-MCNC: 58 MG/DL (ref 6–20)
BUN SERPL-MCNC: 64 MG/DL (ref 6–20)
BUN SERPL-MCNC: 71 MG/DL (ref 6–20)
BUN SERPL-MCNC: 71 MG/DL (ref 6–20)
BUN SERPL-MCNC: 89 MG/DL (ref 6–20)
BUN/CREAT SERPL: 10.4 (ref 7–25)
BUN/CREAT SERPL: 10.8 (ref 7–25)
BUN/CREAT SERPL: 12.4 (ref 7–25)
BUN/CREAT SERPL: 12.4 (ref 7–25)
BUN/CREAT SERPL: 13.6 (ref 7–25)
BUN/CREAT SERPL: 14.5 (ref 7–25)
BUN/CREAT SERPL: 16 (ref 7–25)
BUN/CREAT SERPL: 16.4 (ref 7–25)
BUN/CREAT SERPL: 16.4 (ref 7–25)
BUN/CREAT SERPL: 17.2 (ref 7–25)
BUN/CREAT SERPL: 17.3 (ref 7–25)
BUN/CREAT SERPL: 17.5 (ref 7–25)
BUN/CREAT SERPL: 17.6 (ref 7–25)
BUN/CREAT SERPL: 17.9 (ref 7–25)
BUN/CREAT SERPL: 18.1 (ref 7–25)
BUN/CREAT SERPL: 19.1 (ref 7–25)
BUN/CREAT SERPL: 26.3 (ref 7–25)
BUN/CREAT SERPL: 27.3 (ref 7–25)
BUN/CREAT SERPL: 8.5 (ref 7–25)
BUN/CREAT SERPL: 9.1 (ref 7–25)
BUN/CREAT SERPL: 9.9 (ref 7–25)
BUPRENORPHINE SERPL-MCNC: NEGATIVE NG/ML
BURR CELLS BLD QL SMEAR: ABNORMAL
CALCIUM SPEC-SCNC: 7.8 MG/DL (ref 8.6–10.5)
CALCIUM SPEC-SCNC: 7.9 MG/DL (ref 8.6–10.5)
CALCIUM SPEC-SCNC: 8.2 MG/DL (ref 8.6–10.5)
CALCIUM SPEC-SCNC: 8.2 MG/DL (ref 8.6–10.5)
CALCIUM SPEC-SCNC: 8.3 MG/DL (ref 8.6–10.5)
CALCIUM SPEC-SCNC: 8.4 MG/DL (ref 8.6–10.5)
CALCIUM SPEC-SCNC: 8.5 MG/DL (ref 8.6–10.5)
CALCIUM SPEC-SCNC: 8.5 MG/DL (ref 8.6–10.5)
CALCIUM SPEC-SCNC: 8.6 MG/DL (ref 8.6–10.5)
CALCIUM SPEC-SCNC: 8.7 MG/DL (ref 8.6–10.5)
CALCIUM SPEC-SCNC: 8.8 MG/DL (ref 8.6–10.5)
CALCIUM SPEC-SCNC: 9.2 MG/DL (ref 8.6–10.5)
CANNABINOIDS SERPL QL: NEGATIVE
CHLORIDE SERPL-SCNC: 100 MMOL/L (ref 98–107)
CHLORIDE SERPL-SCNC: 101 MMOL/L (ref 98–107)
CHLORIDE SERPL-SCNC: 102 MMOL/L (ref 98–107)
CHLORIDE SERPL-SCNC: 103 MMOL/L (ref 98–107)
CHLORIDE SERPL-SCNC: 104 MMOL/L (ref 98–107)
CHLORIDE SERPL-SCNC: 106 MMOL/L (ref 98–107)
CHLORIDE SERPL-SCNC: 107 MMOL/L (ref 98–107)
CHLORIDE SERPL-SCNC: 91 MMOL/L (ref 98–107)
CHLORIDE SERPL-SCNC: 94 MMOL/L (ref 98–107)
CHLORIDE SERPL-SCNC: 95 MMOL/L (ref 98–107)
CHLORIDE SERPL-SCNC: 97 MMOL/L (ref 98–107)
CHLORIDE SERPL-SCNC: 98 MMOL/L (ref 98–107)
CHLORIDE SERPL-SCNC: 99 MMOL/L (ref 98–107)
CHLORIDE SERPL-SCNC: 99 MMOL/L (ref 98–107)
CK SERPL-CCNC: 121 U/L (ref 20–200)
CK SERPL-CCNC: 1272 U/L (ref 20–200)
CK SERPL-CCNC: 1719 U/L (ref 20–200)
CK SERPL-CCNC: 76 U/L (ref 20–200)
CLARITY UR: ABNORMAL
CO2 SERPL-SCNC: 18 MMOL/L (ref 22–29)
CO2 SERPL-SCNC: 19 MMOL/L (ref 22–29)
CO2 SERPL-SCNC: 20 MMOL/L (ref 22–29)
CO2 SERPL-SCNC: 21 MMOL/L (ref 22–29)
CO2 SERPL-SCNC: 21 MMOL/L (ref 22–29)
CO2 SERPL-SCNC: 22 MMOL/L (ref 22–29)
CO2 SERPL-SCNC: 23 MMOL/L (ref 22–29)
CO2 SERPL-SCNC: 24 MMOL/L (ref 22–29)
CO2 SERPL-SCNC: 25 MMOL/L (ref 22–29)
CO2 SERPL-SCNC: 26 MMOL/L (ref 22–29)
CO2 SERPL-SCNC: 27 MMOL/L (ref 22–29)
CO2 SERPL-SCNC: 28 MMOL/L (ref 22–29)
CO2 SERPL-SCNC: 28 MMOL/L (ref 22–29)
COCAINE UR QL: NEGATIVE
COD CRY URNS QL: ABNORMAL /HPF
COLOR UR: ABNORMAL
COLOR UR: YELLOW
CREAT SERPL-MCNC: 0.68 MG/DL (ref 0.76–1.27)
CREAT SERPL-MCNC: 0.75 MG/DL (ref 0.76–1.27)
CREAT SERPL-MCNC: 0.8 MG/DL (ref 0.76–1.27)
CREAT SERPL-MCNC: 0.83 MG/DL (ref 0.76–1.27)
CREAT SERPL-MCNC: 0.85 MG/DL (ref 0.76–1.27)
CREAT SERPL-MCNC: 1.1 MG/DL (ref 0.76–1.27)
CREAT SERPL-MCNC: 1.28 MG/DL (ref 0.76–1.27)
CREAT SERPL-MCNC: 2.2 MG/DL (ref 0.76–1.27)
CREAT SERPL-MCNC: 2.66 MG/DL (ref 0.76–1.27)
CREAT SERPL-MCNC: 3.43 MG/DL (ref 0.76–1.27)
CREAT SERPL-MCNC: 3.43 MG/DL (ref 0.76–1.27)
CREAT SERPL-MCNC: 3.57 MG/DL (ref 0.76–1.27)
CREAT SERPL-MCNC: 3.58 MG/DL (ref 0.76–1.27)
CREAT SERPL-MCNC: 3.75 MG/DL (ref 0.76–1.27)
CREAT SERPL-MCNC: 4 MG/DL (ref 0.76–1.27)
CREAT SERPL-MCNC: 4.19 MG/DL (ref 0.76–1.27)
CREAT SERPL-MCNC: 4.34 MG/DL (ref 0.76–1.27)
CREAT SERPL-MCNC: 4.72 MG/DL (ref 0.76–1.27)
CREAT SERPL-MCNC: 5.1 MG/DL (ref 0.76–1.27)
CREAT UR-MCNC: 130.8 MG/DL
CROSSMATCH INTERPRETATION: NORMAL
CRP SERPL-MCNC: 33.76 MG/DL (ref 0–0.5)
CRP SERPL-MCNC: 4.68 MG/DL (ref 0–0.5)
D-LACTATE SERPL-SCNC: 2.7 MMOL/L (ref 0.5–2)
D-LACTATE SERPL-SCNC: 4.1 MMOL/L (ref 0.5–2)
D-LACTATE SERPL-SCNC: 8.1 MMOL/L (ref 0.5–2)
DEPRECATED RDW RBC AUTO: 42.2 FL (ref 37–54)
DEPRECATED RDW RBC AUTO: 42.9 FL (ref 37–54)
DEPRECATED RDW RBC AUTO: 43.8 FL (ref 37–54)
DEPRECATED RDW RBC AUTO: 43.8 FL (ref 37–54)
DEPRECATED RDW RBC AUTO: 44 FL (ref 37–54)
DEPRECATED RDW RBC AUTO: 44.8 FL (ref 37–54)
DEPRECATED RDW RBC AUTO: 46.3 FL (ref 37–54)
DEPRECATED RDW RBC AUTO: 47.9 FL (ref 37–54)
DEPRECATED RDW RBC AUTO: 48.8 FL (ref 37–54)
DEPRECATED RDW RBC AUTO: 48.8 FL (ref 37–54)
DEPRECATED RDW RBC AUTO: 48.9 FL (ref 37–54)
DEPRECATED RDW RBC AUTO: 50.1 FL (ref 37–54)
DEPRECATED RDW RBC AUTO: 50.1 FL (ref 37–54)
DEPRECATED RDW RBC AUTO: 50.3 FL (ref 37–54)
DEPRECATED RDW RBC AUTO: 50.4 FL (ref 37–54)
DEPRECATED RDW RBC AUTO: 51.2 FL (ref 37–54)
DEPRECATED RDW RBC AUTO: 51.2 FL (ref 37–54)
DEPRECATED RDW RBC AUTO: 51.6 FL (ref 37–54)
DEPRECATED RDW RBC AUTO: 53.2 FL (ref 37–54)
EOSINOPHIL # BLD AUTO: 0.01 10*3/MM3 (ref 0–0.4)
EOSINOPHIL # BLD AUTO: 0.08 10*3/MM3 (ref 0–0.4)
EOSINOPHIL # BLD AUTO: 0.1 10*3/MM3 (ref 0–0.4)
EOSINOPHIL # BLD AUTO: 0.1 10*3/MM3 (ref 0–0.4)
EOSINOPHIL # BLD AUTO: 0.16 10*3/MM3 (ref 0–0.4)
EOSINOPHIL NFR BLD AUTO: 0 % (ref 0.3–6.2)
EOSINOPHIL NFR BLD AUTO: 0.4 % (ref 0.3–6.2)
EOSINOPHIL NFR BLD AUTO: 0.5 % (ref 0.3–6.2)
EOSINOPHIL NFR BLD AUTO: 0.8 % (ref 0.3–6.2)
EOSINOPHIL NFR BLD AUTO: 1.4 % (ref 0.3–6.2)
EPAP: 14
ERYTHROCYTE [DISTWIDTH] IN BLOOD BY AUTOMATED COUNT: 12.9 % (ref 12.3–15.4)
ERYTHROCYTE [DISTWIDTH] IN BLOOD BY AUTOMATED COUNT: 13 % (ref 12.3–15.4)
ERYTHROCYTE [DISTWIDTH] IN BLOOD BY AUTOMATED COUNT: 13.2 % (ref 12.3–15.4)
ERYTHROCYTE [DISTWIDTH] IN BLOOD BY AUTOMATED COUNT: 13.4 % (ref 12.3–15.4)
ERYTHROCYTE [DISTWIDTH] IN BLOOD BY AUTOMATED COUNT: 13.5 % (ref 12.3–15.4)
ERYTHROCYTE [DISTWIDTH] IN BLOOD BY AUTOMATED COUNT: 14.2 % (ref 12.3–15.4)
ERYTHROCYTE [DISTWIDTH] IN BLOOD BY AUTOMATED COUNT: 14.9 % (ref 12.3–15.4)
ERYTHROCYTE [DISTWIDTH] IN BLOOD BY AUTOMATED COUNT: 15.1 % (ref 12.3–15.4)
ERYTHROCYTE [DISTWIDTH] IN BLOOD BY AUTOMATED COUNT: 15.5 % (ref 12.3–15.4)
ERYTHROCYTE [DISTWIDTH] IN BLOOD BY AUTOMATED COUNT: 15.5 % (ref 12.3–15.4)
ERYTHROCYTE [DISTWIDTH] IN BLOOD BY AUTOMATED COUNT: 15.6 % (ref 12.3–15.4)
ERYTHROCYTE [DISTWIDTH] IN BLOOD BY AUTOMATED COUNT: 15.7 % (ref 12.3–15.4)
ERYTHROCYTE [DISTWIDTH] IN BLOOD BY AUTOMATED COUNT: 15.8 % (ref 12.3–15.4)
ERYTHROCYTE [DISTWIDTH] IN BLOOD BY AUTOMATED COUNT: 16.2 % (ref 12.3–15.4)
GAMMA INTERFERON BACKGROUND BLD IA-ACNC: 0 IU/ML
GFR SERPL CREATININE-BSD FRML MDRD: 100 ML/MIN/1.73
GFR SERPL CREATININE-BSD FRML MDRD: 108 ML/MIN/1.73
GFR SERPL CREATININE-BSD FRML MDRD: 12 ML/MIN/1.73
GFR SERPL CREATININE-BSD FRML MDRD: 121 ML/MIN/1.73
GFR SERPL CREATININE-BSD FRML MDRD: 13 ML/MIN/1.73
GFR SERPL CREATININE-BSD FRML MDRD: 14 ML/MIN/1.73
GFR SERPL CREATININE-BSD FRML MDRD: 15 ML/MIN/1.73
GFR SERPL CREATININE-BSD FRML MDRD: 16 ML/MIN/1.73
GFR SERPL CREATININE-BSD FRML MDRD: 17 ML/MIN/1.73
GFR SERPL CREATININE-BSD FRML MDRD: 18 ML/MIN/1.73
GFR SERPL CREATININE-BSD FRML MDRD: 18 ML/MIN/1.73
GFR SERPL CREATININE-BSD FRML MDRD: 19 ML/MIN/1.73
GFR SERPL CREATININE-BSD FRML MDRD: 19 ML/MIN/1.73
GFR SERPL CREATININE-BSD FRML MDRD: 25 ML/MIN/1.73
GFR SERPL CREATININE-BSD FRML MDRD: 31 ML/MIN/1.73
GFR SERPL CREATININE-BSD FRML MDRD: 58 ML/MIN/1.73
GFR SERPL CREATININE-BSD FRML MDRD: 69 ML/MIN/1.73
GFR SERPL CREATININE-BSD FRML MDRD: 93 ML/MIN/1.73
GFR SERPL CREATININE-BSD FRML MDRD: 96 ML/MIN/1.73
GFR SERPL CREATININE-BSD FRML MDRD: ABNORMAL ML/MIN/{1.73_M2}
GLOBULIN UR ELPH-MCNC: 2.6 GM/DL
GLOBULIN UR ELPH-MCNC: 3 GM/DL
GLOBULIN UR ELPH-MCNC: 3.3 GM/DL
GLOBULIN UR ELPH-MCNC: 3.4 GM/DL
GLOBULIN UR ELPH-MCNC: 3.5 GM/DL
GLOBULIN UR ELPH-MCNC: 3.6 GM/DL
GLOBULIN UR ELPH-MCNC: 3.6 GM/DL
GLUCOSE BLDC GLUCOMTR-MCNC: 109 MG/DL (ref 70–130)
GLUCOSE BLDC GLUCOMTR-MCNC: 109 MG/DL (ref 70–130)
GLUCOSE BLDC GLUCOMTR-MCNC: 117 MG/DL (ref 70–130)
GLUCOSE BLDC GLUCOMTR-MCNC: 118 MG/DL (ref 70–130)
GLUCOSE BLDC GLUCOMTR-MCNC: 118 MG/DL (ref 70–130)
GLUCOSE BLDC GLUCOMTR-MCNC: 120 MG/DL (ref 70–130)
GLUCOSE BLDC GLUCOMTR-MCNC: 120 MG/DL (ref 70–130)
GLUCOSE BLDC GLUCOMTR-MCNC: 124 MG/DL (ref 70–130)
GLUCOSE BLDC GLUCOMTR-MCNC: 125 MG/DL (ref 70–130)
GLUCOSE BLDC GLUCOMTR-MCNC: 126 MG/DL (ref 70–130)
GLUCOSE BLDC GLUCOMTR-MCNC: 131 MG/DL (ref 70–130)
GLUCOSE BLDC GLUCOMTR-MCNC: 133 MG/DL (ref 70–130)
GLUCOSE BLDC GLUCOMTR-MCNC: 135 MG/DL (ref 70–130)
GLUCOSE BLDC GLUCOMTR-MCNC: 143 MG/DL (ref 70–130)
GLUCOSE BLDC GLUCOMTR-MCNC: 144 MG/DL (ref 70–130)
GLUCOSE BLDC GLUCOMTR-MCNC: 146 MG/DL (ref 70–130)
GLUCOSE BLDC GLUCOMTR-MCNC: 148 MG/DL (ref 70–130)
GLUCOSE BLDC GLUCOMTR-MCNC: 157 MG/DL (ref 70–130)
GLUCOSE BLDC GLUCOMTR-MCNC: 157 MG/DL (ref 70–130)
GLUCOSE BLDC GLUCOMTR-MCNC: 162 MG/DL (ref 70–130)
GLUCOSE BLDC GLUCOMTR-MCNC: 163 MG/DL (ref 70–130)
GLUCOSE BLDC GLUCOMTR-MCNC: 167 MG/DL (ref 70–130)
GLUCOSE BLDC GLUCOMTR-MCNC: 169 MG/DL (ref 70–130)
GLUCOSE BLDC GLUCOMTR-MCNC: 170 MG/DL (ref 70–130)
GLUCOSE BLDC GLUCOMTR-MCNC: 171 MG/DL (ref 70–130)
GLUCOSE BLDC GLUCOMTR-MCNC: 172 MG/DL (ref 70–130)
GLUCOSE BLDC GLUCOMTR-MCNC: 173 MG/DL (ref 70–130)
GLUCOSE BLDC GLUCOMTR-MCNC: 175 MG/DL (ref 70–130)
GLUCOSE BLDC GLUCOMTR-MCNC: 176 MG/DL (ref 70–130)
GLUCOSE BLDC GLUCOMTR-MCNC: 190 MG/DL (ref 70–130)
GLUCOSE BLDC GLUCOMTR-MCNC: 198 MG/DL (ref 70–130)
GLUCOSE BLDC GLUCOMTR-MCNC: 199 MG/DL (ref 70–130)
GLUCOSE BLDC GLUCOMTR-MCNC: 199 MG/DL (ref 70–130)
GLUCOSE BLDC GLUCOMTR-MCNC: 200 MG/DL (ref 70–130)
GLUCOSE BLDC GLUCOMTR-MCNC: 205 MG/DL (ref 70–130)
GLUCOSE BLDC GLUCOMTR-MCNC: 206 MG/DL (ref 70–130)
GLUCOSE BLDC GLUCOMTR-MCNC: 207 MG/DL (ref 70–130)
GLUCOSE BLDC GLUCOMTR-MCNC: 212 MG/DL (ref 70–130)
GLUCOSE BLDC GLUCOMTR-MCNC: 213 MG/DL (ref 70–130)
GLUCOSE BLDC GLUCOMTR-MCNC: 224 MG/DL (ref 70–130)
GLUCOSE BLDC GLUCOMTR-MCNC: 230 MG/DL (ref 70–130)
GLUCOSE BLDC GLUCOMTR-MCNC: 232 MG/DL (ref 70–130)
GLUCOSE BLDC GLUCOMTR-MCNC: 233 MG/DL (ref 70–130)
GLUCOSE BLDC GLUCOMTR-MCNC: 233 MG/DL (ref 70–130)
GLUCOSE BLDC GLUCOMTR-MCNC: 236 MG/DL (ref 70–130)
GLUCOSE BLDC GLUCOMTR-MCNC: 240 MG/DL (ref 70–130)
GLUCOSE BLDC GLUCOMTR-MCNC: 246 MG/DL (ref 70–130)
GLUCOSE BLDC GLUCOMTR-MCNC: 247 MG/DL (ref 70–130)
GLUCOSE BLDC GLUCOMTR-MCNC: 248 MG/DL (ref 70–130)
GLUCOSE BLDC GLUCOMTR-MCNC: 249 MG/DL (ref 70–130)
GLUCOSE BLDC GLUCOMTR-MCNC: 251 MG/DL (ref 70–130)
GLUCOSE BLDC GLUCOMTR-MCNC: 252 MG/DL (ref 70–130)
GLUCOSE BLDC GLUCOMTR-MCNC: 252 MG/DL (ref 70–130)
GLUCOSE BLDC GLUCOMTR-MCNC: 262 MG/DL (ref 70–130)
GLUCOSE BLDC GLUCOMTR-MCNC: 272 MG/DL (ref 70–130)
GLUCOSE BLDC GLUCOMTR-MCNC: 273 MG/DL (ref 70–130)
GLUCOSE BLDC GLUCOMTR-MCNC: 274 MG/DL (ref 70–130)
GLUCOSE BLDC GLUCOMTR-MCNC: 277 MG/DL (ref 70–130)
GLUCOSE BLDC GLUCOMTR-MCNC: 284 MG/DL (ref 70–130)
GLUCOSE BLDC GLUCOMTR-MCNC: 286 MG/DL (ref 70–130)
GLUCOSE BLDC GLUCOMTR-MCNC: 291 MG/DL (ref 70–130)
GLUCOSE BLDC GLUCOMTR-MCNC: 294 MG/DL (ref 70–130)
GLUCOSE BLDC GLUCOMTR-MCNC: 303 MG/DL (ref 70–130)
GLUCOSE BLDC GLUCOMTR-MCNC: 305 MG/DL (ref 70–130)
GLUCOSE BLDC GLUCOMTR-MCNC: 313 MG/DL (ref 70–130)
GLUCOSE BLDC GLUCOMTR-MCNC: 314 MG/DL (ref 70–130)
GLUCOSE BLDC GLUCOMTR-MCNC: 341 MG/DL (ref 70–130)
GLUCOSE BLDC GLUCOMTR-MCNC: 350 MG/DL (ref 70–130)
GLUCOSE BLDC GLUCOMTR-MCNC: 351 MG/DL (ref 70–130)
GLUCOSE BLDC GLUCOMTR-MCNC: 378 MG/DL (ref 70–130)
GLUCOSE BLDC GLUCOMTR-MCNC: 379 MG/DL (ref 70–130)
GLUCOSE BLDC GLUCOMTR-MCNC: 380 MG/DL (ref 70–130)
GLUCOSE BLDC GLUCOMTR-MCNC: 408 MG/DL (ref 70–130)
GLUCOSE BLDC GLUCOMTR-MCNC: 427 MG/DL (ref 70–130)
GLUCOSE BLDC GLUCOMTR-MCNC: 47 MG/DL (ref 70–130)
GLUCOSE BLDC GLUCOMTR-MCNC: 48 MG/DL (ref 70–130)
GLUCOSE BLDC GLUCOMTR-MCNC: 50 MG/DL (ref 70–130)
GLUCOSE BLDC GLUCOMTR-MCNC: 74 MG/DL (ref 70–130)
GLUCOSE BLDC GLUCOMTR-MCNC: 83 MG/DL (ref 70–130)
GLUCOSE BLDC GLUCOMTR-MCNC: 83 MG/DL (ref 70–130)
GLUCOSE BLDC GLUCOMTR-MCNC: 84 MG/DL (ref 70–130)
GLUCOSE BLDC GLUCOMTR-MCNC: 87 MG/DL (ref 70–130)
GLUCOSE BLDC GLUCOMTR-MCNC: 99 MG/DL (ref 70–130)
GLUCOSE SERPL-MCNC: 131 MG/DL (ref 65–99)
GLUCOSE SERPL-MCNC: 133 MG/DL (ref 65–99)
GLUCOSE SERPL-MCNC: 141 MG/DL (ref 65–99)
GLUCOSE SERPL-MCNC: 162 MG/DL (ref 65–99)
GLUCOSE SERPL-MCNC: 180 MG/DL (ref 65–99)
GLUCOSE SERPL-MCNC: 189 MG/DL (ref 65–99)
GLUCOSE SERPL-MCNC: 205 MG/DL (ref 65–99)
GLUCOSE SERPL-MCNC: 206 MG/DL (ref 65–99)
GLUCOSE SERPL-MCNC: 211 MG/DL (ref 65–99)
GLUCOSE SERPL-MCNC: 220 MG/DL (ref 65–99)
GLUCOSE SERPL-MCNC: 243 MG/DL (ref 65–99)
GLUCOSE SERPL-MCNC: 245 MG/DL (ref 65–99)
GLUCOSE SERPL-MCNC: 250 MG/DL (ref 65–99)
GLUCOSE SERPL-MCNC: 258 MG/DL (ref 65–99)
GLUCOSE SERPL-MCNC: 260 MG/DL (ref 65–99)
GLUCOSE SERPL-MCNC: 287 MG/DL (ref 65–99)
GLUCOSE SERPL-MCNC: 328 MG/DL (ref 65–99)
GLUCOSE SERPL-MCNC: 346 MG/DL (ref 65–99)
GLUCOSE SERPL-MCNC: 54 MG/DL (ref 65–99)
GLUCOSE SERPL-MCNC: 91 MG/DL (ref 65–99)
GLUCOSE SERPL-MCNC: 98 MG/DL (ref 65–99)
GLUCOSE UR STRIP-MCNC: ABNORMAL MG/DL
GLUCOSE UR STRIP-MCNC: ABNORMAL MG/DL
GLUCOSE UR STRIP-MCNC: NEGATIVE MG/DL
GLUCOSE UR STRIP-MCNC: NEGATIVE MG/DL
GRAM STN SPEC: ABNORMAL
HAV IGM SERPL QL IA: NORMAL
HBA1C MFR BLD: 15.5 % (ref 4.8–5.6)
HBV CORE IGM SERPL QL IA: NORMAL
HBV SURFACE AB SER RIA-ACNC: NORMAL
HBV SURFACE AG SERPL QL IA: NORMAL
HCO3 BLDA-SCNC: 20 MMOL/L (ref 20–26)
HCO3 BLDA-SCNC: 20.7 MMOL/L (ref 20–26)
HCO3 BLDA-SCNC: 20.8 MMOL/L (ref 20–26)
HCO3 BLDA-SCNC: 21 MMOL/L (ref 20–26)
HCO3 BLDA-SCNC: 21.5 MMOL/L (ref 20–26)
HCO3 BLDA-SCNC: 21.5 MMOL/L (ref 20–26)
HCO3 BLDA-SCNC: 21.6 MMOL/L (ref 20–26)
HCO3 BLDA-SCNC: 21.9 MMOL/L (ref 20–26)
HCO3 BLDA-SCNC: 22.3 MMOL/L (ref 20–26)
HCO3 BLDA-SCNC: 22.5 MMOL/L (ref 20–26)
HCO3 BLDA-SCNC: 22.6 MMOL/L (ref 20–26)
HCO3 BLDA-SCNC: 23 MMOL/L (ref 20–26)
HCO3 BLDA-SCNC: 24.6 MMOL/L (ref 20–26)
HCO3 BLDA-SCNC: 24.6 MMOL/L (ref 20–26)
HCO3 BLDA-SCNC: 24.9 MMOL/L (ref 20–26)
HCO3 BLDA-SCNC: 24.9 MMOL/L (ref 20–26)
HCO3 BLDA-SCNC: 25.2 MMOL/L (ref 20–26)
HCO3 BLDA-SCNC: 25.4 MMOL/L (ref 20–26)
HCO3 BLDA-SCNC: 25.4 MMOL/L (ref 20–26)
HCO3 BLDA-SCNC: 25.7 MMOL/L (ref 20–26)
HCO3 BLDA-SCNC: 25.7 MMOL/L (ref 20–26)
HCO3 BLDA-SCNC: 26.2 MMOL/L (ref 20–26)
HCO3 BLDA-SCNC: 27.2 MMOL/L (ref 20–26)
HCO3 BLDA-SCNC: 28.9 MMOL/L (ref 20–26)
HCO3 BLDA-SCNC: 29 MMOL/L (ref 20–26)
HCO3 BLDA-SCNC: 29.1 MMOL/L (ref 20–26)
HCO3 BLDA-SCNC: 29.1 MMOL/L (ref 20–26)
HCO3 BLDA-SCNC: 29.5 MMOL/L (ref 20–26)
HCO3 BLDA-SCNC: 29.5 MMOL/L (ref 20–26)
HCO3 BLDA-SCNC: 29.8 MMOL/L (ref 20–26)
HCO3 BLDA-SCNC: 31 MMOL/L (ref 20–26)
HCO3 BLDA-SCNC: NORMAL MMOL/L
HCT VFR BLD AUTO: 18.9 % (ref 37.5–51)
HCT VFR BLD AUTO: 20.6 % (ref 37.5–51)
HCT VFR BLD AUTO: 21.2 % (ref 37.5–51)
HCT VFR BLD AUTO: 21.4 % (ref 37.5–51)
HCT VFR BLD AUTO: 21.4 % (ref 37.5–51)
HCT VFR BLD AUTO: 21.5 % (ref 37.5–51)
HCT VFR BLD AUTO: 23.5 % (ref 37.5–51)
HCT VFR BLD AUTO: 23.5 % (ref 37.5–51)
HCT VFR BLD AUTO: 23.6 % (ref 37.5–51)
HCT VFR BLD AUTO: 23.7 % (ref 37.5–51)
HCT VFR BLD AUTO: 23.8 % (ref 37.5–51)
HCT VFR BLD AUTO: 24 % (ref 37.5–51)
HCT VFR BLD AUTO: 24.1 % (ref 37.5–51)
HCT VFR BLD AUTO: 24.2 % (ref 37.5–51)
HCT VFR BLD AUTO: 24.8 % (ref 37.5–51)
HCT VFR BLD AUTO: 26 % (ref 37.5–51)
HCT VFR BLD AUTO: 26.1 % (ref 37.5–51)
HCT VFR BLD AUTO: 26.4 % (ref 37.5–51)
HCT VFR BLD AUTO: 26.7 % (ref 37.5–51)
HCT VFR BLD AUTO: 35.8 % (ref 37.5–51)
HCV AB SER DONR QL: NORMAL
HEMOCCULT STL QL: POSITIVE
HGB BLD-MCNC: 11.4 G/DL (ref 13–17.7)
HGB BLD-MCNC: 6.2 G/DL (ref 13–17.7)
HGB BLD-MCNC: 6.6 G/DL (ref 13–17.7)
HGB BLD-MCNC: 6.7 G/DL (ref 13–17.7)
HGB BLD-MCNC: 7 G/DL (ref 13–17.7)
HGB BLD-MCNC: 7.1 G/DL (ref 13–17.7)
HGB BLD-MCNC: 7.1 G/DL (ref 13–17.7)
HGB BLD-MCNC: 7.5 G/DL (ref 13–17.7)
HGB BLD-MCNC: 7.6 G/DL (ref 13–17.7)
HGB BLD-MCNC: 7.7 G/DL (ref 13–17.7)
HGB BLD-MCNC: 7.9 G/DL (ref 13–17.7)
HGB BLD-MCNC: 8 G/DL (ref 13–17.7)
HGB BLD-MCNC: 8 G/DL (ref 13–17.7)
HGB BLD-MCNC: 8.2 G/DL (ref 13–17.7)
HGB BLD-MCNC: 8.2 G/DL (ref 13–17.7)
HGB BLD-MCNC: 8.3 G/DL (ref 13–17.7)
HGB BLD-MCNC: 8.3 G/DL (ref 13–17.7)
HGB BLD-MCNC: 8.4 G/DL (ref 13–17.7)
HGB BLD-MCNC: 8.6 G/DL (ref 13–17.7)
HGB BLD-MCNC: 8.8 G/DL (ref 13–17.7)
HGB UR QL STRIP.AUTO: ABNORMAL
HIV1+2 AB SER QL: NORMAL
HOLD SPECIMEN: NORMAL
HYALINE CASTS UR QL AUTO: ABNORMAL /LPF
IMM GRANULOCYTES # BLD AUTO: 0.09 10*3/MM3 (ref 0–0.05)
IMM GRANULOCYTES # BLD AUTO: 0.12 10*3/MM3 (ref 0–0.05)
IMM GRANULOCYTES # BLD AUTO: 0.22 10*3/MM3 (ref 0–0.05)
IMM GRANULOCYTES # BLD AUTO: 0.32 10*3/MM3 (ref 0–0.05)
IMM GRANULOCYTES # BLD AUTO: 0.36 10*3/MM3 (ref 0–0.05)
IMM GRANULOCYTES NFR BLD AUTO: 0.7 % (ref 0–0.5)
IMM GRANULOCYTES NFR BLD AUTO: 0.8 % (ref 0–0.5)
IMM GRANULOCYTES NFR BLD AUTO: 0.8 % (ref 0–0.5)
IMM GRANULOCYTES NFR BLD AUTO: 1.7 % (ref 0–0.5)
IMM GRANULOCYTES NFR BLD AUTO: 3.3 % (ref 0–0.5)
INDURATION: 0 MM (ref 0–10)
INHALED O2 CONCENTRATION: 100 %
INHALED O2 CONCENTRATION: 35 %
INHALED O2 CONCENTRATION: 40 %
INHALED O2 CONCENTRATION: 45 %
INHALED O2 CONCENTRATION: 50 %
INHALED O2 CONCENTRATION: 60 %
INHALED O2 CONCENTRATION: 70 %
INHALED O2 CONCENTRATION: 75 %
INHALED O2 CONCENTRATION: 80 %
INHALED O2 CONCENTRATION: 80 %
INHALED O2 CONCENTRATION: 90 %
INHALED O2 CONCENTRATION: NORMAL %
INR PPP: 1.04 (ref 0.91–1.09)
INR PPP: 1.08 (ref 0.91–1.09)
INR PPP: 1.49 (ref 0.91–1.09)
INR PPP: 1.66 (ref 0.91–1.09)
INR PPP: 1.78 (ref 0.91–1.09)
IPAP: 12
IPAP: 30
IRON 24H UR-MRATE: 10 MCG/DL (ref 59–158)
IRON SATN MFR SERPL: 8 % (ref 20–50)
KETONES UR QL STRIP: ABNORMAL
KETONES UR QL STRIP: ABNORMAL
KETONES UR QL STRIP: NEGATIVE
KETONES UR QL STRIP: NEGATIVE
L PNEUMO1 AG UR QL IA: NEGATIVE
LEFT ATRIUM VOLUME INDEX: 15.8 ML/M2
LEFT ATRIUM VOLUME: 31.5 CM3
LEUKOCYTE ESTERASE UR QL STRIP.AUTO: ABNORMAL
LEUKOCYTE ESTERASE UR QL STRIP.AUTO: NEGATIVE
LV EF 2D ECHO EST: 70 %
LYMPHOCYTES # BLD AUTO: 0.82 10*3/MM3 (ref 0.7–3.1)
LYMPHOCYTES # BLD AUTO: 1 10*3/MM3 (ref 0.7–3.1)
LYMPHOCYTES # BLD AUTO: 1.43 10*3/MM3 (ref 0.7–3.1)
LYMPHOCYTES # BLD AUTO: 2.36 10*3/MM3 (ref 0.7–3.1)
LYMPHOCYTES # BLD AUTO: 5.43 10*3/MM3 (ref 0.7–3.1)
LYMPHOCYTES # BLD MANUAL: 0.46 10*3/MM3 (ref 0.7–3.1)
LYMPHOCYTES NFR BLD AUTO: 12 % (ref 19.6–45.3)
LYMPHOCYTES NFR BLD AUTO: 13.1 % (ref 19.6–45.3)
LYMPHOCYTES NFR BLD AUTO: 19.5 % (ref 19.6–45.3)
LYMPHOCYTES NFR BLD AUTO: 5.2 % (ref 19.6–45.3)
LYMPHOCYTES NFR BLD AUTO: 7.4 % (ref 19.6–45.3)
LYMPHOCYTES NFR BLD MANUAL: 3 % (ref 5–12)
LYMPHOCYTES NFR BLD MANUAL: 4 % (ref 19.6–45.3)
Lab: ABNORMAL
Lab: NORMAL
M TB IFN-G BLD-IMP: ABNORMAL
M TB IFN-G CD4+ BCKGRND COR BLD-ACNC: 0 IU/ML
M TB IFN-G CD4+CD8+ BCKGRND COR BLD-ACNC: 0.01 IU/ML
MACROCYTES BLD QL SMEAR: ABNORMAL
MAGNESIUM SERPL-MCNC: 1.9 MG/DL (ref 1.6–2.6)
MAGNESIUM SERPL-MCNC: 1.9 MG/DL (ref 1.6–2.6)
MAGNESIUM SERPL-MCNC: 2.1 MG/DL (ref 1.6–2.6)
MAGNESIUM SERPL-MCNC: 2.2 MG/DL (ref 1.6–2.6)
MAXIMAL PREDICTED HEART RATE: 164 BPM
MAXIMAL PREDICTED HEART RATE: 164 BPM
MCH RBC QN AUTO: 28.7 PG (ref 26.6–33)
MCH RBC QN AUTO: 28.8 PG (ref 26.6–33)
MCH RBC QN AUTO: 29 PG (ref 26.6–33)
MCH RBC QN AUTO: 29.3 PG (ref 26.6–33)
MCH RBC QN AUTO: 29.4 PG (ref 26.6–33)
MCH RBC QN AUTO: 29.6 PG (ref 26.6–33)
MCH RBC QN AUTO: 29.6 PG (ref 26.6–33)
MCH RBC QN AUTO: 29.7 PG (ref 26.6–33)
MCH RBC QN AUTO: 29.7 PG (ref 26.6–33)
MCH RBC QN AUTO: 29.8 PG (ref 26.6–33)
MCH RBC QN AUTO: 30 PG (ref 26.6–33)
MCH RBC QN AUTO: 30.1 PG (ref 26.6–33)
MCHC RBC AUTO-ENTMCNC: 31.1 G/DL (ref 31.5–35.7)
MCHC RBC AUTO-ENTMCNC: 31.6 G/DL (ref 31.5–35.7)
MCHC RBC AUTO-ENTMCNC: 31.8 G/DL (ref 31.5–35.7)
MCHC RBC AUTO-ENTMCNC: 31.9 G/DL (ref 31.5–35.7)
MCHC RBC AUTO-ENTMCNC: 31.9 G/DL (ref 31.5–35.7)
MCHC RBC AUTO-ENTMCNC: 32.2 G/DL (ref 31.5–35.7)
MCHC RBC AUTO-ENTMCNC: 32.6 G/DL (ref 31.5–35.7)
MCHC RBC AUTO-ENTMCNC: 32.8 G/DL (ref 31.5–35.7)
MCHC RBC AUTO-ENTMCNC: 32.8 G/DL (ref 31.5–35.7)
MCHC RBC AUTO-ENTMCNC: 33.1 G/DL (ref 31.5–35.7)
MCHC RBC AUTO-ENTMCNC: 33.2 G/DL (ref 31.5–35.7)
MCHC RBC AUTO-ENTMCNC: 33.2 G/DL (ref 31.5–35.7)
MCHC RBC AUTO-ENTMCNC: 33.3 G/DL (ref 31.5–35.7)
MCHC RBC AUTO-ENTMCNC: 33.5 G/DL (ref 31.5–35.7)
MCHC RBC AUTO-ENTMCNC: 33.8 G/DL (ref 31.5–35.7)
MCHC RBC AUTO-ENTMCNC: 34.2 G/DL (ref 31.5–35.7)
MCHC RBC AUTO-ENTMCNC: 34.4 G/DL (ref 31.5–35.7)
MCV RBC AUTO: 86.1 FL (ref 79–97)
MCV RBC AUTO: 87.7 FL (ref 79–97)
MCV RBC AUTO: 87.9 FL (ref 79–97)
MCV RBC AUTO: 88 FL (ref 79–97)
MCV RBC AUTO: 88.1 FL (ref 79–97)
MCV RBC AUTO: 88.4 FL (ref 79–97)
MCV RBC AUTO: 89.1 FL (ref 79–97)
MCV RBC AUTO: 89.2 FL (ref 79–97)
MCV RBC AUTO: 89.6 FL (ref 79–97)
MCV RBC AUTO: 89.7 FL (ref 79–97)
MCV RBC AUTO: 90.1 FL (ref 79–97)
MCV RBC AUTO: 90.3 FL (ref 79–97)
MCV RBC AUTO: 90.6 FL (ref 79–97)
MCV RBC AUTO: 91.2 FL (ref 79–97)
MCV RBC AUTO: 91.8 FL (ref 79–97)
MCV RBC AUTO: 91.8 FL (ref 79–97)
MCV RBC AUTO: 91.9 FL (ref 79–97)
MCV RBC AUTO: 93.7 FL (ref 79–97)
MCV RBC AUTO: 94.3 FL (ref 79–97)
METHADONE UR QL SCN: NEGATIVE
MITOGEN IGNF BLD-ACNC: 0 IU/ML
MODALITY: ABNORMAL
MODALITY: NORMAL
MODALITY: NORMAL
MONOCYTES # BLD AUTO: 0.35 10*3/MM3 (ref 0.1–0.9)
MONOCYTES # BLD AUTO: 0.58 10*3/MM3 (ref 0.1–0.9)
MONOCYTES # BLD AUTO: 0.86 10*3/MM3 (ref 0.1–0.9)
MONOCYTES # BLD AUTO: 0.99 10*3/MM3 (ref 0.1–0.9)
MONOCYTES # BLD AUTO: 1.4 10*3/MM3 (ref 0.1–0.9)
MONOCYTES # BLD AUTO: 1.43 10*3/MM3 (ref 0.1–0.9)
MONOCYTES NFR BLD AUTO: 4.8 % (ref 5–12)
MONOCYTES NFR BLD AUTO: 5.1 % (ref 5–12)
MONOCYTES NFR BLD AUTO: 5.2 % (ref 5–12)
MONOCYTES NFR BLD AUTO: 7.3 % (ref 5–12)
MONOCYTES NFR BLD AUTO: 8.3 % (ref 5–12)
MRSA DNA SPEC QL NAA+PROBE: NORMAL
MYOGLOBIN UR-MCNC: <2 NG/ML (ref 0–13)
NEUTROPHILS # BLD AUTO: 10.7 10*3/MM3 (ref 1.7–7)
NEUTROPHILS NFR BLD AUTO: 14.46 10*3/MM3 (ref 1.7–7)
NEUTROPHILS NFR BLD AUTO: 16.34 10*3/MM3 (ref 1.7–7)
NEUTROPHILS NFR BLD AUTO: 20.65 10*3/MM3 (ref 1.7–7)
NEUTROPHILS NFR BLD AUTO: 74.1 % (ref 42.7–76)
NEUTROPHILS NFR BLD AUTO: 77.7 % (ref 42.7–76)
NEUTROPHILS NFR BLD AUTO: 80.6 % (ref 42.7–76)
NEUTROPHILS NFR BLD AUTO: 82.4 % (ref 42.7–76)
NEUTROPHILS NFR BLD AUTO: 85 % (ref 42.7–76)
NEUTROPHILS NFR BLD AUTO: 9.1 10*3/MM3 (ref 1.7–7)
NEUTROPHILS NFR BLD AUTO: 9.27 10*3/MM3 (ref 1.7–7)
NEUTROPHILS NFR BLD MANUAL: 91 % (ref 42.7–76)
NEUTS BAND NFR BLD MANUAL: 2 % (ref 0–5)
NITRITE UR QL STRIP: NEGATIVE
NOTIFIED BY: ABNORMAL
NOTIFIED BY: NORMAL
NOTIFIED WHO: ABNORMAL
NOTIFIED WHO: NORMAL
NRBC BLD AUTO-RTO: 0 /100 WBC (ref 0–0.2)
NRBC BLD AUTO-RTO: 0.3 /100 WBC (ref 0–0.2)
NT-PROBNP SERPL-MCNC: 625.6 PG/ML (ref 0–900)
OPIATES UR QL: NEGATIVE
OXYCODONE UR QL SCN: NEGATIVE
PAW @ PEAK INSP FLOW SETTING VENT: 12 CMH2O
PAW @ PEAK INSP FLOW SETTING VENT: 17 CMH2O
PAW @ PEAK INSP FLOW SETTING VENT: 20 CMH2O
PAW @ PEAK INSP FLOW SETTING VENT: 26 CMH2O
PCO2 BLDA: 37.8 MM HG (ref 35–45)
PCO2 BLDA: 38.5 MM HG (ref 35–45)
PCO2 BLDA: 38.6 MM HG (ref 35–45)
PCO2 BLDA: 39.1 MM HG (ref 35–45)
PCO2 BLDA: 39.3 MM HG (ref 35–45)
PCO2 BLDA: 40.4 MM HG (ref 35–45)
PCO2 BLDA: 41.1 MM HG (ref 35–45)
PCO2 BLDA: 41.3 MM HG (ref 35–45)
PCO2 BLDA: 41.3 MM HG (ref 35–45)
PCO2 BLDA: 41.4 MM HG (ref 35–45)
PCO2 BLDA: 41.5 MM HG (ref 35–45)
PCO2 BLDA: 41.6 MM HG (ref 35–45)
PCO2 BLDA: 42.1 MM HG (ref 35–45)
PCO2 BLDA: 42.4 MM HG (ref 35–45)
PCO2 BLDA: 42.6 MM HG (ref 35–45)
PCO2 BLDA: 43.6 MM HG (ref 35–45)
PCO2 BLDA: 43.8 MM HG (ref 35–45)
PCO2 BLDA: 45 MM HG (ref 35–45)
PCO2 BLDA: 45.1 MM HG (ref 35–45)
PCO2 BLDA: 45.4 MM HG (ref 35–45)
PCO2 BLDA: 46.6 MM HG (ref 35–45)
PCO2 BLDA: 46.6 MM HG (ref 35–45)
PCO2 BLDA: 46.9 MM HG (ref 35–45)
PCO2 BLDA: 48.6 MM HG (ref 35–45)
PCO2 BLDA: 51.6 MM HG (ref 35–45)
PCO2 BLDA: 52 MM HG (ref 35–45)
PCO2 BLDA: 53.7 MM HG (ref 35–45)
PCO2 BLDA: 54 MM HG (ref 35–45)
PCO2 BLDA: 59.7 MM HG (ref 35–45)
PCO2 BLDA: 62.3 MM HG (ref 35–45)
PCO2 BLDA: 63.6 MM HG (ref 35–45)
PCO2 BLDA: NORMAL MM[HG]
PCO2 TEMP ADJ BLD: 37.8 MM HG (ref 35–45)
PCO2 TEMP ADJ BLD: 38.5 MM HG (ref 35–45)
PCO2 TEMP ADJ BLD: 38.6 MM HG (ref 35–45)
PCO2 TEMP ADJ BLD: 39.1 MM HG (ref 35–45)
PCO2 TEMP ADJ BLD: 39.3 MM HG (ref 35–45)
PCO2 TEMP ADJ BLD: 40.4 MM HG (ref 35–45)
PCO2 TEMP ADJ BLD: 41.1 MM HG (ref 35–45)
PCO2 TEMP ADJ BLD: 41.3 MM HG (ref 35–45)
PCO2 TEMP ADJ BLD: 41.3 MM HG (ref 35–45)
PCO2 TEMP ADJ BLD: 41.4 MM HG (ref 35–45)
PCO2 TEMP ADJ BLD: 41.5 MM HG (ref 35–45)
PCO2 TEMP ADJ BLD: 41.6 MM HG (ref 35–45)
PCO2 TEMP ADJ BLD: 42.1 MM HG (ref 35–45)
PCO2 TEMP ADJ BLD: 42.4 MM HG (ref 35–45)
PCO2 TEMP ADJ BLD: 42.6 MM HG (ref 35–45)
PCO2 TEMP ADJ BLD: 43.4 MM HG (ref 35–45)
PCO2 TEMP ADJ BLD: 43.6 MM HG (ref 35–45)
PCO2 TEMP ADJ BLD: 43.8 MM HG (ref 35–45)
PCO2 TEMP ADJ BLD: 45 MM HG (ref 35–45)
PCO2 TEMP ADJ BLD: 45.1 MM HG (ref 35–45)
PCO2 TEMP ADJ BLD: 45.4 MM HG (ref 35–45)
PCO2 TEMP ADJ BLD: 46.6 MM HG (ref 35–45)
PCO2 TEMP ADJ BLD: 46.6 MM HG (ref 35–45)
PCO2 TEMP ADJ BLD: 46.9 MM HG (ref 35–45)
PCO2 TEMP ADJ BLD: 51.6 MM HG (ref 35–45)
PCO2 TEMP ADJ BLD: 52 MM HG (ref 35–45)
PCO2 TEMP ADJ BLD: 53 MM HG (ref 35–45)
PCO2 TEMP ADJ BLD: 53.7 MM HG (ref 35–45)
PCO2 TEMP ADJ BLD: 54 MM HG (ref 35–45)
PCO2 TEMP ADJ BLD: 62.3 MM HG (ref 35–45)
PCO2 TEMP ADJ BLD: 63.6 MM HG (ref 35–45)
PCO2 TEMP ADJ BLD: NORMAL MM[HG]
PCP UR QL SCN: NEGATIVE
PEEP RESPIRATORY: 10 CM[H2O]
PEEP RESPIRATORY: 10 CM[H2O]
PEEP RESPIRATORY: 12 CM[H2O]
PEEP RESPIRATORY: 12 CM[H2O]
PEEP RESPIRATORY: 5 CM[H2O]
PEEP RESPIRATORY: 6 CM[H2O]
PEEP RESPIRATORY: 7 CM[H2O]
PEEP RESPIRATORY: 7 CM[H2O]
PEEP RESPIRATORY: 8 CM[H2O]
PEEP RESPIRATORY: 9 CM[H2O]
PEEP RESPIRATORY: NORMAL CM[H2O]
PH BLDA: 7.17 PH UNITS (ref 7.35–7.45)
PH BLDA: 7.19 PH UNITS (ref 7.35–7.45)
PH BLDA: 7.21 PH UNITS (ref 7.35–7.45)
PH BLDA: 7.21 PH UNITS (ref 7.35–7.45)
PH BLDA: 7.22 PH UNITS (ref 7.35–7.45)
PH BLDA: 7.26 PH UNITS (ref 7.35–7.45)
PH BLDA: 7.27 PH UNITS (ref 7.35–7.45)
PH BLDA: 7.28 PH UNITS (ref 7.35–7.45)
PH BLDA: 7.29 PH UNITS (ref 7.35–7.45)
PH BLDA: 7.31 PH UNITS (ref 7.35–7.45)
PH BLDA: 7.32 PH UNITS (ref 7.35–7.45)
PH BLDA: 7.34 PH UNITS (ref 7.35–7.45)
PH BLDA: 7.35 PH UNITS (ref 7.35–7.45)
PH BLDA: 7.35 PH UNITS (ref 7.35–7.45)
PH BLDA: 7.36 PH UNITS (ref 7.35–7.45)
PH BLDA: 7.38 PH UNITS (ref 7.35–7.45)
PH BLDA: 7.39 PH UNITS (ref 7.35–7.45)
PH BLDA: 7.4 PH UNITS (ref 7.35–7.45)
PH BLDA: 7.4 PH UNITS (ref 7.35–7.45)
PH BLDA: 7.41 PH UNITS (ref 7.35–7.45)
PH BLDA: 7.42 PH UNITS (ref 7.35–7.45)
PH BLDA: 7.43 PH UNITS (ref 7.35–7.45)
PH BLDA: 7.44 PH UNITS (ref 7.35–7.45)
PH BLDA: 7.46 PH UNITS (ref 7.35–7.45)
PH BLDA: 7.47 PH UNITS (ref 7.35–7.45)
PH BLDA: 7.47 PH UNITS (ref 7.35–7.45)
PH BLDA: 7.48 PH UNITS (ref 7.35–7.45)
PH BLDA: NORMAL [PH]
PH UR STRIP.AUTO: 6 [PH] (ref 5–8)
PH UR STRIP.AUTO: 6 [PH] (ref 5–8)
PH UR STRIP.AUTO: 8 [PH] (ref 5–8)
PH UR STRIP.AUTO: <=5 [PH] (ref 5–8)
PH, TEMP CORRECTED: 7.17 PH UNITS (ref 7.35–7.45)
PH, TEMP CORRECTED: 7.21 PH UNITS (ref 7.35–7.45)
PH, TEMP CORRECTED: 7.21 PH UNITS (ref 7.35–7.45)
PH, TEMP CORRECTED: 7.22 PH UNITS (ref 7.35–7.45)
PH, TEMP CORRECTED: 7.22 PH UNITS (ref 7.35–7.45)
PH, TEMP CORRECTED: 7.26 PH UNITS (ref 7.35–7.45)
PH, TEMP CORRECTED: 7.27 PH UNITS (ref 7.35–7.45)
PH, TEMP CORRECTED: 7.28 PH UNITS (ref 7.35–7.45)
PH, TEMP CORRECTED: 7.29 PH UNITS (ref 7.35–7.45)
PH, TEMP CORRECTED: 7.31 PH UNITS (ref 7.35–7.45)
PH, TEMP CORRECTED: 7.32 PH UNITS (ref 7.35–7.45)
PH, TEMP CORRECTED: 7.32 PH UNITS (ref 7.35–7.45)
PH, TEMP CORRECTED: 7.34 PH UNITS (ref 7.35–7.45)
PH, TEMP CORRECTED: 7.35 PH UNITS (ref 7.35–7.45)
PH, TEMP CORRECTED: 7.35 PH UNITS (ref 7.35–7.45)
PH, TEMP CORRECTED: 7.36 PH UNITS (ref 7.35–7.45)
PH, TEMP CORRECTED: 7.36 PH UNITS (ref 7.35–7.45)
PH, TEMP CORRECTED: 7.38 PH UNITS (ref 7.35–7.45)
PH, TEMP CORRECTED: 7.39 PH UNITS (ref 7.35–7.45)
PH, TEMP CORRECTED: 7.4 PH UNITS (ref 7.35–7.45)
PH, TEMP CORRECTED: 7.4 PH UNITS (ref 7.35–7.45)
PH, TEMP CORRECTED: 7.41 PH UNITS (ref 7.35–7.45)
PH, TEMP CORRECTED: 7.42 PH UNITS (ref 7.35–7.45)
PH, TEMP CORRECTED: 7.43 PH UNITS (ref 7.35–7.45)
PH, TEMP CORRECTED: 7.44 PH UNITS (ref 7.35–7.45)
PH, TEMP CORRECTED: 7.46 PH UNITS (ref 7.35–7.45)
PH, TEMP CORRECTED: 7.47 PH UNITS (ref 7.35–7.45)
PH, TEMP CORRECTED: 7.47 PH UNITS (ref 7.35–7.45)
PH, TEMP CORRECTED: 7.48 PH UNITS (ref 7.35–7.45)
PH, TEMP CORRECTED: NORMAL
PHOSPHATE SERPL-MCNC: 3.9 MG/DL (ref 2.5–4.5)
PHOSPHATE SERPL-MCNC: 4.2 MG/DL (ref 2.5–4.5)
PHOSPHATE SERPL-MCNC: 4.6 MG/DL (ref 2.5–4.5)
PLAT MORPH BLD: NORMAL
PLATELET # BLD AUTO: 114 10*3/MM3 (ref 140–450)
PLATELET # BLD AUTO: 134 10*3/MM3 (ref 140–450)
PLATELET # BLD AUTO: 138 10*3/MM3 (ref 140–450)
PLATELET # BLD AUTO: 150 10*3/MM3 (ref 140–450)
PLATELET # BLD AUTO: 151 10*3/MM3 (ref 140–450)
PLATELET # BLD AUTO: 157 10*3/MM3 (ref 140–450)
PLATELET # BLD AUTO: 158 10*3/MM3 (ref 140–450)
PLATELET # BLD AUTO: 159 10*3/MM3 (ref 140–450)
PLATELET # BLD AUTO: 161 10*3/MM3 (ref 140–450)
PLATELET # BLD AUTO: 169 10*3/MM3 (ref 140–450)
PLATELET # BLD AUTO: 173 10*3/MM3 (ref 140–450)
PLATELET # BLD AUTO: 184 10*3/MM3 (ref 140–450)
PLATELET # BLD AUTO: 233 10*3/MM3 (ref 140–450)
PLATELET # BLD AUTO: 255 10*3/MM3 (ref 140–450)
PLATELET # BLD AUTO: 275 10*3/MM3 (ref 140–450)
PLATELET # BLD AUTO: 358 10*3/MM3 (ref 140–450)
PLATELET # BLD AUTO: 93 10*3/MM3 (ref 140–450)
PLATELET # BLD AUTO: 96 10*3/MM3 (ref 140–450)
PLATELET # BLD AUTO: 99 10*3/MM3 (ref 140–450)
PMV BLD AUTO: 10 FL (ref 6–12)
PMV BLD AUTO: 10.1 FL (ref 6–12)
PMV BLD AUTO: 10.1 FL (ref 6–12)
PMV BLD AUTO: 10.5 FL (ref 6–12)
PMV BLD AUTO: 11 FL (ref 6–12)
PMV BLD AUTO: 11 FL (ref 6–12)
PMV BLD AUTO: 11.1 FL (ref 6–12)
PMV BLD AUTO: 11.2 FL (ref 6–12)
PMV BLD AUTO: 11.3 FL (ref 6–12)
PMV BLD AUTO: 11.7 FL (ref 6–12)
PMV BLD AUTO: 11.8 FL (ref 6–12)
PMV BLD AUTO: 12 FL (ref 6–12)
PMV BLD AUTO: 9.3 FL (ref 6–12)
PMV BLD AUTO: 9.4 FL (ref 6–12)
PMV BLD AUTO: 9.5 FL (ref 6–12)
PMV BLD AUTO: 9.5 FL (ref 6–12)
PMV BLD AUTO: 9.6 FL (ref 6–12)
PO2 BLDA: 106 MM HG (ref 83–108)
PO2 BLDA: 107 MM HG (ref 83–108)
PO2 BLDA: 108 MM HG (ref 83–108)
PO2 BLDA: 108 MM HG (ref 83–108)
PO2 BLDA: 114 MM HG (ref 83–108)
PO2 BLDA: 131 MM HG (ref 83–108)
PO2 BLDA: 177 MM HG (ref 83–108)
PO2 BLDA: 213 MM HG (ref 83–108)
PO2 BLDA: 45.9 MM HG (ref 83–108)
PO2 BLDA: 61.3 MM HG (ref 83–108)
PO2 BLDA: 63.4 MM HG (ref 83–108)
PO2 BLDA: 65.4 MM HG (ref 83–108)
PO2 BLDA: 65.5 MM HG (ref 83–108)
PO2 BLDA: 66 MM HG (ref 83–108)
PO2 BLDA: 69.2 MM HG (ref 83–108)
PO2 BLDA: 70.1 MM HG (ref 83–108)
PO2 BLDA: 70.2 MM HG (ref 83–108)
PO2 BLDA: 73.2 MM HG (ref 83–108)
PO2 BLDA: 73.5 MM HG (ref 83–108)
PO2 BLDA: 74 MM HG (ref 83–108)
PO2 BLDA: 74.6 MM HG (ref 83–108)
PO2 BLDA: 75.1 MM HG (ref 83–108)
PO2 BLDA: 75.9 MM HG (ref 83–108)
PO2 BLDA: 76.1 MM HG (ref 83–108)
PO2 BLDA: 79.5 MM HG (ref 83–108)
PO2 BLDA: 81.3 MM HG (ref 83–108)
PO2 BLDA: 82.3 MM HG (ref 83–108)
PO2 BLDA: 87.5 MM HG (ref 83–108)
PO2 BLDA: 91.4 MM HG (ref 83–108)
PO2 BLDA: 93.5 MM HG (ref 83–108)
PO2 BLDA: 94.1 MM HG (ref 83–108)
PO2 BLDA: NORMAL MM[HG]
PO2 TEMP ADJ BLD: 106 MM HG (ref 83–108)
PO2 TEMP ADJ BLD: 107 MM HG (ref 83–108)
PO2 TEMP ADJ BLD: 108 MM HG (ref 83–108)
PO2 TEMP ADJ BLD: 108 MM HG (ref 83–108)
PO2 TEMP ADJ BLD: 114 MM HG (ref 83–108)
PO2 TEMP ADJ BLD: 131 MM HG (ref 83–108)
PO2 TEMP ADJ BLD: 177 MM HG (ref 83–108)
PO2 TEMP ADJ BLD: 213 MM HG (ref 83–108)
PO2 TEMP ADJ BLD: 45.9 MM HG (ref 83–108)
PO2 TEMP ADJ BLD: 61.3 MM HG (ref 83–108)
PO2 TEMP ADJ BLD: 63.4 MM HG (ref 83–108)
PO2 TEMP ADJ BLD: 63.6 MM HG (ref 83–108)
PO2 TEMP ADJ BLD: 64 MM HG (ref 83–108)
PO2 TEMP ADJ BLD: 65.4 MM HG (ref 83–108)
PO2 TEMP ADJ BLD: 65.5 MM HG (ref 83–108)
PO2 TEMP ADJ BLD: 66 MM HG (ref 83–108)
PO2 TEMP ADJ BLD: 69.2 MM HG (ref 83–108)
PO2 TEMP ADJ BLD: 70.1 MM HG (ref 83–108)
PO2 TEMP ADJ BLD: 70.2 MM HG (ref 83–108)
PO2 TEMP ADJ BLD: 73.2 MM HG (ref 83–108)
PO2 TEMP ADJ BLD: 73.5 MM HG (ref 83–108)
PO2 TEMP ADJ BLD: 74 MM HG (ref 83–108)
PO2 TEMP ADJ BLD: 74.6 MM HG (ref 83–108)
PO2 TEMP ADJ BLD: 75.9 MM HG (ref 83–108)
PO2 TEMP ADJ BLD: 79.5 MM HG (ref 83–108)
PO2 TEMP ADJ BLD: 81.3 MM HG (ref 83–108)
PO2 TEMP ADJ BLD: 82.3 MM HG (ref 83–108)
PO2 TEMP ADJ BLD: 87.5 MM HG (ref 83–108)
PO2 TEMP ADJ BLD: 91.4 MM HG (ref 83–108)
PO2 TEMP ADJ BLD: 93.5 MM HG (ref 83–108)
PO2 TEMP ADJ BLD: 94.1 MM HG (ref 83–108)
PO2 TEMP ADJ BLD: NORMAL MM[HG]
POIKILOCYTOSIS BLD QL SMEAR: ABNORMAL
POLYCHROMASIA BLD QL SMEAR: ABNORMAL
POTASSIUM SERPL-SCNC: 3.5 MMOL/L (ref 3.5–5.2)
POTASSIUM SERPL-SCNC: 3.7 MMOL/L (ref 3.5–5.2)
POTASSIUM SERPL-SCNC: 3.8 MMOL/L (ref 3.5–5.2)
POTASSIUM SERPL-SCNC: 3.8 MMOL/L (ref 3.5–5.2)
POTASSIUM SERPL-SCNC: 3.9 MMOL/L (ref 3.5–5.2)
POTASSIUM SERPL-SCNC: 4 MMOL/L (ref 3.5–5.2)
POTASSIUM SERPL-SCNC: 4.1 MMOL/L (ref 3.5–5.2)
POTASSIUM SERPL-SCNC: 4.1 MMOL/L (ref 3.5–5.2)
POTASSIUM SERPL-SCNC: 4.2 MMOL/L (ref 3.5–5.2)
POTASSIUM SERPL-SCNC: 4.2 MMOL/L (ref 3.5–5.2)
POTASSIUM SERPL-SCNC: 4.3 MMOL/L (ref 3.5–5.2)
POTASSIUM SERPL-SCNC: 4.4 MMOL/L (ref 3.5–5.2)
POTASSIUM SERPL-SCNC: 4.4 MMOL/L (ref 3.5–5.2)
POTASSIUM SERPL-SCNC: 4.5 MMOL/L (ref 3.5–5.2)
POTASSIUM SERPL-SCNC: 4.6 MMOL/L (ref 3.5–5.2)
POTASSIUM SERPL-SCNC: 4.6 MMOL/L (ref 3.5–5.2)
POTASSIUM SERPL-SCNC: 4.7 MMOL/L (ref 3.5–5.2)
POTASSIUM SERPL-SCNC: 5.3 MMOL/L (ref 3.5–5.2)
POTASSIUM SERPL-SCNC: 5.3 MMOL/L (ref 3.5–5.2)
PROCALCITONIN SERPL-MCNC: 1.84 NG/ML (ref 0–0.25)
PROCALCITONIN SERPL-MCNC: 27.39 NG/ML (ref 0–0.25)
PROCALCITONIN SERPL-MCNC: 4.56 NG/ML (ref 0–0.25)
PROCALCITONIN SERPL-MCNC: 7.42 NG/ML (ref 0–0.25)
PROCALCITONIN SERPL-MCNC: 7.69 NG/ML (ref 0–0.25)
PROCALCITONIN SERPL-MCNC: 9.78 NG/ML (ref 0–0.25)
PROPOXYPH UR QL: NEGATIVE
PROT SERPL-MCNC: 5.2 G/DL (ref 6–8.5)
PROT SERPL-MCNC: 5.4 G/DL (ref 6–8.5)
PROT SERPL-MCNC: 5.4 G/DL (ref 6–8.5)
PROT SERPL-MCNC: 5.5 G/DL (ref 6–8.5)
PROT SERPL-MCNC: 5.6 G/DL (ref 6–8.5)
PROT SERPL-MCNC: 5.7 G/DL (ref 6–8.5)
PROT SERPL-MCNC: 5.8 G/DL (ref 6–8.5)
PROT SERPL-MCNC: 5.9 G/DL (ref 6–8.5)
PROT SERPL-MCNC: 6.2 G/DL (ref 6–8.5)
PROT SERPL-MCNC: 7 G/DL (ref 6–8.5)
PROT UR QL STRIP: ABNORMAL
PROT UR QL STRIP: NEGATIVE
PROT UR-MCNC: 197.7 MG/DL
PROTHROMBIN TIME: 12.8 SECONDS (ref 11.5–13.4)
PROTHROMBIN TIME: 13.2 SECONDS (ref 11.5–13.4)
PROTHROMBIN TIME: 16.9 SECONDS (ref 11.5–13.4)
PROTHROMBIN TIME: 18.4 SECONDS (ref 11.5–13.4)
PROTHROMBIN TIME: 19.4 SECONDS (ref 11.5–13.4)
PSV: 15 CMH2O
QT INTERVAL: 322 MS
QT INTERVAL: 366 MS
QTC INTERVAL: 423 MS
QTC INTERVAL: 530 MS
RBC # BLD AUTO: 2.11 10*6/MM3 (ref 4.14–5.8)
RBC # BLD AUTO: 2.31 10*6/MM3 (ref 4.14–5.8)
RBC # BLD AUTO: 2.38 10*6/MM3 (ref 4.14–5.8)
RBC # BLD AUTO: 2.4 10*6/MM3 (ref 4.14–5.8)
RBC # BLD AUTO: 2.42 10*6/MM3 (ref 4.14–5.8)
RBC # BLD AUTO: 2.56 10*6/MM3 (ref 4.14–5.8)
RBC # BLD AUTO: 2.59 10*6/MM3 (ref 4.14–5.8)
RBC # BLD AUTO: 2.67 10*6/MM3 (ref 4.14–5.8)
RBC # BLD AUTO: 2.67 10*6/MM3 (ref 4.14–5.8)
RBC # BLD AUTO: 2.69 10*6/MM3 (ref 4.14–5.8)
RBC # BLD AUTO: 2.69 10*6/MM3 (ref 4.14–5.8)
RBC # BLD AUTO: 2.72 10*6/MM3 (ref 4.14–5.8)
RBC # BLD AUTO: 2.73 10*6/MM3 (ref 4.14–5.8)
RBC # BLD AUTO: 2.8 10*6/MM3 (ref 4.14–5.8)
RBC # BLD AUTO: 2.83 10*6/MM3 (ref 4.14–5.8)
RBC # BLD AUTO: 2.9 10*6/MM3 (ref 4.14–5.8)
RBC # BLD AUTO: 2.93 10*6/MM3 (ref 4.14–5.8)
RBC # BLD AUTO: 2.93 10*6/MM3 (ref 4.14–5.8)
RBC # BLD AUTO: 3.82 10*6/MM3 (ref 4.14–5.8)
RBC # UR: ABNORMAL /HPF
REF LAB TEST METHOD: ABNORMAL
RH BLD: POSITIVE
S PNEUM AG SPEC QL LA: NEGATIVE
SAO2 % BLDCOA: 81.7 % (ref 94–99)
SAO2 % BLDCOA: 90.7 % (ref 94–99)
SAO2 % BLDCOA: 90.8 % (ref 94–99)
SAO2 % BLDCOA: 91.3 % (ref 94–99)
SAO2 % BLDCOA: 91.8 % (ref 94–99)
SAO2 % BLDCOA: 92 % (ref 94–99)
SAO2 % BLDCOA: 92.6 % (ref 94–99)
SAO2 % BLDCOA: 93 % (ref 94–99)
SAO2 % BLDCOA: 93.4 % (ref 94–99)
SAO2 % BLDCOA: 93.7 % (ref 94–99)
SAO2 % BLDCOA: 94.3 % (ref 94–99)
SAO2 % BLDCOA: 94.7 % (ref 94–99)
SAO2 % BLDCOA: 95.6 % (ref 94–99)
SAO2 % BLDCOA: 95.8 % (ref 94–99)
SAO2 % BLDCOA: 95.9 % (ref 94–99)
SAO2 % BLDCOA: 96.1 % (ref 94–99)
SAO2 % BLDCOA: 96.2 % (ref 94–99)
SAO2 % BLDCOA: 96.5 % (ref 94–99)
SAO2 % BLDCOA: 96.6 % (ref 94–99)
SAO2 % BLDCOA: 96.7 % (ref 94–99)
SAO2 % BLDCOA: 97.3 % (ref 94–99)
SAO2 % BLDCOA: 97.3 % (ref 94–99)
SAO2 % BLDCOA: 97.4 % (ref 94–99)
SAO2 % BLDCOA: 97.7 % (ref 94–99)
SAO2 % BLDCOA: 98.3 % (ref 94–99)
SAO2 % BLDCOA: 98.3 % (ref 94–99)
SAO2 % BLDCOA: 98.7 % (ref 94–99)
SAO2 % BLDCOA: 98.9 % (ref 94–99)
SAO2 % BLDCOA: 98.9 % (ref 94–99)
SAO2 % BLDCOA: 99.7 % (ref 94–99)
SAO2 % BLDCOA: ABNORMAL %
SAO2 % BLDCOA: NORMAL %
SARS-COV-2 RNA PNL SPEC NAA+PROBE: NOT DETECTED
SERVICE CMNT-IMP: ABNORMAL
SET MECH RESP RATE: 10
SET MECH RESP RATE: 10
SET MECH RESP RATE: 18
SET MECH RESP RATE: 20
SET MECH RESP RATE: 22
SET MECH RESP RATE: 24
SET MECH RESP RATE: 25
SET MECH RESP RATE: 25
SET MECH RESP RATE: 26
SET MECH RESP RATE: NORMAL
SODIUM SERPL-SCNC: 129 MMOL/L (ref 136–145)
SODIUM SERPL-SCNC: 131 MMOL/L (ref 136–145)
SODIUM SERPL-SCNC: 132 MMOL/L (ref 136–145)
SODIUM SERPL-SCNC: 132 MMOL/L (ref 136–145)
SODIUM SERPL-SCNC: 133 MMOL/L (ref 136–145)
SODIUM SERPL-SCNC: 134 MMOL/L (ref 136–145)
SODIUM SERPL-SCNC: 135 MMOL/L (ref 136–145)
SODIUM SERPL-SCNC: 137 MMOL/L (ref 136–145)
SODIUM SERPL-SCNC: 138 MMOL/L (ref 136–145)
SODIUM SERPL-SCNC: 138 MMOL/L (ref 136–145)
SODIUM SERPL-SCNC: 140 MMOL/L (ref 136–145)
SODIUM SERPL-SCNC: 141 MMOL/L (ref 136–145)
SODIUM SERPL-SCNC: 143 MMOL/L (ref 136–145)
SODIUM SERPL-SCNC: 144 MMOL/L (ref 136–145)
SODIUM UR-SCNC: 37 MMOL/L
SP GR UR STRIP: 1.01 (ref 1–1.03)
SP GR UR STRIP: 1.02 (ref 1–1.03)
SP GR UR STRIP: 1.03 (ref 1–1.03)
SP GR UR STRIP: >1.03 (ref 1–1.03)
SQUAMOUS #/AREA URNS HPF: ABNORMAL /HPF
STRESS TARGET HR: 139 BPM
STRESS TARGET HR: 139 BPM
T&S EXPIRATION DATE: NORMAL
T4 FREE SERPL-MCNC: 0.85 NG/DL (ref 0.93–1.7)
TB SKIN TEST: NEGATIVE
TIBC SERPL-MCNC: 127 MCG/DL (ref 298–536)
TOXIC GRANULATION: ABNORMAL
TRANSFERRIN SERPL-MCNC: 85 MG/DL (ref 200–360)
TRICYCLICS UR QL SCN: NEGATIVE
TROPONIN T SERPL-MCNC: <0.01 NG/ML (ref 0–0.03)
TSH SERPL DL<=0.05 MIU/L-ACNC: 3.11 UIU/ML (ref 0.27–4.2)
TSH SERPL DL<=0.05 MIU/L-ACNC: 3.16 UIU/ML (ref 0.27–4.2)
TSPOT INTERPRETATION: NEGATIVE
TSPOT NIL CONTROL INTERPRETATION: NORMAL
TSPOT PANEL A: 0
TSPOT PANEL B: 0
TSPOT POS CONTROL INTERPRETATION: NORMAL
UNIT  ABO: NORMAL
UNIT  RH: NORMAL
URATE SERPL-MCNC: 4.4 MG/DL (ref 3.4–7)
URATE SERPL-MCNC: 5 MG/DL (ref 3.4–7)
URINE MYOGLOBIN, QUALITATIVE: POSITIVE
UROBILINOGEN UR QL STRIP: ABNORMAL
VANCOMYCIN SERPL-MCNC: 17.9 MCG/ML (ref 5–40)
VANCOMYCIN TROUGH SERPL-MCNC: 18.3 MCG/ML (ref 5–20)
VANCOMYCIN TROUGH SERPL-MCNC: 26 MCG/ML (ref 5–20)
VENTILATOR MODE: ABNORMAL
VENTILATOR MODE: AC
VENTILATOR MODE: NORMAL
VENTILATOR MODE: NORMAL
VT ON VENT VENT: 500 ML
VT ON VENT VENT: 550 ML
VT ON VENT VENT: 600 ML
VT ON VENT VENT: 713 ML
VT ON VENT VENT: NORMAL ML
WBC # BLD AUTO: 10.03 10*3/MM3 (ref 3.4–10.8)
WBC # BLD AUTO: 10.4 10*3/MM3 (ref 3.4–10.8)
WBC # BLD AUTO: 10.58 10*3/MM3 (ref 3.4–10.8)
WBC # BLD AUTO: 10.85 10*3/MM3 (ref 3.4–10.8)
WBC # BLD AUTO: 11.05 10*3/MM3 (ref 3.4–10.8)
WBC # BLD AUTO: 11.5 10*3/MM3 (ref 3.4–10.8)
WBC # BLD AUTO: 11.93 10*3/MM3 (ref 3.4–10.8)
WBC # BLD AUTO: 13.75 10*3/MM3 (ref 3.4–10.8)
WBC # BLD AUTO: 14.93 10*3/MM3 (ref 3.4–10.8)
WBC # BLD AUTO: 17.95 10*3/MM3 (ref 3.4–10.8)
WBC # BLD AUTO: 18.6 10*3/MM3 (ref 3.4–10.8)
WBC # BLD AUTO: 19.21 10*3/MM3 (ref 3.4–10.8)
WBC # BLD AUTO: 22.4 10*3/MM3 (ref 3.4–10.8)
WBC # BLD AUTO: 27.87 10*3/MM3 (ref 3.4–10.8)
WBC # BLD AUTO: 7.39 10*3/MM3 (ref 3.4–10.8)
WBC # BLD AUTO: 7.62 10*3/MM3 (ref 3.4–10.8)
WBC # BLD AUTO: 8.02 10*3/MM3 (ref 3.4–10.8)
WBC # BLD AUTO: 9.27 10*3/MM3 (ref 3.4–10.8)
WBC # BLD AUTO: 9.95 10*3/MM3 (ref 3.4–10.8)
WBC UR QL AUTO: ABNORMAL /HPF
WHOLE BLOOD HOLD SPECIMEN: NORMAL
YEAST URNS QL MICRO: ABNORMAL /HPF

## 2021-01-01 PROCEDURE — 94003 VENT MGMT INPAT SUBQ DAY: CPT

## 2021-01-01 PROCEDURE — 86901 BLOOD TYPING SEROLOGIC RH(D): CPT | Performed by: INTERNAL MEDICINE

## 2021-01-01 PROCEDURE — 25010000002 ENOXAPARIN PER 10 MG: Performed by: INTERNAL MEDICINE

## 2021-01-01 PROCEDURE — 63710000001 INSULIN REGULAR HUMAN PER 5 UNITS: Performed by: INTERNAL MEDICINE

## 2021-01-01 PROCEDURE — 85018 HEMOGLOBIN: CPT | Performed by: INTERNAL MEDICINE

## 2021-01-01 PROCEDURE — 82803 BLOOD GASES ANY COMBINATION: CPT

## 2021-01-01 PROCEDURE — 87070 CULTURE OTHR SPECIMN AEROBIC: CPT | Performed by: INTERNAL MEDICINE

## 2021-01-01 PROCEDURE — 84484 ASSAY OF TROPONIN QUANT: CPT | Performed by: EMERGENCY MEDICINE

## 2021-01-01 PROCEDURE — 84145 PROCALCITONIN (PCT): CPT | Performed by: INTERNAL MEDICINE

## 2021-01-01 PROCEDURE — 94799 UNLISTED PULMONARY SVC/PX: CPT

## 2021-01-01 PROCEDURE — 99291 CRITICAL CARE FIRST HOUR: CPT | Performed by: PSYCHIATRY & NEUROLOGY

## 2021-01-01 PROCEDURE — 0DH67UZ INSERTION OF FEEDING DEVICE INTO STOMACH, VIA NATURAL OR ARTIFICIAL OPENING: ICD-10-PCS | Performed by: INTERNAL MEDICINE

## 2021-01-01 PROCEDURE — 93321 DOPPLER ECHO F-UP/LMTD STD: CPT | Performed by: EMERGENCY MEDICINE

## 2021-01-01 PROCEDURE — 82962 GLUCOSE BLOOD TEST: CPT

## 2021-01-01 PROCEDURE — 85027 COMPLETE CBC AUTOMATED: CPT | Performed by: INTERNAL MEDICINE

## 2021-01-01 PROCEDURE — 93321 DOPPLER ECHO F-UP/LMTD STD: CPT | Performed by: INTERNAL MEDICINE

## 2021-01-01 PROCEDURE — 25010000002 HEPARIN (PORCINE) PER 1000 UNITS: Performed by: INTERNAL MEDICINE

## 2021-01-01 PROCEDURE — 93325 DOPPLER ECHO COLOR FLOW MAPG: CPT | Performed by: INTERNAL MEDICINE

## 2021-01-01 PROCEDURE — 85730 THROMBOPLASTIN TIME PARTIAL: CPT | Performed by: INTERNAL MEDICINE

## 2021-01-01 PROCEDURE — 93005 ELECTROCARDIOGRAM TRACING: CPT | Performed by: INTERNAL MEDICINE

## 2021-01-01 PROCEDURE — 94640 AIRWAY INHALATION TREATMENT: CPT

## 2021-01-01 PROCEDURE — 80048 BASIC METABOLIC PNL TOTAL CA: CPT | Performed by: INTERNAL MEDICINE

## 2021-01-01 PROCEDURE — 83880 ASSAY OF NATRIURETIC PEPTIDE: CPT | Performed by: EMERGENCY MEDICINE

## 2021-01-01 PROCEDURE — 71045 X-RAY EXAM CHEST 1 VIEW: CPT

## 2021-01-01 PROCEDURE — 86480 TB TEST CELL IMMUN MEASURE: CPT | Performed by: INTERNAL MEDICINE

## 2021-01-01 PROCEDURE — 25010000002 HYDROMORPHONE PER 4 MG: Performed by: INTERNAL MEDICINE

## 2021-01-01 PROCEDURE — 80202 ASSAY OF VANCOMYCIN: CPT | Performed by: INTERNAL MEDICINE

## 2021-01-01 PROCEDURE — 80306 DRUG TEST PRSMV INSTRMNT: CPT | Performed by: EMERGENCY MEDICINE

## 2021-01-01 PROCEDURE — 84550 ASSAY OF BLOOD/URIC ACID: CPT | Performed by: NURSE PRACTITIONER

## 2021-01-01 PROCEDURE — 92950 HEART/LUNG RESUSCITATION CPR: CPT

## 2021-01-01 PROCEDURE — 83036 HEMOGLOBIN GLYCOSYLATED A1C: CPT | Performed by: INTERNAL MEDICINE

## 2021-01-01 PROCEDURE — 0BCB8ZZ EXTIRPATION OF MATTER FROM LEFT LOWER LOBE BRONCHUS, VIA NATURAL OR ARTIFICIAL OPENING ENDOSCOPIC: ICD-10-PCS | Performed by: INTERNAL MEDICINE

## 2021-01-01 PROCEDURE — 93321 DOPPLER ECHO F-UP/LMTD STD: CPT

## 2021-01-01 PROCEDURE — 87205 SMEAR GRAM STAIN: CPT | Performed by: INTERNAL MEDICINE

## 2021-01-01 PROCEDURE — 85025 COMPLETE CBC W/AUTO DIFF WBC: CPT | Performed by: INTERNAL MEDICINE

## 2021-01-01 PROCEDURE — 87116 MYCOBACTERIA CULTURE: CPT | Performed by: INTERNAL MEDICINE

## 2021-01-01 PROCEDURE — 87206 SMEAR FLUORESCENT/ACID STAI: CPT | Performed by: INTERNAL MEDICINE

## 2021-01-01 PROCEDURE — 99233 SBSQ HOSP IP/OBS HIGH 50: CPT | Performed by: INTERNAL MEDICINE

## 2021-01-01 PROCEDURE — 25010000002 LORAZEPAM PER 2 MG: Performed by: INTERNAL MEDICINE

## 2021-01-01 PROCEDURE — 25010000002 THIAMINE PER 100 MG: Performed by: INTERNAL MEDICINE

## 2021-01-01 PROCEDURE — 93010 ELECTROCARDIOGRAM REPORT: CPT | Performed by: INTERNAL MEDICINE

## 2021-01-01 PROCEDURE — 25010000002 ATROPINE PER 0.01 MG: Performed by: EMERGENCY MEDICINE

## 2021-01-01 PROCEDURE — B543ZZA ULTRASONOGRAPHY OF RIGHT JUGULAR VEINS, GUIDANCE: ICD-10-PCS | Performed by: INTERNAL MEDICINE

## 2021-01-01 PROCEDURE — 86901 BLOOD TYPING SEROLOGIC RH(D): CPT | Performed by: EMERGENCY MEDICINE

## 2021-01-01 PROCEDURE — 86140 C-REACTIVE PROTEIN: CPT | Performed by: EMERGENCY MEDICINE

## 2021-01-01 PROCEDURE — 25010000002 FUROSEMIDE PER 20 MG: Performed by: INTERNAL MEDICINE

## 2021-01-01 PROCEDURE — 87040 BLOOD CULTURE FOR BACTERIA: CPT | Performed by: INTERNAL MEDICINE

## 2021-01-01 PROCEDURE — 25010000002 LORAZEPAM PER 2 MG: Performed by: CLINICAL NURSE SPECIALIST

## 2021-01-01 PROCEDURE — 03HC33Z INSERTION OF INFUSION DEVICE INTO LEFT RADIAL ARTERY, PERCUTANEOUS APPROACH: ICD-10-PCS | Performed by: INTERNAL MEDICINE

## 2021-01-01 PROCEDURE — 25010000002 CEFTAROLINE FOSAMIL PER 10 MG: Performed by: INTERNAL MEDICINE

## 2021-01-01 PROCEDURE — 87077 CULTURE AEROBIC IDENTIFY: CPT | Performed by: EMERGENCY MEDICINE

## 2021-01-01 PROCEDURE — 25010000002 PIPERACILLIN SOD-TAZOBACTAM PER 1 G: Performed by: INTERNAL MEDICINE

## 2021-01-01 PROCEDURE — P9016 RBC LEUKOCYTES REDUCED: HCPCS

## 2021-01-01 PROCEDURE — 99232 SBSQ HOSP IP/OBS MODERATE 35: CPT | Performed by: PSYCHIATRY & NEUROLOGY

## 2021-01-01 PROCEDURE — 81001 URINALYSIS AUTO W/SCOPE: CPT | Performed by: INTERNAL MEDICINE

## 2021-01-01 PROCEDURE — 25010000002 VANCOMYCIN PER 500 MG: Performed by: INTERNAL MEDICINE

## 2021-01-01 PROCEDURE — 5A1955Z RESPIRATORY VENTILATION, GREATER THAN 96 CONSECUTIVE HOURS: ICD-10-PCS | Performed by: INTERNAL MEDICINE

## 2021-01-01 PROCEDURE — 25010000002 CEFEPIME PER 500 MG: Performed by: INTERNAL MEDICINE

## 2021-01-01 PROCEDURE — 36600 WITHDRAWAL OF ARTERIAL BLOOD: CPT

## 2021-01-01 PROCEDURE — 63710000001 INSULIN DETEMIR PER 5 UNITS: Performed by: INTERNAL MEDICINE

## 2021-01-01 PROCEDURE — 25010000002 VANCOMYCIN 10 G RECONSTITUTED SOLUTION: Performed by: EMERGENCY MEDICINE

## 2021-01-01 PROCEDURE — 25010000003 HYDROMORPHONE 1 MG/ML SOLUTION: Performed by: INTERNAL MEDICINE

## 2021-01-01 PROCEDURE — 86900 BLOOD TYPING SEROLOGIC ABO: CPT

## 2021-01-01 PROCEDURE — 93882 EXTRACRANIAL UNI/LTD STUDY: CPT

## 2021-01-01 PROCEDURE — 84100 ASSAY OF PHOSPHORUS: CPT | Performed by: INTERNAL MEDICINE

## 2021-01-01 PROCEDURE — 74018 RADEX ABDOMEN 1 VIEW: CPT

## 2021-01-01 PROCEDURE — 93356 MYOCRD STRAIN IMG SPCKL TRCK: CPT

## 2021-01-01 PROCEDURE — 87086 URINE CULTURE/COLONY COUNT: CPT | Performed by: INTERNAL MEDICINE

## 2021-01-01 PROCEDURE — 87186 SC STD MICRODIL/AGAR DIL: CPT | Performed by: EMERGENCY MEDICINE

## 2021-01-01 PROCEDURE — 25010000002 MIDAZOLAM HCL (PF) 5 MG/5ML SOLUTION: Performed by: INTERNAL MEDICINE

## 2021-01-01 PROCEDURE — 99291 CRITICAL CARE FIRST HOUR: CPT

## 2021-01-01 PROCEDURE — 80053 COMPREHEN METABOLIC PANEL: CPT | Performed by: INTERNAL MEDICINE

## 2021-01-01 PROCEDURE — 25010000002 PROPOFOL 10 MG/ML EMULSION: Performed by: INTERNAL MEDICINE

## 2021-01-01 PROCEDURE — 83735 ASSAY OF MAGNESIUM: CPT | Performed by: INTERNAL MEDICINE

## 2021-01-01 PROCEDURE — 25010000002 VORICONAZOLE PER 10 MG: Performed by: INTERNAL MEDICINE

## 2021-01-01 PROCEDURE — 82570 ASSAY OF URINE CREATININE: CPT | Performed by: NURSE PRACTITIONER

## 2021-01-01 PROCEDURE — 87899 AGENT NOS ASSAY W/OPTIC: CPT | Performed by: INTERNAL MEDICINE

## 2021-01-01 PROCEDURE — 99233 SBSQ HOSP IP/OBS HIGH 50: CPT | Performed by: PSYCHIATRY & NEUROLOGY

## 2021-01-01 PROCEDURE — 05HM33Z INSERTION OF INFUSION DEVICE INTO RIGHT INTERNAL JUGULAR VEIN, PERCUTANEOUS APPROACH: ICD-10-PCS | Performed by: INTERNAL MEDICINE

## 2021-01-01 PROCEDURE — 86900 BLOOD TYPING SEROLOGIC ABO: CPT | Performed by: EMERGENCY MEDICINE

## 2021-01-01 PROCEDURE — 83874 ASSAY OF MYOGLOBIN: CPT | Performed by: INTERNAL MEDICINE

## 2021-01-01 PROCEDURE — 76775 US EXAM ABDO BACK WALL LIM: CPT

## 2021-01-01 PROCEDURE — 84100 ASSAY OF PHOSPHORUS: CPT | Performed by: NURSE PRACTITIONER

## 2021-01-01 PROCEDURE — 86923 COMPATIBILITY TEST ELECTRIC: CPT

## 2021-01-01 PROCEDURE — 0 GADOBENATE DIMEGLUMINE 529 MG/ML SOLUTION: Performed by: INTERNAL MEDICINE

## 2021-01-01 PROCEDURE — 99233 SBSQ HOSP IP/OBS HIGH 50: CPT | Performed by: CLINICAL NURSE SPECIALIST

## 2021-01-01 PROCEDURE — 85610 PROTHROMBIN TIME: CPT | Performed by: INTERNAL MEDICINE

## 2021-01-01 PROCEDURE — 80053 COMPREHEN METABOLIC PANEL: CPT | Performed by: EMERGENCY MEDICINE

## 2021-01-01 PROCEDURE — 85025 COMPLETE CBC W/AUTO DIFF WBC: CPT | Performed by: EMERGENCY MEDICINE

## 2021-01-01 PROCEDURE — 94002 VENT MGMT INPAT INIT DAY: CPT

## 2021-01-01 PROCEDURE — 93308 TTE F-UP OR LMTD: CPT

## 2021-01-01 PROCEDURE — 83540 ASSAY OF IRON: CPT | Performed by: INTERNAL MEDICINE

## 2021-01-01 PROCEDURE — 86850 RBC ANTIBODY SCREEN: CPT | Performed by: INTERNAL MEDICINE

## 2021-01-01 PROCEDURE — 36430 TRANSFUSION BLD/BLD COMPNT: CPT

## 2021-01-01 PROCEDURE — 3E0G76Z INTRODUCTION OF NUTRITIONAL SUBSTANCE INTO UPPER GI, VIA NATURAL OR ARTIFICIAL OPENING: ICD-10-PCS | Performed by: INTERNAL MEDICINE

## 2021-01-01 PROCEDURE — 70553 MRI BRAIN STEM W/O & W/DYE: CPT

## 2021-01-01 PROCEDURE — 25010000002 IRON SUCROSE PER 1 MG: Performed by: INTERNAL MEDICINE

## 2021-01-01 PROCEDURE — 93325 DOPPLER ECHO COLOR FLOW MAPG: CPT

## 2021-01-01 PROCEDURE — 25010000002 FLUCONAZOLE PER 200 MG: Performed by: INTERNAL MEDICINE

## 2021-01-01 PROCEDURE — 25010000002 EPINEPHRINE 1 MG/10ML SOLUTION PREFILLED SYRINGE: Performed by: EMERGENCY MEDICINE

## 2021-01-01 PROCEDURE — 83735 ASSAY OF MAGNESIUM: CPT | Performed by: NURSE PRACTITIONER

## 2021-01-01 PROCEDURE — 03HB33Z INSERTION OF INFUSION DEVICE INTO RIGHT RADIAL ARTERY, PERCUTANEOUS APPROACH: ICD-10-PCS | Performed by: INTERNAL MEDICINE

## 2021-01-01 PROCEDURE — 99232 SBSQ HOSP IP/OBS MODERATE 35: CPT | Performed by: CLINICAL NURSE SPECIALIST

## 2021-01-01 PROCEDURE — 05HN33Z INSERTION OF INFUSION DEVICE INTO LEFT INTERNAL JUGULAR VEIN, PERCUTANEOUS APPROACH: ICD-10-PCS | Performed by: INTERNAL MEDICINE

## 2021-01-01 PROCEDURE — 87186 SC STD MICRODIL/AGAR DIL: CPT | Performed by: INTERNAL MEDICINE

## 2021-01-01 PROCEDURE — 0 IOPAMIDOL PER 1 ML: Performed by: EMERGENCY MEDICINE

## 2021-01-01 PROCEDURE — 87147 CULTURE TYPE IMMUNOLOGIC: CPT | Performed by: INTERNAL MEDICINE

## 2021-01-01 PROCEDURE — G0432 EIA HIV-1/HIV-2 SCREEN: HCPCS | Performed by: INTERNAL MEDICINE

## 2021-01-01 PROCEDURE — 86706 HEP B SURFACE ANTIBODY: CPT | Performed by: INTERNAL MEDICINE

## 2021-01-01 PROCEDURE — 70450 CT HEAD/BRAIN W/O DYE: CPT

## 2021-01-01 PROCEDURE — 25010000002 LEVOFLOXACIN PER 250 MG: Performed by: INTERNAL MEDICINE

## 2021-01-01 PROCEDURE — 25010000002 CEFEPIME PER 500 MG: Performed by: EMERGENCY MEDICINE

## 2021-01-01 PROCEDURE — 25010000002 EPINEPHRINE 1 MG/10ML SOLUTION PREFILLED SYRINGE

## 2021-01-01 PROCEDURE — 86900 BLOOD TYPING SEROLOGIC ABO: CPT | Performed by: INTERNAL MEDICINE

## 2021-01-01 PROCEDURE — 93325 DOPPLER ECHO COLOR FLOW MAPG: CPT | Performed by: EMERGENCY MEDICINE

## 2021-01-01 PROCEDURE — 95819 EEG AWAKE AND ASLEEP: CPT | Performed by: PSYCHIATRY & NEUROLOGY

## 2021-01-01 PROCEDURE — 25010000002 PROPOFOL 10 MG/ML EMULSION: Performed by: EMERGENCY MEDICINE

## 2021-01-01 PROCEDURE — 84156 ASSAY OF PROTEIN URINE: CPT | Performed by: NURSE PRACTITIONER

## 2021-01-01 PROCEDURE — 87086 URINE CULTURE/COLONY COUNT: CPT | Performed by: EMERGENCY MEDICINE

## 2021-01-01 PROCEDURE — 84443 ASSAY THYROID STIM HORMONE: CPT | Performed by: INTERNAL MEDICINE

## 2021-01-01 PROCEDURE — 93005 ELECTROCARDIOGRAM TRACING: CPT | Performed by: EMERGENCY MEDICINE

## 2021-01-01 PROCEDURE — 82272 OCCULT BLD FECES 1-3 TESTS: CPT | Performed by: INTERNAL MEDICINE

## 2021-01-01 PROCEDURE — 86850 RBC ANTIBODY SCREEN: CPT | Performed by: EMERGENCY MEDICINE

## 2021-01-01 PROCEDURE — 25010000002 AZITHROMYCIN PER 500 MG: Performed by: EMERGENCY MEDICINE

## 2021-01-01 PROCEDURE — 82140 ASSAY OF AMMONIA: CPT | Performed by: PSYCHIATRY & NEUROLOGY

## 2021-01-01 PROCEDURE — 31500 INSERT EMERGENCY AIRWAY: CPT

## 2021-01-01 PROCEDURE — 82550 ASSAY OF CK (CPK): CPT | Performed by: INTERNAL MEDICINE

## 2021-01-01 PROCEDURE — 93308 TTE F-UP OR LMTD: CPT | Performed by: INTERNAL MEDICINE

## 2021-01-01 PROCEDURE — 05HY33Z INSERTION OF INFUSION DEVICE INTO UPPER VEIN, PERCUTANEOUS APPROACH: ICD-10-PCS | Performed by: EMERGENCY MEDICINE

## 2021-01-01 PROCEDURE — 31645 BRNCHSC W/THER ASPIR 1ST: CPT | Performed by: INTERNAL MEDICINE

## 2021-01-01 PROCEDURE — 85014 HEMATOCRIT: CPT | Performed by: INTERNAL MEDICINE

## 2021-01-01 PROCEDURE — 0BC68ZZ EXTIRPATION OF MATTER FROM RIGHT LOWER LOBE BRONCHUS, VIA NATURAL OR ARTIFICIAL OPENING ENDOSCOPIC: ICD-10-PCS | Performed by: INTERNAL MEDICINE

## 2021-01-01 PROCEDURE — 84439 ASSAY OF FREE THYROXINE: CPT | Performed by: INTERNAL MEDICINE

## 2021-01-01 PROCEDURE — 95812 EEG 41-60 MINUTES: CPT

## 2021-01-01 PROCEDURE — 87102 FUNGUS ISOLATION CULTURE: CPT | Performed by: INTERNAL MEDICINE

## 2021-01-01 PROCEDURE — 25010000002 MORPHINE PER 10 MG: Performed by: CLINICAL NURSE SPECIALIST

## 2021-01-01 PROCEDURE — 99497 ADVNCD CARE PLAN 30 MIN: CPT | Performed by: CLINICAL NURSE SPECIALIST

## 2021-01-01 PROCEDURE — 82550 ASSAY OF CK (CPK): CPT | Performed by: NURSE PRACTITIONER

## 2021-01-01 PROCEDURE — 87641 MR-STAPH DNA AMP PROBE: CPT | Performed by: INTERNAL MEDICINE

## 2021-01-01 PROCEDURE — 71275 CT ANGIOGRAPHY CHEST: CPT

## 2021-01-01 PROCEDURE — 86140 C-REACTIVE PROTEIN: CPT | Performed by: INTERNAL MEDICINE

## 2021-01-01 PROCEDURE — 83605 ASSAY OF LACTIC ACID: CPT | Performed by: INTERNAL MEDICINE

## 2021-01-01 PROCEDURE — 25010000002 SUCCINYLCHOLINE PER 20 MG: Performed by: INTERNAL MEDICINE

## 2021-01-01 PROCEDURE — P9612 CATHETERIZE FOR URINE SPEC: HCPCS

## 2021-01-01 PROCEDURE — A9577 INJ MULTIHANCE: HCPCS | Performed by: INTERNAL MEDICINE

## 2021-01-01 PROCEDURE — 0BH17EZ INSERTION OF ENDOTRACHEAL AIRWAY INTO TRACHEA, VIA NATURAL OR ARTIFICIAL OPENING: ICD-10-PCS | Performed by: EMERGENCY MEDICINE

## 2021-01-01 PROCEDURE — 85610 PROTHROMBIN TIME: CPT | Performed by: EMERGENCY MEDICINE

## 2021-01-01 PROCEDURE — C1751 CATH, INF, PER/CENT/MIDLINE: HCPCS

## 2021-01-01 PROCEDURE — 80074 ACUTE HEPATITIS PANEL: CPT | Performed by: INTERNAL MEDICINE

## 2021-01-01 PROCEDURE — 25010000002 FENTANYL CITRATE (PF) 2500 MCG/50ML SOLUTION: Performed by: INTERNAL MEDICINE

## 2021-01-01 PROCEDURE — 86901 BLOOD TYPING SEROLOGIC RH(D): CPT

## 2021-01-01 PROCEDURE — 99223 1ST HOSP IP/OBS HIGH 75: CPT | Performed by: INTERNAL MEDICINE

## 2021-01-01 PROCEDURE — 25010000002 ALBUMIN HUMAN 25% PER 50 ML: Performed by: INTERNAL MEDICINE

## 2021-01-01 PROCEDURE — 99222 1ST HOSP IP/OBS MODERATE 55: CPT | Performed by: CLINICAL NURSE SPECIALIST

## 2021-01-01 PROCEDURE — 84145 PROCALCITONIN (PCT): CPT | Performed by: EMERGENCY MEDICINE

## 2021-01-01 PROCEDURE — 95812 EEG 41-60 MINUTES: CPT | Performed by: PSYCHIATRY & NEUROLOGY

## 2021-01-01 PROCEDURE — 25010000002 FENTANYL CITRATE (PF) 2500 MCG/50ML SOLUTION: Performed by: EMERGENCY MEDICINE

## 2021-01-01 PROCEDURE — 85007 BL SMEAR W/DIFF WBC COUNT: CPT | Performed by: INTERNAL MEDICINE

## 2021-01-01 PROCEDURE — P9047 ALBUMIN (HUMAN), 25%, 50ML: HCPCS | Performed by: INTERNAL MEDICINE

## 2021-01-01 PROCEDURE — 25010000002 PERFLUTREN 6.52 MG/ML SUSPENSION: Performed by: INTERNAL MEDICINE

## 2021-01-01 PROCEDURE — 86481 TB AG RESPONSE T-CELL SUSP: CPT | Performed by: INTERNAL MEDICINE

## 2021-01-01 PROCEDURE — 87635 SARS-COV-2 COVID-19 AMP PRB: CPT | Performed by: EMERGENCY MEDICINE

## 2021-01-01 PROCEDURE — 70551 MRI BRAIN STEM W/O DYE: CPT

## 2021-01-01 PROCEDURE — 87106 FUNGI IDENTIFICATION YEAST: CPT | Performed by: INTERNAL MEDICINE

## 2021-01-01 PROCEDURE — 87040 BLOOD CULTURE FOR BACTERIA: CPT | Performed by: EMERGENCY MEDICINE

## 2021-01-01 PROCEDURE — 84300 ASSAY OF URINE SODIUM: CPT | Performed by: INTERNAL MEDICINE

## 2021-01-01 PROCEDURE — 0BC78ZZ EXTIRPATION OF MATTER FROM LEFT MAIN BRONCHUS, VIA NATURAL OR ARTIFICIAL OPENING ENDOSCOPIC: ICD-10-PCS | Performed by: INTERNAL MEDICINE

## 2021-01-01 PROCEDURE — 81001 URINALYSIS AUTO W/SCOPE: CPT | Performed by: EMERGENCY MEDICINE

## 2021-01-01 PROCEDURE — 84466 ASSAY OF TRANSFERRIN: CPT | Performed by: INTERNAL MEDICINE

## 2021-01-01 PROCEDURE — 83605 ASSAY OF LACTIC ACID: CPT | Performed by: EMERGENCY MEDICINE

## 2021-01-01 PROCEDURE — 93356 MYOCRD STRAIN IMG SPCKL TRCK: CPT | Performed by: EMERGENCY MEDICINE

## 2021-01-01 PROCEDURE — 0BC88ZZ EXTIRPATION OF MATTER FROM LEFT UPPER LOBE BRONCHUS, VIA NATURAL OR ARTIFICIAL OPENING ENDOSCOPIC: ICD-10-PCS | Performed by: INTERNAL MEDICINE

## 2021-01-01 PROCEDURE — B544ZZA ULTRASONOGRAPHY OF LEFT JUGULAR VEINS, GUIDANCE: ICD-10-PCS | Performed by: INTERNAL MEDICINE

## 2021-01-01 PROCEDURE — 84550 ASSAY OF BLOOD/URIC ACID: CPT | Performed by: INTERNAL MEDICINE

## 2021-01-01 PROCEDURE — 93308 TTE F-UP OR LMTD: CPT | Performed by: EMERGENCY MEDICINE

## 2021-01-01 PROCEDURE — 5A1D70Z PERFORMANCE OF URINARY FILTRATION, INTERMITTENT, LESS THAN 6 HOURS PER DAY: ICD-10-PCS | Performed by: INTERNAL MEDICINE

## 2021-01-01 PROCEDURE — 95819 EEG AWAKE AND ASLEEP: CPT

## 2021-01-01 RX ORDER — HALOPERIDOL 5 MG/ML
2 INJECTION INTRAMUSCULAR EVERY 4 HOURS PRN
Status: DISCONTINUED | OUTPATIENT
Start: 2021-01-01 | End: 2021-01-01 | Stop reason: HOSPADM

## 2021-01-01 RX ORDER — SODIUM CHLORIDE 9 MG/ML
100 INJECTION, SOLUTION INTRAVENOUS CONTINUOUS
Status: DISCONTINUED | OUTPATIENT
Start: 2021-01-01 | End: 2021-01-01

## 2021-01-01 RX ORDER — GLYCOPYRROLATE 0.2 MG/ML
0.4 INJECTION INTRAMUSCULAR; INTRAVENOUS
Status: DISCONTINUED | OUTPATIENT
Start: 2021-01-01 | End: 2021-01-01 | Stop reason: HOSPADM

## 2021-01-01 RX ORDER — ACETAMINOPHEN 325 MG/1
650 TABLET ORAL EVERY 4 HOURS PRN
Status: DISCONTINUED | OUTPATIENT
Start: 2021-01-01 | End: 2021-01-01 | Stop reason: HOSPADM

## 2021-01-01 RX ORDER — FUROSEMIDE 10 MG/ML
20 INJECTION INTRAMUSCULAR; INTRAVENOUS EVERY 6 HOURS PRN
Status: DISCONTINUED | OUTPATIENT
Start: 2021-01-01 | End: 2021-01-01 | Stop reason: HOSPADM

## 2021-01-01 RX ORDER — SODIUM CHLORIDE 0.9 % (FLUSH) 0.9 %
10 SYRINGE (ML) INJECTION AS NEEDED
Status: DISCONTINUED | OUTPATIENT
Start: 2021-01-01 | End: 2021-01-01 | Stop reason: HOSPADM

## 2021-01-01 RX ORDER — HALOPERIDOL 2 MG/ML
2 SOLUTION ORAL EVERY 4 HOURS PRN
Status: DISCONTINUED | OUTPATIENT
Start: 2021-01-01 | End: 2021-01-01 | Stop reason: HOSPADM

## 2021-01-01 RX ORDER — ALBUMIN (HUMAN) 12.5 G/50ML
12.5 SOLUTION INTRAVENOUS AS NEEDED
Status: CANCELLED | OUTPATIENT
Start: 2021-01-01 | End: 2021-01-01

## 2021-01-01 RX ORDER — VANCOMYCIN HYDROCHLORIDE 1 G/200ML
1000 INJECTION, SOLUTION INTRAVENOUS EVERY 12 HOURS
Status: DISCONTINUED | OUTPATIENT
Start: 2021-01-01 | End: 2021-01-01

## 2021-01-01 RX ORDER — GLYCOPYRROLATE 0.2 MG/ML
0.2 INJECTION INTRAMUSCULAR; INTRAVENOUS 4 TIMES DAILY
Status: DISCONTINUED | OUTPATIENT
Start: 2021-01-01 | End: 2021-01-01 | Stop reason: HOSPADM

## 2021-01-01 RX ORDER — GLYCOPYRROLATE 0.2 MG/ML
0.2 INJECTION INTRAMUSCULAR; INTRAVENOUS ONCE
Status: COMPLETED | OUTPATIENT
Start: 2021-01-01 | End: 2021-01-01

## 2021-01-01 RX ORDER — SCOLOPAMINE TRANSDERMAL SYSTEM 1 MG/1
1 PATCH, EXTENDED RELEASE TRANSDERMAL
Status: DISCONTINUED | OUTPATIENT
Start: 2021-01-01 | End: 2021-01-01 | Stop reason: HOSPADM

## 2021-01-01 RX ORDER — MORPHINE SULFATE 20 MG/ML
10 SOLUTION ORAL
Status: DISCONTINUED | OUTPATIENT
Start: 2021-01-01 | End: 2021-01-01 | Stop reason: HOSPADM

## 2021-01-01 RX ORDER — MIDAZOLAM HYDROCHLORIDE 1 MG/ML
5 INJECTION, SOLUTION INTRAMUSCULAR; INTRAVENOUS ONCE
Status: COMPLETED | OUTPATIENT
Start: 2021-01-01 | End: 2021-01-01

## 2021-01-01 RX ORDER — NOREPINEPHRINE BIT/0.9 % NACL 8 MG/250ML
.02-.3 INFUSION BOTTLE (ML) INTRAVENOUS
Status: DISCONTINUED | OUTPATIENT
Start: 2021-01-01 | End: 2021-01-01

## 2021-01-01 RX ORDER — ACETAMINOPHEN 650 MG/1
650 SUPPOSITORY RECTAL EVERY 4 HOURS PRN
Status: DISCONTINUED | OUTPATIENT
Start: 2021-01-01 | End: 2021-01-01 | Stop reason: HOSPADM

## 2021-01-01 RX ORDER — HEPARIN SODIUM 1000 [USP'U]/ML
80 INJECTION, SOLUTION INTRAVENOUS; SUBCUTANEOUS AS NEEDED
Status: DISCONTINUED | OUTPATIENT
Start: 2021-01-01 | End: 2021-01-01

## 2021-01-01 RX ORDER — VECURONIUM BROMIDE 1 MG/ML
10 INJECTION, POWDER, LYOPHILIZED, FOR SOLUTION INTRAVENOUS ONCE
Status: COMPLETED | OUTPATIENT
Start: 2021-01-01 | End: 2021-01-01

## 2021-01-01 RX ORDER — DEXMEDETOMIDINE HYDROCHLORIDE 4 UG/ML
.2-1.5 INJECTION, SOLUTION INTRAVENOUS
Status: DISCONTINUED | OUTPATIENT
Start: 2021-01-01 | End: 2021-01-01

## 2021-01-01 RX ORDER — FLUCONAZOLE 2 MG/ML
100 INJECTION, SOLUTION INTRAVENOUS DAILY
Status: DISCONTINUED | OUTPATIENT
Start: 2021-01-01 | End: 2021-01-01

## 2021-01-01 RX ORDER — EPINEPHRINE 0.1 MG/ML
SYRINGE (ML) INJECTION
Status: COMPLETED | OUTPATIENT
Start: 2021-01-01 | End: 2021-01-01

## 2021-01-01 RX ORDER — GLYCOPYRROLATE 0.2 MG/ML
0.2 INJECTION INTRAMUSCULAR; INTRAVENOUS
Status: DISCONTINUED | OUTPATIENT
Start: 2021-01-01 | End: 2021-01-01 | Stop reason: HOSPADM

## 2021-01-01 RX ORDER — LORAZEPAM 2 MG/ML
1 CONCENTRATE ORAL
Status: DISCONTINUED | OUTPATIENT
Start: 2021-01-01 | End: 2021-01-01 | Stop reason: HOSPADM

## 2021-01-01 RX ORDER — HYDROMORPHONE HYDROCHLORIDE 1 MG/ML
0.5 INJECTION, SOLUTION INTRAMUSCULAR; INTRAVENOUS; SUBCUTANEOUS
Status: DISCONTINUED | OUTPATIENT
Start: 2021-01-01 | End: 2021-01-01

## 2021-01-01 RX ORDER — DIPHENOXYLATE HYDROCHLORIDE AND ATROPINE SULFATE 2.5; .025 MG/1; MG/1
1 TABLET ORAL
Status: DISCONTINUED | OUTPATIENT
Start: 2021-01-01 | End: 2021-01-01 | Stop reason: HOSPADM

## 2021-01-01 RX ORDER — ATROPINE SULFATE 1 MG/ML
INJECTION, SOLUTION INTRAMUSCULAR; INTRAVENOUS; SUBCUTANEOUS
Status: COMPLETED | OUTPATIENT
Start: 2021-01-01 | End: 2021-01-01

## 2021-01-01 RX ORDER — LORAZEPAM 2 MG/ML
2 CONCENTRATE ORAL
Status: DISCONTINUED | OUTPATIENT
Start: 2021-01-01 | End: 2021-01-01 | Stop reason: HOSPADM

## 2021-01-01 RX ORDER — LORAZEPAM 2 MG/ML
1 INJECTION INTRAMUSCULAR EVERY 6 HOURS
Status: DISCONTINUED | OUTPATIENT
Start: 2021-01-01 | End: 2021-01-01 | Stop reason: HOSPADM

## 2021-01-01 RX ORDER — HEPARIN SODIUM 1000 [USP'U]/ML
3200 INJECTION, SOLUTION INTRAVENOUS; SUBCUTANEOUS AS NEEDED
Status: DISCONTINUED | OUTPATIENT
Start: 2021-01-01 | End: 2021-01-01

## 2021-01-01 RX ORDER — DEXTROSE AND SODIUM CHLORIDE 5; .9 G/100ML; G/100ML
100 INJECTION, SOLUTION INTRAVENOUS CONTINUOUS
Status: DISCONTINUED | OUTPATIENT
Start: 2021-01-01 | End: 2021-01-01

## 2021-01-01 RX ORDER — BISACODYL 10 MG
10 SUPPOSITORY, RECTAL RECTAL ONCE
Status: COMPLETED | OUTPATIENT
Start: 2021-01-01 | End: 2021-01-01

## 2021-01-01 RX ORDER — LACTULOSE 20 G/30ML
20 SOLUTION ORAL ONCE
Status: COMPLETED | OUTPATIENT
Start: 2021-01-01 | End: 2021-01-01

## 2021-01-01 RX ORDER — POTASSIUM CHLORIDE 20MEQ/15ML
40 LIQUID (ML) ORAL ONCE
Status: COMPLETED | OUTPATIENT
Start: 2021-01-01 | End: 2021-01-01

## 2021-01-01 RX ORDER — LORAZEPAM 2 MG/ML
1 INJECTION INTRAMUSCULAR
Status: DISCONTINUED | OUTPATIENT
Start: 2021-01-01 | End: 2021-01-01 | Stop reason: HOSPADM

## 2021-01-01 RX ORDER — OXYCODONE HCL 5 MG/5 ML
5 SOLUTION, ORAL ORAL EVERY 6 HOURS PRN
Status: DISCONTINUED | OUTPATIENT
Start: 2021-01-01 | End: 2021-01-01

## 2021-01-01 RX ORDER — LORAZEPAM 2 MG/ML
1 INJECTION INTRAMUSCULAR ONCE
Status: COMPLETED | OUTPATIENT
Start: 2021-01-01 | End: 2021-01-01

## 2021-01-01 RX ORDER — ATORVASTATIN CALCIUM 10 MG/1
10 TABLET, FILM COATED ORAL NIGHTLY
Status: DISCONTINUED | OUTPATIENT
Start: 2021-01-01 | End: 2021-01-01

## 2021-01-01 RX ORDER — ASPIRIN 81 MG/1
81 TABLET, CHEWABLE ORAL DAILY
COMMUNITY

## 2021-01-01 RX ORDER — LEVOFLOXACIN 5 MG/ML
500 INJECTION, SOLUTION INTRAVENOUS
Status: DISCONTINUED | OUTPATIENT
Start: 2021-01-01 | End: 2021-01-01

## 2021-01-01 RX ORDER — PANTOPRAZOLE SODIUM 40 MG/10ML
40 INJECTION, POWDER, LYOPHILIZED, FOR SOLUTION INTRAVENOUS
Status: DISCONTINUED | OUTPATIENT
Start: 2021-01-01 | End: 2021-01-01

## 2021-01-01 RX ORDER — ACETAMINOPHEN 160 MG/5ML
650 SOLUTION ORAL EVERY 4 HOURS PRN
Status: DISCONTINUED | OUTPATIENT
Start: 2021-01-01 | End: 2021-01-01 | Stop reason: HOSPADM

## 2021-01-01 RX ORDER — MIDAZOLAM HYDROCHLORIDE 1 MG/ML
5 INJECTION, SOLUTION INTRAMUSCULAR; INTRAVENOUS ONCE
Status: DISCONTINUED | OUTPATIENT
Start: 2021-01-01 | End: 2021-01-01

## 2021-01-01 RX ORDER — DOXYCYCLINE 100 MG/1
100 TABLET ORAL EVERY 12 HOURS SCHEDULED
Status: DISCONTINUED | OUTPATIENT
Start: 2021-01-01 | End: 2021-01-01

## 2021-01-01 RX ORDER — AMOXICILLIN 250 MG
1 CAPSULE ORAL 2 TIMES DAILY
Status: DISCONTINUED | OUTPATIENT
Start: 2021-01-01 | End: 2021-01-01

## 2021-01-01 RX ORDER — LIDOCAINE 50 MG/G
1 PATCH TOPICAL
Status: DISCONTINUED | OUTPATIENT
Start: 2021-01-01 | End: 2021-01-01

## 2021-01-01 RX ORDER — ALBUMIN (HUMAN) 12.5 G/50ML
12.5 SOLUTION INTRAVENOUS AS NEEDED
Status: DISCONTINUED | OUTPATIENT
Start: 2021-01-01 | End: 2021-01-01

## 2021-01-01 RX ORDER — SODIUM CHLORIDE, SODIUM LACTATE, POTASSIUM CHLORIDE, CALCIUM CHLORIDE 600; 310; 30; 20 MG/100ML; MG/100ML; MG/100ML; MG/100ML
15 INJECTION, SOLUTION INTRAVENOUS CONTINUOUS
Status: DISCONTINUED | OUTPATIENT
Start: 2021-01-01 | End: 2021-01-01

## 2021-01-01 RX ORDER — NICOTINE POLACRILEX 4 MG
15 LOZENGE BUCCAL
Status: DISCONTINUED | OUTPATIENT
Start: 2021-01-01 | End: 2021-01-01

## 2021-01-01 RX ORDER — FLUTICASONE PROPIONATE 50 MCG
2 SPRAY, SUSPENSION (ML) NASAL DAILY
COMMUNITY

## 2021-01-01 RX ORDER — LORAZEPAM 2 MG/ML
2 INJECTION INTRAMUSCULAR
Status: DISCONTINUED | OUTPATIENT
Start: 2021-01-01 | End: 2021-01-01 | Stop reason: HOSPADM

## 2021-01-01 RX ORDER — HEPARIN SODIUM 10000 [USP'U]/100ML
18 INJECTION, SOLUTION INTRAVENOUS
Status: DISCONTINUED | OUTPATIENT
Start: 2021-01-01 | End: 2021-01-01

## 2021-01-01 RX ORDER — FUROSEMIDE 10 MG/ML
40 INJECTION INTRAMUSCULAR; INTRAVENOUS ONCE
Status: COMPLETED | OUTPATIENT
Start: 2021-01-01 | End: 2021-01-01

## 2021-01-01 RX ORDER — POLYETHYLENE GLYCOL 3350 17 G/17G
17 POWDER, FOR SOLUTION ORAL DAILY
Status: DISCONTINUED | OUTPATIENT
Start: 2021-01-01 | End: 2021-01-01

## 2021-01-01 RX ORDER — ROCURONIUM BROMIDE 10 MG/ML
100 INJECTION, SOLUTION INTRAVENOUS ONCE
Status: COMPLETED | OUTPATIENT
Start: 2021-01-01 | End: 2021-01-01

## 2021-01-01 RX ORDER — HEPARIN SODIUM 1000 [USP'U]/ML
40 INJECTION, SOLUTION INTRAVENOUS; SUBCUTANEOUS AS NEEDED
Status: DISCONTINUED | OUTPATIENT
Start: 2021-01-01 | End: 2021-01-01

## 2021-01-01 RX ORDER — DEXTROSE, SODIUM CHLORIDE, SODIUM LACTATE, POTASSIUM CHLORIDE, AND CALCIUM CHLORIDE 5; .6; .31; .03; .02 G/100ML; G/100ML; G/100ML; G/100ML; G/100ML
100 INJECTION, SOLUTION INTRAVENOUS CONTINUOUS
Status: DISCONTINUED | OUTPATIENT
Start: 2021-01-01 | End: 2021-01-01

## 2021-01-01 RX ORDER — MIDAZOLAM HYDROCHLORIDE 1 MG/ML
4 INJECTION, SOLUTION INTRAMUSCULAR; INTRAVENOUS ONCE
Status: COMPLETED | OUTPATIENT
Start: 2021-01-01 | End: 2021-01-01

## 2021-01-01 RX ORDER — SODIUM CHLORIDE 0.9 % (FLUSH) 0.9 %
10 SYRINGE (ML) INJECTION EVERY 12 HOURS SCHEDULED
Status: DISCONTINUED | OUTPATIENT
Start: 2021-01-01 | End: 2021-01-01

## 2021-01-01 RX ORDER — PROCHLORPERAZINE EDISYLATE 5 MG/ML
5 INJECTION INTRAMUSCULAR; INTRAVENOUS EVERY 6 HOURS PRN
Status: DISCONTINUED | OUTPATIENT
Start: 2021-01-01 | End: 2021-01-01 | Stop reason: HOSPADM

## 2021-01-01 RX ORDER — ATROPINE SULFATE 10 MG/ML
2 SOLUTION/ DROPS OPHTHALMIC 2 TIMES DAILY PRN
Status: DISCONTINUED | OUTPATIENT
Start: 2021-01-01 | End: 2021-01-01 | Stop reason: HOSPADM

## 2021-01-01 RX ORDER — HEPARIN SODIUM 1000 [USP'U]/ML
4000 INJECTION, SOLUTION INTRAVENOUS; SUBCUTANEOUS ONCE
Status: COMPLETED | OUTPATIENT
Start: 2021-01-01 | End: 2021-01-01

## 2021-01-01 RX ORDER — DEXTROSE MONOHYDRATE 25 G/50ML
25 INJECTION, SOLUTION INTRAVENOUS
Status: DISCONTINUED | OUTPATIENT
Start: 2021-01-01 | End: 2021-01-01

## 2021-01-01 RX ORDER — VANCOMYCIN HYDROCHLORIDE 1 G/200ML
1000 INJECTION, SOLUTION INTRAVENOUS
Status: DISCONTINUED | OUTPATIENT
Start: 2021-01-01 | End: 2021-01-01

## 2021-01-01 RX ORDER — ACETAMINOPHEN 325 MG/1
650 TABLET ORAL EVERY 4 HOURS PRN
Status: DISCONTINUED | OUTPATIENT
Start: 2021-01-01 | End: 2021-01-01

## 2021-01-01 RX ORDER — LORAZEPAM 2 MG/ML
1 INJECTION INTRAMUSCULAR EVERY 4 HOURS PRN
Status: DISCONTINUED | OUTPATIENT
Start: 2021-01-01 | End: 2021-01-01 | Stop reason: SDUPTHER

## 2021-01-01 RX ORDER — ACETAMINOPHEN 650 MG/1
650 SUPPOSITORY RECTAL EVERY 4 HOURS PRN
Status: DISCONTINUED | OUTPATIENT
Start: 2021-01-01 | End: 2021-01-01

## 2021-01-01 RX ORDER — ONDANSETRON 2 MG/ML
4 INJECTION INTRAMUSCULAR; INTRAVENOUS EVERY 6 HOURS PRN
Status: DISCONTINUED | OUTPATIENT
Start: 2021-01-01 | End: 2021-01-01

## 2021-01-01 RX ORDER — LORAZEPAM 2 MG/ML
1 INJECTION INTRAMUSCULAR EVERY 6 HOURS PRN
Status: DISCONTINUED | OUTPATIENT
Start: 2021-01-01 | End: 2021-01-01

## 2021-01-01 RX ORDER — SODIUM CHLORIDE 9 MG/ML
25 INJECTION, SOLUTION INTRAVENOUS CONTINUOUS
Status: DISCONTINUED | OUTPATIENT
Start: 2021-01-01 | End: 2021-01-01

## 2021-01-01 RX ORDER — SUCCINYLCHOLINE CHLORIDE 20 MG/ML
100 INJECTION INTRAMUSCULAR; INTRAVENOUS ONCE
Status: COMPLETED | OUTPATIENT
Start: 2021-01-01 | End: 2021-01-01

## 2021-01-01 RX ORDER — ALBUMIN (HUMAN) 12.5 G/50ML
25 SOLUTION INTRAVENOUS AS NEEDED
Status: DISCONTINUED | OUTPATIENT
Start: 2021-01-01 | End: 2021-01-01

## 2021-01-01 RX ORDER — PROCHLORPERAZINE 25 MG
25 SUPPOSITORY, RECTAL RECTAL EVERY 12 HOURS PRN
Status: DISCONTINUED | OUTPATIENT
Start: 2021-01-01 | End: 2021-01-01 | Stop reason: HOSPADM

## 2021-01-01 RX ORDER — ONDANSETRON 4 MG/1
4 TABLET, FILM COATED ORAL EVERY 6 HOURS PRN
Status: DISCONTINUED | OUTPATIENT
Start: 2021-01-01 | End: 2021-01-01

## 2021-01-01 RX ORDER — HEPARIN SODIUM 1000 [USP'U]/ML
80 INJECTION, SOLUTION INTRAVENOUS; SUBCUTANEOUS ONCE
Status: COMPLETED | OUTPATIENT
Start: 2021-01-01 | End: 2021-01-01

## 2021-01-01 RX ORDER — OXYCODONE HCL 5 MG/5 ML
5 SOLUTION, ORAL ORAL EVERY 6 HOURS
Status: DISCONTINUED | OUTPATIENT
Start: 2021-01-01 | End: 2021-01-01

## 2021-01-01 RX ORDER — IPRATROPIUM BROMIDE AND ALBUTEROL SULFATE 2.5; .5 MG/3ML; MG/3ML
3 SOLUTION RESPIRATORY (INHALATION)
Status: DISCONTINUED | OUTPATIENT
Start: 2021-01-01 | End: 2021-01-01

## 2021-01-01 RX ORDER — PROCHLORPERAZINE MALEATE 10 MG
5 TABLET ORAL EVERY 6 HOURS PRN
Status: DISCONTINUED | OUTPATIENT
Start: 2021-01-01 | End: 2021-01-01 | Stop reason: HOSPADM

## 2021-01-01 RX ADMIN — DOCUSATE SODIUM 50 MG AND SENNOSIDES 8.6 MG 1 TABLET: 8.6; 5 TABLET, FILM COATED ORAL at 20:21

## 2021-01-01 RX ADMIN — Medication 0.1 MCG/KG/MIN: at 23:51

## 2021-01-01 RX ADMIN — INSULIN DETEMIR 10 UNITS: 100 INJECTION, SOLUTION SUBCUTANEOUS at 08:00

## 2021-01-01 RX ADMIN — FUROSEMIDE 40 MG: 10 INJECTION, SOLUTION INTRAVENOUS at 16:14

## 2021-01-01 RX ADMIN — GLYCOPYRROLATE 0.2 MG: 0.2 INJECTION INTRAMUSCULAR; INTRAVENOUS at 10:35

## 2021-01-01 RX ADMIN — PANTOPRAZOLE SODIUM 40 MG: 40 INJECTION, POWDER, FOR SOLUTION INTRAVENOUS at 05:58

## 2021-01-01 RX ADMIN — IPRATROPIUM BROMIDE AND ALBUTEROL SULFATE 3 ML: 2.5; .5 SOLUTION RESPIRATORY (INHALATION) at 06:17

## 2021-01-01 RX ADMIN — DOCUSATE SODIUM 50 MG AND SENNOSIDES 8.6 MG 1 TABLET: 8.6; 5 TABLET, FILM COATED ORAL at 08:28

## 2021-01-01 RX ADMIN — ENOXAPARIN SODIUM 70 MG: 80 INJECTION SUBCUTANEOUS at 02:46

## 2021-01-01 RX ADMIN — INSULIN HUMAN 2 UNITS: 100 INJECTION, SOLUTION PARENTERAL at 17:24

## 2021-01-01 RX ADMIN — PANTOPRAZOLE SODIUM 40 MG: 40 INJECTION, POWDER, FOR SOLUTION INTRAVENOUS at 06:17

## 2021-01-01 RX ADMIN — HYDROMORPHONE HYDROCHLORIDE 1 MG: 1 INJECTION, SOLUTION INTRAMUSCULAR; INTRAVENOUS; SUBCUTANEOUS at 08:06

## 2021-01-01 RX ADMIN — INSULIN DETEMIR 15 UNITS: 100 INJECTION, SOLUTION SUBCUTANEOUS at 10:35

## 2021-01-01 RX ADMIN — SODIUM CHLORIDE 100 ML/HR: 9 INJECTION, SOLUTION INTRAVENOUS at 21:52

## 2021-01-01 RX ADMIN — IPRATROPIUM BROMIDE AND ALBUTEROL SULFATE 3 ML: 2.5; .5 SOLUTION RESPIRATORY (INHALATION) at 20:06

## 2021-01-01 RX ADMIN — GUAIFENESIN 400 MG: 100 SOLUTION ORAL at 15:36

## 2021-01-01 RX ADMIN — CEFTAROLINE FOSAMIL 200 MG: 400 POWDER, FOR SOLUTION INTRAVENOUS at 06:10

## 2021-01-01 RX ADMIN — APIXABAN 5 MG: 5 TABLET, FILM COATED ORAL at 09:00

## 2021-01-01 RX ADMIN — FLUCONAZOLE 100 MG: 200 INJECTION, SOLUTION INTRAVENOUS at 15:12

## 2021-01-01 RX ADMIN — SODIUM CHLORIDE 100 ML/HR: 9 INJECTION, SOLUTION INTRAVENOUS at 09:09

## 2021-01-01 RX ADMIN — LIDOCAINE 1 PATCH: 50 PATCH CUTANEOUS at 09:10

## 2021-01-01 RX ADMIN — ATROPINE SULFATE 0.5 MG: 1 INJECTION, SOLUTION INTRAMUSCULAR; INTRAVENOUS; SUBCUTANEOUS at 09:40

## 2021-01-01 RX ADMIN — VORICONAZOLE 300 MG: 200 TABLET ORAL at 08:08

## 2021-01-01 RX ADMIN — GUAIFENESIN 200 MG: 100 SOLUTION ORAL at 16:00

## 2021-01-01 RX ADMIN — PROPOFOL 35 MCG/KG/MIN: 10 INJECTION, EMULSION INTRAVENOUS at 13:42

## 2021-01-01 RX ADMIN — ATORVASTATIN CALCIUM 10 MG: 10 TABLET, FILM COATED ORAL at 20:34

## 2021-01-01 RX ADMIN — LIDOCAINE 1 PATCH: 50 PATCH CUTANEOUS at 09:00

## 2021-01-01 RX ADMIN — IPRATROPIUM BROMIDE AND ALBUTEROL SULFATE 3 ML: 2.5; .5 SOLUTION RESPIRATORY (INHALATION) at 14:05

## 2021-01-01 RX ADMIN — DOCUSATE SODIUM 50 MG AND SENNOSIDES 8.6 MG 1 TABLET: 8.6; 5 TABLET, FILM COATED ORAL at 08:08

## 2021-01-01 RX ADMIN — LIDOCAINE 1 PATCH: 50 PATCH CUTANEOUS at 10:12

## 2021-01-01 RX ADMIN — IPRATROPIUM BROMIDE AND ALBUTEROL SULFATE 3 ML: 2.5; .5 SOLUTION RESPIRATORY (INHALATION) at 14:24

## 2021-01-01 RX ADMIN — IPRATROPIUM BROMIDE AND ALBUTEROL SULFATE 3 ML: 2.5; .5 SOLUTION RESPIRATORY (INHALATION) at 20:08

## 2021-01-01 RX ADMIN — INSULIN HUMAN 7 UNITS: 100 INJECTION, SOLUTION PARENTERAL at 11:11

## 2021-01-01 RX ADMIN — APIXABAN 5 MG: 5 TABLET, FILM COATED ORAL at 08:03

## 2021-01-01 RX ADMIN — HEPARIN SODIUM 4000 UNITS: 1000 INJECTION, SOLUTION INTRAVENOUS; SUBCUTANEOUS at 11:06

## 2021-01-01 RX ADMIN — PANTOPRAZOLE SODIUM 40 MG: 40 INJECTION, POWDER, FOR SOLUTION INTRAVENOUS at 05:15

## 2021-01-01 RX ADMIN — DOXYCYCLINE 100 MG: 100 TABLET, FILM COATED ORAL at 21:08

## 2021-01-01 RX ADMIN — VORICONAZOLE 300 MG: 200 TABLET ORAL at 21:21

## 2021-01-01 RX ADMIN — IPRATROPIUM BROMIDE AND ALBUTEROL SULFATE 3 ML: 2.5; .5 SOLUTION RESPIRATORY (INHALATION) at 19:23

## 2021-01-01 RX ADMIN — VORICONAZOLE 300 MG: 200 TABLET ORAL at 12:54

## 2021-01-01 RX ADMIN — SODIUM BICARBONATE 50 MEQ: 84 INJECTION INTRAVENOUS at 10:20

## 2021-01-01 RX ADMIN — LORAZEPAM 1 MG: 2 INJECTION INTRAMUSCULAR; INTRAVENOUS at 10:54

## 2021-01-01 RX ADMIN — DOCUSATE SODIUM 50 MG AND SENNOSIDES 8.6 MG 1 TABLET: 8.6; 5 TABLET, FILM COATED ORAL at 09:10

## 2021-01-01 RX ADMIN — GUAIFENESIN 400 MG: 100 SOLUTION ORAL at 08:12

## 2021-01-01 RX ADMIN — ROCURONIUM BROMIDE 100 MG: 10 INJECTION INTRAVENOUS at 13:59

## 2021-01-01 RX ADMIN — DOCUSATE SODIUM 50 MG AND SENNOSIDES 8.6 MG 1 TABLET: 8.6; 5 TABLET, FILM COATED ORAL at 08:12

## 2021-01-01 RX ADMIN — SODIUM CHLORIDE, SODIUM LACTATE, POTASSIUM CHLORIDE, CALCIUM CHLORIDE AND DEXTROSE MONOHYDRATE 100 ML/HR: 5; 600; 310; 30; 20 INJECTION, SOLUTION INTRAVENOUS at 03:56

## 2021-01-01 RX ADMIN — CEFTAROLINE FOSAMIL 200 MG: 400 POWDER, FOR SOLUTION INTRAVENOUS at 19:22

## 2021-01-01 RX ADMIN — TAZOBACTAM SODIUM AND PIPERACILLIN SODIUM 4.5 G: 500; 4 INJECTION, SOLUTION INTRAVENOUS at 15:24

## 2021-01-01 RX ADMIN — INSULIN DETEMIR 15 UNITS: 100 INJECTION, SOLUTION SUBCUTANEOUS at 20:35

## 2021-01-01 RX ADMIN — VORICONAZOLE 300 MG: 200 TABLET ORAL at 12:18

## 2021-01-01 RX ADMIN — GUAIFENESIN 400 MG: 100 SOLUTION ORAL at 20:18

## 2021-01-01 RX ADMIN — VECURONIUM BROMIDE 10 MG: 1 INJECTION, POWDER, LYOPHILIZED, FOR SOLUTION INTRAVENOUS at 10:32

## 2021-01-01 RX ADMIN — SODIUM CHLORIDE 100 ML/HR: 9 INJECTION, SOLUTION INTRAVENOUS at 23:54

## 2021-01-01 RX ADMIN — INSULIN DETEMIR 10 UNITS: 100 INJECTION, SOLUTION SUBCUTANEOUS at 20:50

## 2021-01-01 RX ADMIN — TAZOBACTAM SODIUM AND PIPERACILLIN SODIUM 4.5 G: 500; 4 INJECTION, SOLUTION INTRAVENOUS at 08:09

## 2021-01-01 RX ADMIN — VORICONAZOLE 300 MG: 200 TABLET ORAL at 08:58

## 2021-01-01 RX ADMIN — LORAZEPAM 1 MG: 2 INJECTION INTRAMUSCULAR; INTRAVENOUS at 02:06

## 2021-01-01 RX ADMIN — IPRATROPIUM BROMIDE AND ALBUTEROL SULFATE 3 ML: 2.5; .5 SOLUTION RESPIRATORY (INHALATION) at 07:02

## 2021-01-01 RX ADMIN — LORAZEPAM 1 MG: 2 INJECTION INTRAMUSCULAR; INTRAVENOUS at 20:38

## 2021-01-01 RX ADMIN — IPRATROPIUM BROMIDE AND ALBUTEROL SULFATE 3 ML: 2.5; .5 SOLUTION RESPIRATORY (INHALATION) at 11:27

## 2021-01-01 RX ADMIN — APIXABAN 5 MG: 5 TABLET, FILM COATED ORAL at 21:21

## 2021-01-01 RX ADMIN — INSULIN HUMAN 7 UNITS: 100 INJECTION, SOLUTION PARENTERAL at 12:10

## 2021-01-01 RX ADMIN — PANTOPRAZOLE SODIUM 40 MG: 40 INJECTION, POWDER, FOR SOLUTION INTRAVENOUS at 05:53

## 2021-01-01 RX ADMIN — TAZOBACTAM SODIUM AND PIPERACILLIN SODIUM 4.5 G: 500; 4 INJECTION, SOLUTION INTRAVENOUS at 08:31

## 2021-01-01 RX ADMIN — BISACODYL 10 MG: 10 SUPPOSITORY RECTAL at 12:00

## 2021-01-01 RX ADMIN — IPRATROPIUM BROMIDE AND ALBUTEROL SULFATE 3 ML: 2.5; .5 SOLUTION RESPIRATORY (INHALATION) at 10:28

## 2021-01-01 RX ADMIN — Medication 0.09 MCG/KG/MIN: at 08:30

## 2021-01-01 RX ADMIN — ATORVASTATIN CALCIUM 10 MG: 10 TABLET, FILM COATED ORAL at 20:27

## 2021-01-01 RX ADMIN — OXYCODONE HYDROCHLORIDE 5 MG: 5 SOLUTION ORAL at 08:08

## 2021-01-01 RX ADMIN — HEPARIN SODIUM 6340 UNITS: 1000 INJECTION, SOLUTION INTRAVENOUS; SUBCUTANEOUS at 10:09

## 2021-01-01 RX ADMIN — SODIUM CHLORIDE 25 ML/HR: 9 INJECTION, SOLUTION INTRAVENOUS at 07:40

## 2021-01-01 RX ADMIN — INSULIN HUMAN 6 UNITS: 100 INJECTION, SOLUTION PARENTERAL at 05:27

## 2021-01-01 RX ADMIN — INSULIN HUMAN 8 UNITS: 100 INJECTION, SOLUTION PARENTERAL at 18:14

## 2021-01-01 RX ADMIN — TAZOBACTAM SODIUM AND PIPERACILLIN SODIUM 4.5 G: 500; 4 INJECTION, SOLUTION INTRAVENOUS at 00:10

## 2021-01-01 RX ADMIN — HYDROMORPHONE HYDROCHLORIDE 1 MG: 1 INJECTION, SOLUTION INTRAMUSCULAR; INTRAVENOUS; SUBCUTANEOUS at 15:29

## 2021-01-01 RX ADMIN — IPRATROPIUM BROMIDE AND ALBUTEROL SULFATE 3 ML: 2.5; .5 SOLUTION RESPIRATORY (INHALATION) at 10:33

## 2021-01-01 RX ADMIN — Medication 0.03 MCG/KG/MIN: at 06:57

## 2021-01-01 RX ADMIN — GUAIFENESIN 200 MG: 100 SOLUTION ORAL at 20:26

## 2021-01-01 RX ADMIN — PANTOPRAZOLE SODIUM 40 MG: 40 INJECTION, POWDER, FOR SOLUTION INTRAVENOUS at 05:34

## 2021-01-01 RX ADMIN — TAZOBACTAM SODIUM AND PIPERACILLIN SODIUM 4.5 G: 500; 4 INJECTION, SOLUTION INTRAVENOUS at 07:31

## 2021-01-01 RX ADMIN — INSULIN HUMAN 4 UNITS: 100 INJECTION, SOLUTION PARENTERAL at 05:51

## 2021-01-01 RX ADMIN — LORAZEPAM 1 MG: 2 INJECTION INTRAMUSCULAR; INTRAVENOUS at 15:25

## 2021-01-01 RX ADMIN — IPRATROPIUM BROMIDE AND ALBUTEROL SULFATE 3 ML: 2.5; .5 SOLUTION RESPIRATORY (INHALATION) at 15:40

## 2021-01-01 RX ADMIN — GUAIFENESIN 200 MG: 100 SOLUTION ORAL at 16:31

## 2021-01-01 RX ADMIN — INSULIN HUMAN 6 UNITS: 100 INJECTION, SOLUTION PARENTERAL at 23:42

## 2021-01-01 RX ADMIN — PROPOFOL 40 MCG/KG/MIN: 10 INJECTION, EMULSION INTRAVENOUS at 02:19

## 2021-01-01 RX ADMIN — GUAIFENESIN 400 MG: 100 SOLUTION ORAL at 08:03

## 2021-01-01 RX ADMIN — ATORVASTATIN CALCIUM 10 MG: 10 TABLET, FILM COATED ORAL at 20:14

## 2021-01-01 RX ADMIN — SODIUM CHLORIDE 125 ML/HR: 9 INJECTION, SOLUTION INTRAVENOUS at 09:52

## 2021-01-01 RX ADMIN — APIXABAN 5 MG: 5 TABLET, FILM COATED ORAL at 20:10

## 2021-01-01 RX ADMIN — SODIUM CHLORIDE 25 ML/HR: 9 INJECTION, SOLUTION INTRAVENOUS at 06:02

## 2021-01-01 RX ADMIN — INSULIN DETEMIR 10 UNITS: 100 INJECTION, SOLUTION SUBCUTANEOUS at 20:28

## 2021-01-01 RX ADMIN — INSULIN HUMAN 4 UNITS: 100 INJECTION, SOLUTION PARENTERAL at 05:21

## 2021-01-01 RX ADMIN — HEPARIN SODIUM 3200 UNITS: 1000 INJECTION, SOLUTION INTRAVENOUS; SUBCUTANEOUS at 12:05

## 2021-01-01 RX ADMIN — INSULIN HUMAN 8 UNITS: 100 INJECTION, SOLUTION PARENTERAL at 00:17

## 2021-01-01 RX ADMIN — LORAZEPAM 1 MG: 2 INJECTION INTRAMUSCULAR; INTRAVENOUS at 00:43

## 2021-01-01 RX ADMIN — GUAIFENESIN 400 MG: 100 SOLUTION ORAL at 20:34

## 2021-01-01 RX ADMIN — LORAZEPAM 1 MG: 2 INJECTION INTRAMUSCULAR; INTRAVENOUS at 09:22

## 2021-01-01 RX ADMIN — DEXTROSE AND SODIUM CHLORIDE 100 ML/HR: 5; 900 INJECTION, SOLUTION INTRAVENOUS at 17:12

## 2021-01-01 RX ADMIN — CEFTAROLINE FOSAMIL 200 MG: 400 POWDER, FOR SOLUTION INTRAVENOUS at 19:40

## 2021-01-01 RX ADMIN — IPRATROPIUM BROMIDE AND ALBUTEROL SULFATE 3 ML: 2.5; .5 SOLUTION RESPIRATORY (INHALATION) at 19:55

## 2021-01-01 RX ADMIN — MIDAZOLAM HYDROCHLORIDE 5 MG: 1 INJECTION, SOLUTION INTRAMUSCULAR; INTRAVENOUS at 19:02

## 2021-01-01 RX ADMIN — INSULIN DETEMIR 10 UNITS: 100 INJECTION, SOLUTION SUBCUTANEOUS at 08:01

## 2021-01-01 RX ADMIN — INSULIN HUMAN 6 UNITS: 100 INJECTION, SOLUTION PARENTERAL at 05:30

## 2021-01-01 RX ADMIN — APIXABAN 5 MG: 5 TABLET, FILM COATED ORAL at 08:12

## 2021-01-01 RX ADMIN — IPRATROPIUM BROMIDE AND ALBUTEROL SULFATE 3 ML: 2.5; .5 SOLUTION RESPIRATORY (INHALATION) at 06:40

## 2021-01-01 RX ADMIN — APIXABAN 5 MG: 5 TABLET, FILM COATED ORAL at 20:38

## 2021-01-01 RX ADMIN — INSULIN DETEMIR 10 UNITS: 100 INJECTION, SOLUTION SUBCUTANEOUS at 20:19

## 2021-01-01 RX ADMIN — HYDROMORPHONE HYDROCHLORIDE 1 MG: 1 INJECTION, SOLUTION INTRAMUSCULAR; INTRAVENOUS; SUBCUTANEOUS at 08:22

## 2021-01-01 RX ADMIN — MORPHINE SULFATE 4 MG: 4 INJECTION, SOLUTION INTRAMUSCULAR; INTRAVENOUS at 09:30

## 2021-01-01 RX ADMIN — VORICONAZOLE 300 MG: 200 TABLET ORAL at 21:07

## 2021-01-01 RX ADMIN — IPRATROPIUM BROMIDE AND ALBUTEROL SULFATE 3 ML: 2.5; .5 SOLUTION RESPIRATORY (INHALATION) at 19:17

## 2021-01-01 RX ADMIN — CEFTAROLINE FOSAMIL 200 MG: 400 POWDER, FOR SOLUTION INTRAVENOUS at 07:55

## 2021-01-01 RX ADMIN — GUAIFENESIN 200 MG: 100 SOLUTION ORAL at 20:05

## 2021-01-01 RX ADMIN — IPRATROPIUM BROMIDE AND ALBUTEROL SULFATE 3 ML: 2.5; .5 SOLUTION RESPIRATORY (INHALATION) at 14:23

## 2021-01-01 RX ADMIN — PANTOPRAZOLE SODIUM 40 MG: 40 INJECTION, POWDER, FOR SOLUTION INTRAVENOUS at 06:33

## 2021-01-01 RX ADMIN — VANCOMYCIN HYDROCHLORIDE 1000 MG: 1 INJECTION, SOLUTION INTRAVENOUS at 08:24

## 2021-01-01 RX ADMIN — ENOXAPARIN SODIUM 70 MG: 80 INJECTION SUBCUTANEOUS at 15:24

## 2021-01-01 RX ADMIN — INSULIN HUMAN 8 UNITS: 100 INJECTION, SOLUTION PARENTERAL at 17:45

## 2021-01-01 RX ADMIN — IPRATROPIUM BROMIDE AND ALBUTEROL SULFATE 3 ML: 2.5; .5 SOLUTION RESPIRATORY (INHALATION) at 15:35

## 2021-01-01 RX ADMIN — FENTANYL CITRATE 100 MCG/HR: 50 INJECTION, SOLUTION INTRAMUSCULAR; INTRAVENOUS at 11:48

## 2021-01-01 RX ADMIN — GUAIFENESIN 400 MG: 100 SOLUTION ORAL at 16:15

## 2021-01-01 RX ADMIN — LEVOFLOXACIN 500 MG: 500 INJECTION, SOLUTION INTRAVENOUS at 12:15

## 2021-01-01 RX ADMIN — CEFEPIME HYDROCHLORIDE 2 G: 2 INJECTION, POWDER, FOR SOLUTION INTRAVENOUS at 16:38

## 2021-01-01 RX ADMIN — ATORVASTATIN CALCIUM 10 MG: 10 TABLET, FILM COATED ORAL at 21:21

## 2021-01-01 RX ADMIN — INSULIN HUMAN 7 UNITS: 100 INJECTION, SOLUTION PARENTERAL at 18:05

## 2021-01-01 RX ADMIN — INSULIN HUMAN 7 UNITS: 100 INJECTION, SOLUTION PARENTERAL at 05:53

## 2021-01-01 RX ADMIN — HYDROMORPHONE HYDROCHLORIDE 0.5 MG: 1 INJECTION, SOLUTION INTRAMUSCULAR; INTRAVENOUS; SUBCUTANEOUS at 18:24

## 2021-01-01 RX ADMIN — HYDROMORPHONE HYDROCHLORIDE 1 MG: 1 INJECTION, SOLUTION INTRAMUSCULAR; INTRAVENOUS; SUBCUTANEOUS at 07:40

## 2021-01-01 RX ADMIN — DOCUSATE SODIUM 50 MG AND SENNOSIDES 8.6 MG 1 TABLET: 8.6; 5 TABLET, FILM COATED ORAL at 20:19

## 2021-01-01 RX ADMIN — IPRATROPIUM BROMIDE AND ALBUTEROL SULFATE 3 ML: 2.5; .5 SOLUTION RESPIRATORY (INHALATION) at 14:42

## 2021-01-01 RX ADMIN — VORICONAZOLE 450 MG: 10 INJECTION, POWDER, LYOPHILIZED, FOR SOLUTION INTRAVENOUS at 22:08

## 2021-01-01 RX ADMIN — APIXABAN 5 MG: 5 TABLET, FILM COATED ORAL at 08:54

## 2021-01-01 RX ADMIN — APIXABAN 5 MG: 5 TABLET, FILM COATED ORAL at 20:34

## 2021-01-01 RX ADMIN — GADOBENATE DIMEGLUMINE 15 ML: 529 INJECTION, SOLUTION INTRAVENOUS at 10:36

## 2021-01-01 RX ADMIN — PANTOPRAZOLE SODIUM 40 MG: 40 INJECTION, POWDER, FOR SOLUTION INTRAVENOUS at 05:30

## 2021-01-01 RX ADMIN — IPRATROPIUM BROMIDE AND ALBUTEROL SULFATE 3 ML: 2.5; .5 SOLUTION RESPIRATORY (INHALATION) at 14:26

## 2021-01-01 RX ADMIN — LIDOCAINE 1 PATCH: 50 PATCH CUTANEOUS at 12:00

## 2021-01-01 RX ADMIN — CEFTAROLINE FOSAMIL 200 MG: 400 POWDER, FOR SOLUTION INTRAVENOUS at 18:53

## 2021-01-01 RX ADMIN — HYDROMORPHONE HYDROCHLORIDE 0.5 MG: 1 INJECTION, SOLUTION INTRAMUSCULAR; INTRAVENOUS; SUBCUTANEOUS at 00:48

## 2021-01-01 RX ADMIN — GUAIFENESIN 200 MG: 100 SOLUTION ORAL at 08:08

## 2021-01-01 RX ADMIN — IRON SUCROSE 200 MG: 20 INJECTION, SOLUTION INTRAVENOUS at 13:36

## 2021-01-01 RX ADMIN — HEPARIN SODIUM 3170 UNITS: 1000 INJECTION, SOLUTION INTRAVENOUS; SUBCUTANEOUS at 19:37

## 2021-01-01 RX ADMIN — GUAIFENESIN 400 MG: 100 SOLUTION ORAL at 02:10

## 2021-01-01 RX ADMIN — INSULIN DETEMIR 10 UNITS: 100 INJECTION, SOLUTION SUBCUTANEOUS at 20:21

## 2021-01-01 RX ADMIN — INSULIN DETEMIR 10 UNITS: 100 INJECTION, SOLUTION SUBCUTANEOUS at 20:37

## 2021-01-01 RX ADMIN — PANTOPRAZOLE SODIUM 40 MG: 40 INJECTION, POWDER, FOR SOLUTION INTRAVENOUS at 05:42

## 2021-01-01 RX ADMIN — HYDROMORPHONE HYDROCHLORIDE 1 MG: 1 INJECTION, SOLUTION INTRAMUSCULAR; INTRAVENOUS; SUBCUTANEOUS at 13:41

## 2021-01-01 RX ADMIN — VANCOMYCIN HYDROCHLORIDE 1250 MG: 10 INJECTION, POWDER, LYOPHILIZED, FOR SOLUTION INTRAVENOUS at 11:16

## 2021-01-01 RX ADMIN — ACETAMINOPHEN 650 MG: 325 TABLET, FILM COATED ORAL at 19:18

## 2021-01-01 RX ADMIN — OXYCODONE HYDROCHLORIDE 5 MG: 5 SOLUTION ORAL at 21:35

## 2021-01-01 RX ADMIN — IPRATROPIUM BROMIDE AND ALBUTEROL SULFATE 3 ML: 2.5; .5 SOLUTION RESPIRATORY (INHALATION) at 20:13

## 2021-01-01 RX ADMIN — CEFTAROLINE FOSAMIL 200 MG: 400 POWDER, FOR SOLUTION INTRAVENOUS at 06:02

## 2021-01-01 RX ADMIN — IPRATROPIUM BROMIDE AND ALBUTEROL SULFATE 3 ML: 2.5; .5 SOLUTION RESPIRATORY (INHALATION) at 07:05

## 2021-01-01 RX ADMIN — PANTOPRAZOLE SODIUM 40 MG: 40 INJECTION, POWDER, FOR SOLUTION INTRAVENOUS at 05:21

## 2021-01-01 RX ADMIN — SODIUM CHLORIDE 25 ML/HR: 9 INJECTION, SOLUTION INTRAVENOUS at 02:02

## 2021-01-01 RX ADMIN — LIDOCAINE 1 PATCH: 50 PATCH CUTANEOUS at 08:09

## 2021-01-01 RX ADMIN — PROPOFOL 35 MCG/KG/MIN: 10 INJECTION, EMULSION INTRAVENOUS at 20:11

## 2021-01-01 RX ADMIN — GUAIFENESIN 400 MG: 100 SOLUTION ORAL at 15:28

## 2021-01-01 RX ADMIN — CEFTAROLINE FOSAMIL 200 MG: 400 POWDER, FOR SOLUTION INTRAVENOUS at 07:59

## 2021-01-01 RX ADMIN — TAZOBACTAM SODIUM AND PIPERACILLIN SODIUM 4.5 G: 500; 4 INJECTION, SOLUTION INTRAVENOUS at 15:55

## 2021-01-01 RX ADMIN — SODIUM CHLORIDE 1000 ML: 9 INJECTION, SOLUTION INTRAVENOUS at 13:09

## 2021-01-01 RX ADMIN — OXYCODONE HYDROCHLORIDE 5 MG: 5 SOLUTION ORAL at 21:27

## 2021-01-01 RX ADMIN — HEPARIN SODIUM 1630 UNITS/HR: 10000 INJECTION, SOLUTION INTRAVENOUS at 03:51

## 2021-01-01 RX ADMIN — GUAIFENESIN 200 MG: 100 SOLUTION ORAL at 21:46

## 2021-01-01 RX ADMIN — IPRATROPIUM BROMIDE AND ALBUTEROL SULFATE 3 ML: 2.5; .5 SOLUTION RESPIRATORY (INHALATION) at 14:38

## 2021-01-01 RX ADMIN — TAZOBACTAM SODIUM AND PIPERACILLIN SODIUM 4.5 G: 500; 4 INJECTION, SOLUTION INTRAVENOUS at 16:01

## 2021-01-01 RX ADMIN — Medication 0.08 MCG/KG/MIN: at 04:06

## 2021-01-01 RX ADMIN — FENTANYL CITRATE 150 MCG/HR: 50 INJECTION, SOLUTION INTRAMUSCULAR; INTRAVENOUS at 04:23

## 2021-01-01 RX ADMIN — HYDROMORPHONE HYDROCHLORIDE 1 MG: 1 INJECTION, SOLUTION INTRAMUSCULAR; INTRAVENOUS; SUBCUTANEOUS at 00:31

## 2021-01-01 RX ADMIN — ATORVASTATIN CALCIUM 10 MG: 10 TABLET, FILM COATED ORAL at 20:07

## 2021-01-01 RX ADMIN — INSULIN HUMAN 6 UNITS: 100 INJECTION, SOLUTION PARENTERAL at 13:45

## 2021-01-01 RX ADMIN — IPRATROPIUM BROMIDE AND ALBUTEROL SULFATE 3 ML: 2.5; .5 SOLUTION RESPIRATORY (INHALATION) at 10:42

## 2021-01-01 RX ADMIN — IPRATROPIUM BROMIDE AND ALBUTEROL SULFATE 3 ML: 2.5; .5 SOLUTION RESPIRATORY (INHALATION) at 06:33

## 2021-01-01 RX ADMIN — GUAIFENESIN 200 MG: 100 SOLUTION ORAL at 20:27

## 2021-01-01 RX ADMIN — DOCUSATE SODIUM 50 MG AND SENNOSIDES 8.6 MG 1 TABLET: 8.6; 5 TABLET, FILM COATED ORAL at 20:07

## 2021-01-01 RX ADMIN — Medication 0.09 MCG/KG/MIN: at 03:43

## 2021-01-01 RX ADMIN — ENOXAPARIN SODIUM 70 MG: 80 INJECTION SUBCUTANEOUS at 14:03

## 2021-01-01 RX ADMIN — HYDROMORPHONE HYDROCHLORIDE 1 MG: 1 INJECTION, SOLUTION INTRAMUSCULAR; INTRAVENOUS; SUBCUTANEOUS at 23:55

## 2021-01-01 RX ADMIN — HYDROMORPHONE HYDROCHLORIDE 1 MG: 1 INJECTION, SOLUTION INTRAMUSCULAR; INTRAVENOUS; SUBCUTANEOUS at 09:57

## 2021-01-01 RX ADMIN — ALBUMIN HUMAN 25 G: 0.25 SOLUTION INTRAVENOUS at 09:04

## 2021-01-01 RX ADMIN — CEFEPIME HYDROCHLORIDE 2 G: 2 INJECTION, POWDER, FOR SOLUTION INTRAVENOUS at 00:28

## 2021-01-01 RX ADMIN — THIAMINE HYDROCHLORIDE 300 MG: 100 INJECTION, SOLUTION INTRAMUSCULAR; INTRAVENOUS at 08:31

## 2021-01-01 RX ADMIN — IOPAMIDOL 100 ML: 755 INJECTION, SOLUTION INTRAVENOUS at 12:20

## 2021-01-01 RX ADMIN — TAZOBACTAM SODIUM AND PIPERACILLIN SODIUM 4.5 G: 500; 4 INJECTION, SOLUTION INTRAVENOUS at 00:25

## 2021-01-01 RX ADMIN — ENOXAPARIN SODIUM 70 MG: 80 INJECTION SUBCUTANEOUS at 13:48

## 2021-01-01 RX ADMIN — GUAIFENESIN 200 MG: 100 SOLUTION ORAL at 09:10

## 2021-01-01 RX ADMIN — INSULIN HUMAN 4 UNITS: 100 INJECTION, SOLUTION PARENTERAL at 17:23

## 2021-01-01 RX ADMIN — DOCUSATE SODIUM 50 MG AND SENNOSIDES 8.6 MG 1 TABLET: 8.6; 5 TABLET, FILM COATED ORAL at 08:58

## 2021-01-01 RX ADMIN — HYDROMORPHONE HYDROCHLORIDE 1 MG: 1 INJECTION, SOLUTION INTRAMUSCULAR; INTRAVENOUS; SUBCUTANEOUS at 09:17

## 2021-01-01 RX ADMIN — SODIUM CHLORIDE, POTASSIUM CHLORIDE, SODIUM LACTATE AND CALCIUM CHLORIDE 1770 ML: 600; 310; 30; 20 INJECTION, SOLUTION INTRAVENOUS at 10:54

## 2021-01-01 RX ADMIN — LIDOCAINE 1 PATCH: 50 PATCH CUTANEOUS at 08:01

## 2021-01-01 RX ADMIN — DOCUSATE SODIUM 50 MG AND SENNOSIDES 8.6 MG 1 TABLET: 8.6; 5 TABLET, FILM COATED ORAL at 08:03

## 2021-01-01 RX ADMIN — PERFLUTREN 1.17 MG: 6.52 INJECTION, SUSPENSION INTRAVENOUS at 14:49

## 2021-01-01 RX ADMIN — Medication 0.02 MCG/KG/MIN: at 12:09

## 2021-01-01 RX ADMIN — PROPOFOL 40 MCG/KG/MIN: 10 INJECTION, EMULSION INTRAVENOUS at 02:12

## 2021-01-01 RX ADMIN — LEVOFLOXACIN 500 MG: 500 INJECTION, SOLUTION INTRAVENOUS at 12:59

## 2021-01-01 RX ADMIN — HEPARIN SODIUM 1730 UNITS/HR: 10000 INJECTION, SOLUTION INTRAVENOUS at 01:15

## 2021-01-01 RX ADMIN — IPRATROPIUM BROMIDE AND ALBUTEROL SULFATE 3 ML: 2.5; .5 SOLUTION RESPIRATORY (INHALATION) at 14:34

## 2021-01-01 RX ADMIN — DOXYCYCLINE 100 MG: 100 TABLET, FILM COATED ORAL at 09:08

## 2021-01-01 RX ADMIN — APIXABAN 5 MG: 5 TABLET, FILM COATED ORAL at 08:01

## 2021-01-01 RX ADMIN — INSULIN HUMAN 4 UNITS: 100 INJECTION, SOLUTION PARENTERAL at 05:38

## 2021-01-01 RX ADMIN — LORAZEPAM 1 MG: 2 INJECTION INTRAMUSCULAR; INTRAVENOUS at 11:05

## 2021-01-01 RX ADMIN — IPRATROPIUM BROMIDE AND ALBUTEROL SULFATE 3 ML: 2.5; .5 SOLUTION RESPIRATORY (INHALATION) at 10:06

## 2021-01-01 RX ADMIN — VORICONAZOLE 300 MG: 200 TABLET ORAL at 21:45

## 2021-01-01 RX ADMIN — DOCUSATE SODIUM 50 MG AND SENNOSIDES 8.6 MG 1 TABLET: 8.6; 5 TABLET, FILM COATED ORAL at 08:54

## 2021-01-01 RX ADMIN — IPRATROPIUM BROMIDE AND ALBUTEROL SULFATE 3 ML: 2.5; .5 SOLUTION RESPIRATORY (INHALATION) at 18:58

## 2021-01-01 RX ADMIN — IPRATROPIUM BROMIDE AND ALBUTEROL SULFATE 3 ML: 2.5; .5 SOLUTION RESPIRATORY (INHALATION) at 08:31

## 2021-01-01 RX ADMIN — GUAIFENESIN 400 MG: 100 SOLUTION ORAL at 20:38

## 2021-01-01 RX ADMIN — CEFEPIME HYDROCHLORIDE 2 G: 2 INJECTION, POWDER, FOR SOLUTION INTRAVENOUS at 02:00

## 2021-01-01 RX ADMIN — GUAIFENESIN 200 MG: 100 SOLUTION ORAL at 09:11

## 2021-01-01 RX ADMIN — VANCOMYCIN HYDROCHLORIDE 1000 MG: 1 INJECTION, SOLUTION INTRAVENOUS at 20:28

## 2021-01-01 RX ADMIN — PANTOPRAZOLE SODIUM 40 MG: 40 INJECTION, POWDER, FOR SOLUTION INTRAVENOUS at 05:40

## 2021-01-01 RX ADMIN — IPRATROPIUM BROMIDE AND ALBUTEROL SULFATE 3 ML: 2.5; .5 SOLUTION RESPIRATORY (INHALATION) at 10:57

## 2021-01-01 RX ADMIN — HEPARIN SODIUM 3200 UNITS: 1000 INJECTION, SOLUTION INTRAVENOUS; SUBCUTANEOUS at 11:45

## 2021-01-01 RX ADMIN — GUAIFENESIN 400 MG: 100 SOLUTION ORAL at 15:59

## 2021-01-01 RX ADMIN — VANCOMYCIN HYDROCHLORIDE 1000 MG: 1 INJECTION, SOLUTION INTRAVENOUS at 08:07

## 2021-01-01 RX ADMIN — GUAIFENESIN 400 MG: 100 SOLUTION ORAL at 21:21

## 2021-01-01 RX ADMIN — POTASSIUM CHLORIDE 40 MEQ: 20 SOLUTION ORAL at 11:29

## 2021-01-01 RX ADMIN — INSULIN HUMAN 4 UNITS: 100 INJECTION, SOLUTION PARENTERAL at 17:40

## 2021-01-01 RX ADMIN — LIDOCAINE 1 PATCH: 50 PATCH CUTANEOUS at 08:51

## 2021-01-01 RX ADMIN — APIXABAN 5 MG: 5 TABLET, FILM COATED ORAL at 20:07

## 2021-01-01 RX ADMIN — ATORVASTATIN CALCIUM 10 MG: 10 TABLET, FILM COATED ORAL at 20:19

## 2021-01-01 RX ADMIN — LIDOCAINE 1 PATCH: 50 PATCH CUTANEOUS at 12:17

## 2021-01-01 RX ADMIN — IPRATROPIUM BROMIDE AND ALBUTEROL SULFATE 3 ML: 2.5; .5 SOLUTION RESPIRATORY (INHALATION) at 06:38

## 2021-01-01 RX ADMIN — INSULIN DETEMIR 10 UNITS: 100 INJECTION, SOLUTION SUBCUTANEOUS at 09:28

## 2021-01-01 RX ADMIN — IPRATROPIUM BROMIDE AND ALBUTEROL SULFATE 3 ML: 2.5; .5 SOLUTION RESPIRATORY (INHALATION) at 19:13

## 2021-01-01 RX ADMIN — INSULIN HUMAN 6 UNITS: 100 INJECTION, SOLUTION PARENTERAL at 23:19

## 2021-01-01 RX ADMIN — APIXABAN 5 MG: 5 TABLET, FILM COATED ORAL at 08:00

## 2021-01-01 RX ADMIN — TAZOBACTAM SODIUM AND PIPERACILLIN SODIUM 4.5 G: 500; 4 INJECTION, SOLUTION INTRAVENOUS at 08:07

## 2021-01-01 RX ADMIN — LIDOCAINE 1 PATCH: 50 PATCH CUTANEOUS at 08:28

## 2021-01-01 RX ADMIN — IPRATROPIUM BROMIDE AND ALBUTEROL SULFATE 3 ML: 2.5; .5 SOLUTION RESPIRATORY (INHALATION) at 06:34

## 2021-01-01 RX ADMIN — GUAIFENESIN 400 MG: 100 SOLUTION ORAL at 20:15

## 2021-01-01 RX ADMIN — IPRATROPIUM BROMIDE AND ALBUTEROL SULFATE 3 ML: 2.5; .5 SOLUTION RESPIRATORY (INHALATION) at 10:09

## 2021-01-01 RX ADMIN — LORAZEPAM 1 MG: 2 INJECTION INTRAMUSCULAR; INTRAVENOUS at 12:31

## 2021-01-01 RX ADMIN — VORICONAZOLE 450 MG: 10 INJECTION, POWDER, LYOPHILIZED, FOR SOLUTION INTRAVENOUS at 09:17

## 2021-01-01 RX ADMIN — ATORVASTATIN CALCIUM 10 MG: 10 TABLET, FILM COATED ORAL at 20:05

## 2021-01-01 RX ADMIN — INSULIN HUMAN 2 UNITS: 100 INJECTION, SOLUTION PARENTERAL at 06:16

## 2021-01-01 RX ADMIN — IPRATROPIUM BROMIDE AND ALBUTEROL SULFATE 3 ML: 2.5; .5 SOLUTION RESPIRATORY (INHALATION) at 06:36

## 2021-01-01 RX ADMIN — LORAZEPAM 1 MG: 2 INJECTION INTRAMUSCULAR; INTRAVENOUS at 05:44

## 2021-01-01 RX ADMIN — IPRATROPIUM BROMIDE AND ALBUTEROL SULFATE 3 ML: 2.5; .5 SOLUTION RESPIRATORY (INHALATION) at 10:59

## 2021-01-01 RX ADMIN — HEPARIN SODIUM 6340 UNITS: 1000 INJECTION INTRAVENOUS; SUBCUTANEOUS at 07:37

## 2021-01-01 RX ADMIN — GUAIFENESIN 400 MG: 100 SOLUTION ORAL at 03:16

## 2021-01-01 RX ADMIN — CEFTAROLINE FOSAMIL 200 MG: 400 POWDER, FOR SOLUTION INTRAVENOUS at 18:26

## 2021-01-01 RX ADMIN — HEPARIN SODIUM 1630 UNITS/HR: 10000 INJECTION, SOLUTION INTRAVENOUS at 08:24

## 2021-01-01 RX ADMIN — INSULIN HUMAN 2 UNITS: 100 INJECTION, SOLUTION PARENTERAL at 12:53

## 2021-01-01 RX ADMIN — IPRATROPIUM BROMIDE AND ALBUTEROL SULFATE 3 ML: 2.5; .5 SOLUTION RESPIRATORY (INHALATION) at 15:16

## 2021-01-01 RX ADMIN — POLYETHYLENE GLYCOL 3350 17 G: 17 POWDER, FOR SOLUTION ORAL at 08:27

## 2021-01-01 RX ADMIN — DEXTROSE MONOHYDRATE 25 G: 500 INJECTION PARENTERAL at 05:37

## 2021-01-01 RX ADMIN — IPRATROPIUM BROMIDE AND ALBUTEROL SULFATE 3 ML: 2.5; .5 SOLUTION RESPIRATORY (INHALATION) at 14:02

## 2021-01-01 RX ADMIN — IPRATROPIUM BROMIDE AND ALBUTEROL SULFATE 3 ML: 2.5; .5 SOLUTION RESPIRATORY (INHALATION) at 07:00

## 2021-01-01 RX ADMIN — VORICONAZOLE 300 MG: 200 TABLET ORAL at 20:19

## 2021-01-01 RX ADMIN — GUAIFENESIN 400 MG: 100 SOLUTION ORAL at 03:22

## 2021-01-01 RX ADMIN — TAZOBACTAM SODIUM AND PIPERACILLIN SODIUM 4.5 G: 500; 4 INJECTION, SOLUTION INTRAVENOUS at 18:24

## 2021-01-01 RX ADMIN — HYDROMORPHONE HYDROCHLORIDE 1 MG: 1 INJECTION, SOLUTION INTRAMUSCULAR; INTRAVENOUS; SUBCUTANEOUS at 19:20

## 2021-01-01 RX ADMIN — LIDOCAINE 1 PATCH: 50 PATCH CUTANEOUS at 08:58

## 2021-01-01 RX ADMIN — INSULIN HUMAN 2 UNITS: 100 INJECTION, SOLUTION PARENTERAL at 11:46

## 2021-01-01 RX ADMIN — LORAZEPAM 1 MG: 2 INJECTION INTRAMUSCULAR; INTRAVENOUS at 00:29

## 2021-01-01 RX ADMIN — APIXABAN 5 MG: 5 TABLET, FILM COATED ORAL at 20:21

## 2021-01-01 RX ADMIN — PANTOPRAZOLE SODIUM 40 MG: 40 INJECTION, POWDER, FOR SOLUTION INTRAVENOUS at 05:12

## 2021-01-01 RX ADMIN — INSULIN DETEMIR 10 UNITS: 100 INJECTION, SOLUTION SUBCUTANEOUS at 08:59

## 2021-01-01 RX ADMIN — INSULIN HUMAN 4 UNITS: 100 INJECTION, SOLUTION PARENTERAL at 23:59

## 2021-01-01 RX ADMIN — SODIUM CHLORIDE, SODIUM LACTATE, POTASSIUM CHLORIDE, CALCIUM CHLORIDE AND DEXTROSE MONOHYDRATE 100 ML/HR: 5; 600; 310; 30; 20 INJECTION, SOLUTION INTRAVENOUS at 18:03

## 2021-01-01 RX ADMIN — INSULIN HUMAN 4 UNITS: 100 INJECTION, SOLUTION PARENTERAL at 11:45

## 2021-01-01 RX ADMIN — IPRATROPIUM BROMIDE AND ALBUTEROL SULFATE 3 ML: 2.5; .5 SOLUTION RESPIRATORY (INHALATION) at 19:36

## 2021-01-01 RX ADMIN — INSULIN DETEMIR 10 UNITS: 100 INJECTION, SOLUTION SUBCUTANEOUS at 20:15

## 2021-01-01 RX ADMIN — DOCUSATE SODIUM 50 MG AND SENNOSIDES 8.6 MG 1 TABLET: 8.6; 5 TABLET, FILM COATED ORAL at 12:00

## 2021-01-01 RX ADMIN — HYDROMORPHONE HYDROCHLORIDE 0.5 MG: 1 INJECTION, SOLUTION INTRAMUSCULAR; INTRAVENOUS; SUBCUTANEOUS at 05:11

## 2021-01-01 RX ADMIN — VANCOMYCIN HYDROCHLORIDE 1000 MG: 1 INJECTION, SOLUTION INTRAVENOUS at 20:12

## 2021-01-01 RX ADMIN — VORICONAZOLE 300 MG: 200 TABLET ORAL at 21:08

## 2021-01-01 RX ADMIN — GUAIFENESIN 400 MG: 100 SOLUTION ORAL at 15:06

## 2021-01-01 RX ADMIN — IPRATROPIUM BROMIDE AND ALBUTEROL SULFATE 3 ML: 2.5; .5 SOLUTION RESPIRATORY (INHALATION) at 14:33

## 2021-01-01 RX ADMIN — GUAIFENESIN 400 MG: 100 SOLUTION ORAL at 15:58

## 2021-01-01 RX ADMIN — DEXMEDETOMIDINE HYDROCHLORIDE IN 0.9% SODIUM CHLORIDE 0.2 MCG/KG/HR: 4 INJECTION INTRAVENOUS at 12:08

## 2021-01-01 RX ADMIN — INSULIN DETEMIR 10 UNITS: 100 INJECTION, SOLUTION SUBCUTANEOUS at 20:05

## 2021-01-01 RX ADMIN — CEFEPIME HYDROCHLORIDE 2 G: 2 INJECTION, POWDER, FOR SOLUTION INTRAVENOUS at 02:02

## 2021-01-01 RX ADMIN — PANTOPRAZOLE SODIUM 40 MG: 40 INJECTION, POWDER, FOR SOLUTION INTRAVENOUS at 05:27

## 2021-01-01 RX ADMIN — HEPARIN SODIUM 3200 UNITS: 1000 INJECTION, SOLUTION INTRAVENOUS; SUBCUTANEOUS at 12:23

## 2021-01-01 RX ADMIN — LORAZEPAM 2 MG: 2 INJECTION INTRAMUSCULAR; INTRAVENOUS at 08:58

## 2021-01-01 RX ADMIN — Medication 0.01 MCG/KG/MIN: at 18:31

## 2021-01-01 RX ADMIN — HYDROMORPHONE HYDROCHLORIDE 1 MG: 1 INJECTION, SOLUTION INTRAMUSCULAR; INTRAVENOUS; SUBCUTANEOUS at 03:13

## 2021-01-01 RX ADMIN — HYDROMORPHONE HYDROCHLORIDE 1 MG: 1 INJECTION, SOLUTION INTRAMUSCULAR; INTRAVENOUS; SUBCUTANEOUS at 13:27

## 2021-01-01 RX ADMIN — IPRATROPIUM BROMIDE AND ALBUTEROL SULFATE 3 ML: 2.5; .5 SOLUTION RESPIRATORY (INHALATION) at 18:33

## 2021-01-01 RX ADMIN — GUAIFENESIN 400 MG: 100 SOLUTION ORAL at 02:18

## 2021-01-01 RX ADMIN — VORICONAZOLE 300 MG: 10 INJECTION, POWDER, LYOPHILIZED, FOR SOLUTION INTRAVENOUS at 10:29

## 2021-01-01 RX ADMIN — IPRATROPIUM BROMIDE AND ALBUTEROL SULFATE 3 ML: 2.5; .5 SOLUTION RESPIRATORY (INHALATION) at 10:16

## 2021-01-01 RX ADMIN — VANCOMYCIN HYDROCHLORIDE 1000 MG: 1 INJECTION, SOLUTION INTRAVENOUS at 21:35

## 2021-01-01 RX ADMIN — PANTOPRAZOLE SODIUM 40 MG: 40 INJECTION, POWDER, FOR SOLUTION INTRAVENOUS at 05:17

## 2021-01-01 RX ADMIN — PANTOPRAZOLE SODIUM 40 MG: 40 INJECTION, POWDER, FOR SOLUTION INTRAVENOUS at 05:08

## 2021-01-01 RX ADMIN — DEXTROSE AND SODIUM CHLORIDE 100 ML/HR: 5; 900 INJECTION, SOLUTION INTRAVENOUS at 05:55

## 2021-01-01 RX ADMIN — LEVOFLOXACIN 500 MG: 500 INJECTION, SOLUTION INTRAVENOUS at 13:35

## 2021-01-01 RX ADMIN — IPRATROPIUM BROMIDE AND ALBUTEROL SULFATE 3 ML: 2.5; .5 SOLUTION RESPIRATORY (INHALATION) at 14:22

## 2021-01-01 RX ADMIN — FUROSEMIDE 40 MG: 10 INJECTION, SOLUTION INTRAMUSCULAR; INTRAVENOUS at 02:57

## 2021-01-01 RX ADMIN — INSULIN HUMAN 8 UNITS: 100 INJECTION, SOLUTION PARENTERAL at 06:10

## 2021-01-01 RX ADMIN — INSULIN DETEMIR 10 UNITS: 100 INJECTION, SOLUTION SUBCUTANEOUS at 12:49

## 2021-01-01 RX ADMIN — INSULIN HUMAN 4 UNITS: 100 INJECTION, SOLUTION PARENTERAL at 00:03

## 2021-01-01 RX ADMIN — HEPARIN SODIUM 23.11 UNITS/KG/HR: 10000 INJECTION, SOLUTION INTRAVENOUS at 13:42

## 2021-01-01 RX ADMIN — CEFEPIME HYDROCHLORIDE 2 G: 2 INJECTION, POWDER, FOR SOLUTION INTRAVENOUS at 12:24

## 2021-01-01 RX ADMIN — SODIUM CHLORIDE, PRESERVATIVE FREE 10 ML: 5 INJECTION INTRAVENOUS at 08:00

## 2021-01-01 RX ADMIN — IPRATROPIUM BROMIDE AND ALBUTEROL SULFATE 3 ML: 2.5; .5 SOLUTION RESPIRATORY (INHALATION) at 06:58

## 2021-01-01 RX ADMIN — LIDOCAINE 1 PATCH: 50 PATCH CUTANEOUS at 08:03

## 2021-01-01 RX ADMIN — SODIUM CHLORIDE, POTASSIUM CHLORIDE, SODIUM LACTATE AND CALCIUM CHLORIDE 50 ML/HR: 600; 310; 30; 20 INJECTION, SOLUTION INTRAVENOUS at 11:25

## 2021-01-01 RX ADMIN — IPRATROPIUM BROMIDE AND ALBUTEROL SULFATE 3 ML: 2.5; .5 SOLUTION RESPIRATORY (INHALATION) at 11:09

## 2021-01-01 RX ADMIN — HEPARIN SODIUM 3170 UNITS: 1000 INJECTION, SOLUTION INTRAVENOUS; SUBCUTANEOUS at 00:14

## 2021-01-01 RX ADMIN — POLYETHYLENE GLYCOL 3350 17 G: 17 POWDER, FOR SOLUTION ORAL at 08:08

## 2021-01-01 RX ADMIN — INSULIN HUMAN 4 UNITS: 100 INJECTION, SOLUTION PARENTERAL at 06:22

## 2021-01-01 RX ADMIN — POLYETHYLENE GLYCOL 3350 17 G: 17 POWDER, FOR SOLUTION ORAL at 12:00

## 2021-01-01 RX ADMIN — IPRATROPIUM BROMIDE AND ALBUTEROL SULFATE 3 ML: 2.5; .5 SOLUTION RESPIRATORY (INHALATION) at 19:07

## 2021-01-01 RX ADMIN — INSULIN HUMAN 8 UNITS: 100 INJECTION, SOLUTION PARENTERAL at 12:39

## 2021-01-01 RX ADMIN — LORAZEPAM 1 MG: 2 INJECTION INTRAMUSCULAR; INTRAVENOUS at 14:48

## 2021-01-01 RX ADMIN — Medication 0.08 MCG/KG/MIN: at 10:42

## 2021-01-01 RX ADMIN — GUAIFENESIN 400 MG: 100 SOLUTION ORAL at 20:25

## 2021-01-01 RX ADMIN — INSULIN DETEMIR 10 UNITS: 100 INJECTION, SOLUTION SUBCUTANEOUS at 09:00

## 2021-01-01 RX ADMIN — INSULIN HUMAN 6 UNITS: 100 INJECTION, SOLUTION PARENTERAL at 05:12

## 2021-01-01 RX ADMIN — GUAIFENESIN 400 MG: 100 SOLUTION ORAL at 09:00

## 2021-01-01 RX ADMIN — TUBERCULIN PURIFIED PROTEIN DERIVATIVE 5 UNITS: 5 INJECTION, SOLUTION INTRADERMAL at 18:45

## 2021-01-01 RX ADMIN — SODIUM CHLORIDE, PRESERVATIVE FREE 10 ML: 5 INJECTION INTRAVENOUS at 20:14

## 2021-01-01 RX ADMIN — FLUCONAZOLE 100 MG: 200 INJECTION, SOLUTION INTRAVENOUS at 08:55

## 2021-01-01 RX ADMIN — PROPOFOL 5 MCG/KG/MIN: 10 INJECTION, EMULSION INTRAVENOUS at 10:08

## 2021-01-01 RX ADMIN — GUAIFENESIN 400 MG: 100 SOLUTION ORAL at 20:22

## 2021-01-01 RX ADMIN — HYDROMORPHONE HYDROCHLORIDE 0.5 MG: 1 INJECTION, SOLUTION INTRAMUSCULAR; INTRAVENOUS; SUBCUTANEOUS at 08:45

## 2021-01-01 RX ADMIN — GUAIFENESIN 400 MG: 100 SOLUTION ORAL at 03:48

## 2021-01-01 RX ADMIN — HEPARIN SODIUM 6340 UNITS: 1000 INJECTION INTRAVENOUS; SUBCUTANEOUS at 22:50

## 2021-01-01 RX ADMIN — SODIUM CHLORIDE 125 ML/HR: 9 INJECTION, SOLUTION INTRAVENOUS at 23:48

## 2021-01-01 RX ADMIN — IRON SUCROSE 200 MG: 20 INJECTION, SOLUTION INTRAVENOUS at 09:08

## 2021-01-01 RX ADMIN — LIDOCAINE 1 PATCH: 50 PATCH CUTANEOUS at 08:08

## 2021-01-01 RX ADMIN — INSULIN HUMAN 4 UNITS: 100 INJECTION, SOLUTION PARENTERAL at 00:01

## 2021-01-01 RX ADMIN — VORICONAZOLE 300 MG: 200 TABLET ORAL at 08:12

## 2021-01-01 RX ADMIN — ENOXAPARIN SODIUM 70 MG: 80 INJECTION SUBCUTANEOUS at 02:37

## 2021-01-01 RX ADMIN — INSULIN DETEMIR 10 UNITS: 100 INJECTION, SOLUTION SUBCUTANEOUS at 20:22

## 2021-01-01 RX ADMIN — DOCUSATE SODIUM 50 MG AND SENNOSIDES 8.6 MG 1 TABLET: 8.6; 5 TABLET, FILM COATED ORAL at 20:00

## 2021-01-01 RX ADMIN — CEFTAROLINE FOSAMIL 200 MG: 400 POWDER, FOR SOLUTION INTRAVENOUS at 22:22

## 2021-01-01 RX ADMIN — SODIUM CHLORIDE, POTASSIUM CHLORIDE, SODIUM LACTATE AND CALCIUM CHLORIDE 15 ML/HR: 600; 310; 30; 20 INJECTION, SOLUTION INTRAVENOUS at 02:51

## 2021-01-01 RX ADMIN — VANCOMYCIN HYDROCHLORIDE 1000 MG: 1 INJECTION, SOLUTION INTRAVENOUS at 07:31

## 2021-01-01 RX ADMIN — GUAIFENESIN 400 MG: 100 SOLUTION ORAL at 08:00

## 2021-01-01 RX ADMIN — INSULIN HUMAN 6 UNITS: 100 INJECTION, SOLUTION PARENTERAL at 23:54

## 2021-01-01 RX ADMIN — HYDROMORPHONE HYDROCHLORIDE 1 MG: 1 INJECTION, SOLUTION INTRAMUSCULAR; INTRAVENOUS; SUBCUTANEOUS at 11:26

## 2021-01-01 RX ADMIN — IPRATROPIUM BROMIDE AND ALBUTEROL SULFATE 3 ML: 2.5; .5 SOLUTION RESPIRATORY (INHALATION) at 18:51

## 2021-01-01 RX ADMIN — APIXABAN 5 MG: 5 TABLET, FILM COATED ORAL at 17:53

## 2021-01-01 RX ADMIN — VORICONAZOLE 300 MG: 10 INJECTION, POWDER, LYOPHILIZED, FOR SOLUTION INTRAVENOUS at 22:30

## 2021-01-01 RX ADMIN — LACTULOSE 20 G: 20 SOLUTION ORAL at 11:59

## 2021-01-01 RX ADMIN — DOXYCYCLINE 100 MG: 100 TABLET, FILM COATED ORAL at 12:54

## 2021-01-01 RX ADMIN — TAZOBACTAM SODIUM AND PIPERACILLIN SODIUM 4.5 G: 500; 4 INJECTION, SOLUTION INTRAVENOUS at 23:50

## 2021-01-01 RX ADMIN — ENOXAPARIN SODIUM 70 MG: 80 INJECTION SUBCUTANEOUS at 13:31

## 2021-01-01 RX ADMIN — SODIUM CHLORIDE, PRESERVATIVE FREE 10 ML: 5 INJECTION INTRAVENOUS at 08:12

## 2021-01-01 RX ADMIN — GUAIFENESIN 400 MG: 100 SOLUTION ORAL at 02:19

## 2021-01-01 RX ADMIN — EPINEPHRINE 1 MG: 0.1 INJECTION INTRACARDIAC; INTRAVENOUS at 10:20

## 2021-01-01 RX ADMIN — EPINEPHRINE 1 MG: 0.1 INJECTION INTRACARDIAC; INTRAVENOUS at 09:43

## 2021-01-01 RX ADMIN — PROPOFOL 40 MCG/KG/MIN: 10 INJECTION, EMULSION INTRAVENOUS at 09:08

## 2021-01-01 RX ADMIN — POLYETHYLENE GLYCOL 3350 17 G: 17 POWDER, FOR SOLUTION ORAL at 09:10

## 2021-01-01 RX ADMIN — GUAIFENESIN 400 MG: 100 SOLUTION ORAL at 08:54

## 2021-01-01 RX ADMIN — IPRATROPIUM BROMIDE AND ALBUTEROL SULFATE 3 ML: 2.5; .5 SOLUTION RESPIRATORY (INHALATION) at 06:30

## 2021-01-01 RX ADMIN — IPRATROPIUM BROMIDE AND ALBUTEROL SULFATE 3 ML: 2.5; .5 SOLUTION RESPIRATORY (INHALATION) at 10:39

## 2021-01-01 RX ADMIN — GUAIFENESIN 400 MG: 100 SOLUTION ORAL at 15:11

## 2021-01-01 RX ADMIN — ENOXAPARIN SODIUM 70 MG: 80 INJECTION SUBCUTANEOUS at 14:09

## 2021-01-01 RX ADMIN — IPRATROPIUM BROMIDE AND ALBUTEROL SULFATE 3 ML: 2.5; .5 SOLUTION RESPIRATORY (INHALATION) at 19:44

## 2021-01-01 RX ADMIN — CEFEPIME HYDROCHLORIDE 2 G: 2 INJECTION, POWDER, FOR SOLUTION INTRAVENOUS at 10:44

## 2021-01-01 RX ADMIN — HYDROMORPHONE HYDROCHLORIDE 1 MG: 1 INJECTION, SOLUTION INTRAMUSCULAR; INTRAVENOUS; SUBCUTANEOUS at 16:06

## 2021-01-01 RX ADMIN — HYDROMORPHONE HYDROCHLORIDE 1 MG: 1 INJECTION, SOLUTION INTRAMUSCULAR; INTRAVENOUS; SUBCUTANEOUS at 08:03

## 2021-01-01 RX ADMIN — OXYCODONE HYDROCHLORIDE 5 MG: 5 SOLUTION ORAL at 08:28

## 2021-01-01 RX ADMIN — INSULIN HUMAN 2 UNITS: 100 INJECTION, SOLUTION PARENTERAL at 18:01

## 2021-01-01 RX ADMIN — INSULIN HUMAN 2 UNITS: 100 INJECTION, SOLUTION PARENTERAL at 05:17

## 2021-01-01 RX ADMIN — IPRATROPIUM BROMIDE AND ALBUTEROL SULFATE 3 ML: 2.5; .5 SOLUTION RESPIRATORY (INHALATION) at 10:38

## 2021-01-01 RX ADMIN — IPRATROPIUM BROMIDE AND ALBUTEROL SULFATE 3 ML: 2.5; .5 SOLUTION RESPIRATORY (INHALATION) at 14:52

## 2021-01-01 RX ADMIN — HYDROMORPHONE HYDROCHLORIDE 0.5 MG: 1 INJECTION, SOLUTION INTRAMUSCULAR; INTRAVENOUS; SUBCUTANEOUS at 03:19

## 2021-01-01 RX ADMIN — INSULIN HUMAN 2 UNITS: 100 INJECTION, SOLUTION PARENTERAL at 05:16

## 2021-01-01 RX ADMIN — SODIUM CHLORIDE, POTASSIUM CHLORIDE, SODIUM LACTATE AND CALCIUM CHLORIDE 1000 ML: 600; 310; 30; 20 INJECTION, SOLUTION INTRAVENOUS at 08:43

## 2021-01-01 RX ADMIN — PROPOFOL 50 MCG/KG/MIN: 10 INJECTION, EMULSION INTRAVENOUS at 16:16

## 2021-01-01 RX ADMIN — INSULIN DETEMIR 10 UNITS: 100 INJECTION, SOLUTION SUBCUTANEOUS at 08:08

## 2021-01-01 RX ADMIN — Medication 0.1 MCG/KG/MIN: at 13:35

## 2021-01-01 RX ADMIN — LIDOCAINE 1 PATCH: 50 PATCH CUTANEOUS at 08:10

## 2021-01-01 RX ADMIN — HYDROMORPHONE HYDROCHLORIDE 0.5 MG: 1 INJECTION, SOLUTION INTRAMUSCULAR; INTRAVENOUS; SUBCUTANEOUS at 21:20

## 2021-01-01 RX ADMIN — ATORVASTATIN CALCIUM 10 MG: 10 TABLET, FILM COATED ORAL at 21:07

## 2021-01-01 RX ADMIN — GUAIFENESIN 400 MG: 100 SOLUTION ORAL at 08:01

## 2021-01-01 RX ADMIN — GUAIFENESIN 200 MG: 100 SOLUTION ORAL at 08:09

## 2021-01-01 RX ADMIN — INSULIN HUMAN 2 UNITS: 100 INJECTION, SOLUTION PARENTERAL at 00:25

## 2021-01-01 RX ADMIN — PANTOPRAZOLE SODIUM 40 MG: 40 INJECTION, POWDER, FOR SOLUTION INTRAVENOUS at 06:10

## 2021-01-01 RX ADMIN — LORAZEPAM 1 MG: 2 INJECTION INTRAMUSCULAR; INTRAVENOUS at 09:32

## 2021-01-01 RX ADMIN — DOCUSATE SODIUM 50 MG AND SENNOSIDES 8.6 MG 1 TABLET: 8.6; 5 TABLET, FILM COATED ORAL at 22:47

## 2021-01-01 RX ADMIN — GUAIFENESIN 400 MG: 100 SOLUTION ORAL at 15:48

## 2021-01-01 RX ADMIN — IPRATROPIUM BROMIDE AND ALBUTEROL SULFATE 3 ML: 2.5; .5 SOLUTION RESPIRATORY (INHALATION) at 19:15

## 2021-01-01 RX ADMIN — INSULIN HUMAN 4 UNITS: 100 INJECTION, SOLUTION PARENTERAL at 00:07

## 2021-01-01 RX ADMIN — INSULIN HUMAN 2 UNITS: 100 INJECTION, SOLUTION PARENTERAL at 23:50

## 2021-01-01 RX ADMIN — HEPARIN SODIUM 21.84 UNITS/KG/HR: 10000 INJECTION, SOLUTION INTRAVENOUS at 20:44

## 2021-01-01 RX ADMIN — VORICONAZOLE 300 MG: 200 TABLET ORAL at 08:59

## 2021-01-01 RX ADMIN — HYDROMORPHONE HYDROCHLORIDE 1 MG: 1 INJECTION, SOLUTION INTRAMUSCULAR; INTRAVENOUS; SUBCUTANEOUS at 11:48

## 2021-01-01 RX ADMIN — INSULIN HUMAN 4 UNITS: 100 INJECTION, SOLUTION PARENTERAL at 23:23

## 2021-01-01 RX ADMIN — INSULIN HUMAN 6 UNITS: 100 INJECTION, SOLUTION PARENTERAL at 23:13

## 2021-01-01 RX ADMIN — APIXABAN 5 MG: 5 TABLET, FILM COATED ORAL at 20:19

## 2021-01-01 RX ADMIN — VORICONAZOLE 300 MG: 200 TABLET ORAL at 09:10

## 2021-01-01 RX ADMIN — TAZOBACTAM SODIUM AND PIPERACILLIN SODIUM 4.5 G: 500; 4 INJECTION, SOLUTION INTRAVENOUS at 15:25

## 2021-01-01 RX ADMIN — INSULIN DETEMIR 10 UNITS: 100 INJECTION, SOLUTION SUBCUTANEOUS at 09:39

## 2021-01-01 RX ADMIN — VANCOMYCIN HYDROCHLORIDE 1000 MG: 1 INJECTION, SOLUTION INTRAVENOUS at 19:43

## 2021-01-01 RX ADMIN — HYDROMORPHONE HYDROCHLORIDE 0.5 MG: 1 INJECTION, SOLUTION INTRAMUSCULAR; INTRAVENOUS; SUBCUTANEOUS at 15:04

## 2021-01-01 RX ADMIN — MIDAZOLAM HYDROCHLORIDE 4 MG: 1 INJECTION, SOLUTION INTRAMUSCULAR; INTRAVENOUS at 15:39

## 2021-01-01 RX ADMIN — HEPARIN SODIUM 1730 UNITS/HR: 10000 INJECTION, SOLUTION INTRAVENOUS at 11:51

## 2021-01-01 RX ADMIN — INSULIN HUMAN 2 UNITS: 100 INJECTION, SOLUTION PARENTERAL at 12:12

## 2021-01-01 RX ADMIN — Medication 0.03 MCG/KG/MIN: at 14:05

## 2021-01-01 RX ADMIN — PROPOFOL 50 MCG/KG/MIN: 10 INJECTION, EMULSION INTRAVENOUS at 21:51

## 2021-01-01 RX ADMIN — CEFTAROLINE FOSAMIL 200 MG: 400 POWDER, FOR SOLUTION INTRAVENOUS at 17:41

## 2021-01-01 RX ADMIN — LIDOCAINE 1 PATCH: 50 PATCH CUTANEOUS at 08:54

## 2021-01-01 RX ADMIN — INSULIN HUMAN 6 UNITS: 100 INJECTION, SOLUTION PARENTERAL at 23:38

## 2021-01-01 RX ADMIN — IPRATROPIUM BROMIDE AND ALBUTEROL SULFATE 3 ML: 2.5; .5 SOLUTION RESPIRATORY (INHALATION) at 19:30

## 2021-01-01 RX ADMIN — LORAZEPAM 1 MG: 2 INJECTION INTRAMUSCULAR; INTRAVENOUS at 22:07

## 2021-01-01 RX ADMIN — INSULIN HUMAN 2 UNITS: 100 INJECTION, SOLUTION PARENTERAL at 23:36

## 2021-01-01 RX ADMIN — TAZOBACTAM SODIUM AND PIPERACILLIN SODIUM 4.5 G: 500; 4 INJECTION, SOLUTION INTRAVENOUS at 23:19

## 2021-01-01 RX ADMIN — VORICONAZOLE 300 MG: 200 TABLET ORAL at 21:13

## 2021-01-01 RX ADMIN — LORAZEPAM 1 MG: 2 INJECTION INTRAMUSCULAR; INTRAVENOUS at 05:11

## 2021-01-01 RX ADMIN — HYDROMORPHONE HYDROCHLORIDE 1 MG: 1 INJECTION, SOLUTION INTRAMUSCULAR; INTRAVENOUS; SUBCUTANEOUS at 22:21

## 2021-01-01 RX ADMIN — HYDROMORPHONE HYDROCHLORIDE 0.5 MG: 1 INJECTION, SOLUTION INTRAMUSCULAR; INTRAVENOUS; SUBCUTANEOUS at 02:48

## 2021-01-01 RX ADMIN — SODIUM CHLORIDE, POTASSIUM CHLORIDE, SODIUM LACTATE AND CALCIUM CHLORIDE 500 ML: 600; 310; 30; 20 INJECTION, SOLUTION INTRAVENOUS at 18:03

## 2021-01-01 RX ADMIN — APIXABAN 5 MG: 5 TABLET, FILM COATED ORAL at 08:58

## 2021-01-01 RX ADMIN — OXYCODONE HYDROCHLORIDE 5 MG: 5 SOLUTION ORAL at 07:48

## 2021-01-01 RX ADMIN — IPRATROPIUM BROMIDE AND ALBUTEROL SULFATE 3 ML: 2.5; .5 SOLUTION RESPIRATORY (INHALATION) at 10:14

## 2021-01-01 RX ADMIN — INSULIN HUMAN 2 UNITS: 100 INJECTION, SOLUTION PARENTERAL at 05:58

## 2021-01-01 RX ADMIN — HYDROMORPHONE HYDROCHLORIDE 1 MG: 1 INJECTION, SOLUTION INTRAMUSCULAR; INTRAVENOUS; SUBCUTANEOUS at 15:43

## 2021-01-01 RX ADMIN — OXYCODONE HYDROCHLORIDE 5 MG: 5 SOLUTION ORAL at 02:37

## 2021-01-01 RX ADMIN — GUAIFENESIN 400 MG: 100 SOLUTION ORAL at 20:07

## 2021-01-01 RX ADMIN — LEVOFLOXACIN 500 MG: 500 INJECTION, SOLUTION INTRAVENOUS at 15:28

## 2021-01-01 RX ADMIN — DOCUSATE SODIUM 50 MG AND SENNOSIDES 8.6 MG 1 TABLET: 8.6; 5 TABLET, FILM COATED ORAL at 20:10

## 2021-01-01 RX ADMIN — GUAIFENESIN 200 MG: 100 SOLUTION ORAL at 16:43

## 2021-01-01 RX ADMIN — HYDROMORPHONE HYDROCHLORIDE 1 MG: 1 INJECTION, SOLUTION INTRAMUSCULAR; INTRAVENOUS; SUBCUTANEOUS at 02:33

## 2021-01-01 RX ADMIN — INSULIN DETEMIR 10 UNITS: 100 INJECTION, SOLUTION SUBCUTANEOUS at 09:16

## 2021-01-01 RX ADMIN — GUAIFENESIN 200 MG: 100 SOLUTION ORAL at 22:28

## 2021-01-01 RX ADMIN — GUAIFENESIN 400 MG: 100 SOLUTION ORAL at 02:47

## 2021-01-01 RX ADMIN — INSULIN DETEMIR 10 UNITS: 100 INJECTION, SOLUTION SUBCUTANEOUS at 21:30

## 2021-01-01 RX ADMIN — ENOXAPARIN SODIUM 70 MG: 80 INJECTION SUBCUTANEOUS at 02:10

## 2021-01-01 RX ADMIN — IPRATROPIUM BROMIDE AND ALBUTEROL SULFATE 3 ML: 2.5; .5 SOLUTION RESPIRATORY (INHALATION) at 06:45

## 2021-01-01 RX ADMIN — DOCUSATE SODIUM 50 MG AND SENNOSIDES 8.6 MG 1 TABLET: 8.6; 5 TABLET, FILM COATED ORAL at 21:21

## 2021-01-01 RX ADMIN — GUAIFENESIN 400 MG: 100 SOLUTION ORAL at 02:16

## 2021-01-01 RX ADMIN — VANCOMYCIN HYDROCHLORIDE 1000 MG: 1 INJECTION, SOLUTION INTRAVENOUS at 08:43

## 2021-01-01 RX ADMIN — INSULIN HUMAN 2 UNITS: 100 INJECTION, SOLUTION PARENTERAL at 17:35

## 2021-01-01 RX ADMIN — TAZOBACTAM SODIUM AND PIPERACILLIN SODIUM 4.5 G: 500; 4 INJECTION, SOLUTION INTRAVENOUS at 23:36

## 2021-01-01 RX ADMIN — CEFTAROLINE FOSAMIL 200 MG: 400 POWDER, FOR SOLUTION INTRAVENOUS at 18:09

## 2021-01-01 RX ADMIN — Medication 0.02 MCG/KG/MIN: at 06:18

## 2021-01-01 RX ADMIN — DOCUSATE SODIUM 50 MG AND SENNOSIDES 8.6 MG 1 TABLET: 8.6; 5 TABLET, FILM COATED ORAL at 22:28

## 2021-01-01 RX ADMIN — ATORVASTATIN CALCIUM 10 MG: 10 TABLET, FILM COATED ORAL at 20:10

## 2021-01-01 RX ADMIN — OXYCODONE HYDROCHLORIDE 5 MG: 5 SOLUTION ORAL at 19:43

## 2021-01-01 RX ADMIN — ENOXAPARIN SODIUM 70 MG: 80 INJECTION SUBCUTANEOUS at 02:35

## 2021-01-01 RX ADMIN — INSULIN HUMAN 4 UNITS: 100 INJECTION, SOLUTION PARENTERAL at 17:56

## 2021-01-01 RX ADMIN — ATORVASTATIN CALCIUM 10 MG: 10 TABLET, FILM COATED ORAL at 20:21

## 2021-01-01 RX ADMIN — Medication 0.02 MCG/KG/MIN: at 01:14

## 2021-01-01 RX ADMIN — HYDROMORPHONE HYDROCHLORIDE 1 MG: 1 INJECTION, SOLUTION INTRAMUSCULAR; INTRAVENOUS; SUBCUTANEOUS at 00:19

## 2021-01-01 RX ADMIN — CEFTAROLINE FOSAMIL 200 MG: 400 POWDER, FOR SOLUTION INTRAVENOUS at 06:12

## 2021-01-01 RX ADMIN — OXYCODONE HYDROCHLORIDE 5 MG: 5 SOLUTION ORAL at 13:27

## 2021-01-01 RX ADMIN — IPRATROPIUM BROMIDE AND ALBUTEROL SULFATE 3 ML: 2.5; .5 SOLUTION RESPIRATORY (INHALATION) at 15:49

## 2021-01-01 RX ADMIN — Medication 0.03 MCG/KG/MIN: at 05:51

## 2021-01-01 RX ADMIN — IPRATROPIUM BROMIDE AND ALBUTEROL SULFATE 3 ML: 2.5; .5 SOLUTION RESPIRATORY (INHALATION) at 10:17

## 2021-01-01 RX ADMIN — HEPARIN SODIUM 3170 UNITS: 1000 INJECTION, SOLUTION INTRAVENOUS; SUBCUTANEOUS at 06:34

## 2021-01-01 RX ADMIN — HYDROMORPHONE HYDROCHLORIDE 1 MG: 1 INJECTION, SOLUTION INTRAMUSCULAR; INTRAVENOUS; SUBCUTANEOUS at 00:21

## 2021-01-01 RX ADMIN — ATORVASTATIN CALCIUM 10 MG: 10 TABLET, FILM COATED ORAL at 20:38

## 2021-01-01 RX ADMIN — SUCCINYLCHOLINE CHLORIDE 100 MG: 20 INJECTION, SOLUTION INTRAMUSCULAR; INTRAVENOUS at 18:56

## 2021-01-01 RX ADMIN — CEFEPIME HYDROCHLORIDE 2 G: 2 INJECTION, POWDER, FOR SOLUTION INTRAVENOUS at 09:10

## 2021-01-01 RX ADMIN — IPRATROPIUM BROMIDE AND ALBUTEROL SULFATE 3 ML: 2.5; .5 SOLUTION RESPIRATORY (INHALATION) at 20:04

## 2021-01-01 RX ADMIN — IPRATROPIUM BROMIDE AND ALBUTEROL SULFATE 3 ML: 2.5; .5 SOLUTION RESPIRATORY (INHALATION) at 19:12

## 2021-01-01 RX ADMIN — Medication 0.02 MCG/KG/MIN: at 07:56

## 2021-01-01 RX ADMIN — PANTOPRAZOLE SODIUM 40 MG: 40 INJECTION, POWDER, FOR SOLUTION INTRAVENOUS at 05:36

## 2021-01-01 RX ADMIN — SODIUM CHLORIDE, SODIUM LACTATE, POTASSIUM CHLORIDE, CALCIUM CHLORIDE AND DEXTROSE MONOHYDRATE 100 ML/HR: 5; 600; 310; 30; 20 INJECTION, SOLUTION INTRAVENOUS at 23:09

## 2021-01-01 RX ADMIN — INSULIN HUMAN 2 UNITS: 100 INJECTION, SOLUTION PARENTERAL at 17:59

## 2021-01-01 RX ADMIN — AZITHROMYCIN MONOHYDRATE 500 MG: 500 INJECTION, POWDER, LYOPHILIZED, FOR SOLUTION INTRAVENOUS at 11:37

## 2021-01-01 RX ADMIN — THIAMINE HYDROCHLORIDE 300 MG: 100 INJECTION, SOLUTION INTRAMUSCULAR; INTRAVENOUS at 15:33

## 2021-01-01 RX ADMIN — INSULIN HUMAN 4 UNITS: 100 INJECTION, SOLUTION PARENTERAL at 17:43

## 2021-01-01 RX ADMIN — HEPARIN SODIUM 1425 UNITS/HR: 10000 INJECTION, SOLUTION INTRAVENOUS at 10:16

## 2021-01-01 RX ADMIN — EPINEPHRINE 1 MG: 0.1 INJECTION INTRACARDIAC; INTRAVENOUS at 09:40

## 2021-01-01 RX ADMIN — FENTANYL CITRATE 50 MCG/HR: 50 INJECTION, SOLUTION INTRAMUSCULAR; INTRAVENOUS at 10:07

## 2021-01-01 RX ADMIN — INSULIN DETEMIR 10 UNITS: 100 INJECTION, SOLUTION SUBCUTANEOUS at 09:12

## 2021-01-01 RX ADMIN — INSULIN HUMAN 4 UNITS: 100 INJECTION, SOLUTION PARENTERAL at 18:34

## 2021-01-01 RX ADMIN — GUAIFENESIN 400 MG: 100 SOLUTION ORAL at 08:58

## 2021-01-01 RX ADMIN — CEFTAROLINE FOSAMIL 200 MG: 400 POWDER, FOR SOLUTION INTRAVENOUS at 06:14

## 2021-01-01 RX ADMIN — HYDROMORPHONE HYDROCHLORIDE 1 MG: 1 INJECTION, SOLUTION INTRAMUSCULAR; INTRAVENOUS; SUBCUTANEOUS at 20:36

## 2021-01-01 RX ADMIN — INSULIN HUMAN 7 UNITS: 100 INJECTION, SOLUTION PARENTERAL at 12:31

## 2021-01-01 RX ADMIN — HYDROMORPHONE HYDROCHLORIDE 1 MG: 1 INJECTION, SOLUTION INTRAMUSCULAR; INTRAVENOUS; SUBCUTANEOUS at 15:20

## 2021-01-01 RX ADMIN — HYDROMORPHONE HYDROCHLORIDE 1 MG: 1 INJECTION, SOLUTION INTRAMUSCULAR; INTRAVENOUS; SUBCUTANEOUS at 00:13

## 2021-01-01 RX ADMIN — PANTOPRAZOLE SODIUM 40 MG: 40 INJECTION, POWDER, FOR SOLUTION INTRAVENOUS at 05:16

## 2021-01-01 RX ADMIN — ENOXAPARIN SODIUM 70 MG: 80 INJECTION SUBCUTANEOUS at 02:28

## 2021-01-01 RX ADMIN — DOCUSATE SODIUM 50 MG AND SENNOSIDES 8.6 MG 1 TABLET: 8.6; 5 TABLET, FILM COATED ORAL at 21:07

## 2021-01-01 RX ADMIN — INSULIN HUMAN 2 UNITS: 100 INJECTION, SOLUTION PARENTERAL at 12:11

## 2021-01-01 RX ADMIN — ACETAMINOPHEN 650 MG: 325 TABLET, FILM COATED ORAL at 02:31

## 2021-01-01 RX ADMIN — IPRATROPIUM BROMIDE AND ALBUTEROL SULFATE 3 ML: 2.5; .5 SOLUTION RESPIRATORY (INHALATION) at 11:41

## 2021-01-01 RX ADMIN — DOCUSATE SODIUM 50 MG AND SENNOSIDES 8.6 MG 1 TABLET: 8.6; 5 TABLET, FILM COATED ORAL at 20:38

## 2021-01-01 RX ADMIN — GUAIFENESIN 200 MG: 100 SOLUTION ORAL at 08:51

## 2021-01-01 RX ADMIN — IPRATROPIUM BROMIDE AND ALBUTEROL SULFATE 3 ML: 2.5; .5 SOLUTION RESPIRATORY (INHALATION) at 10:36

## 2021-01-01 RX ADMIN — GUAIFENESIN 200 MG: 100 SOLUTION ORAL at 16:15

## 2021-01-01 RX ADMIN — INSULIN HUMAN 6 UNITS: 100 INJECTION, SOLUTION PARENTERAL at 18:24

## 2021-01-01 RX ADMIN — OXYCODONE HYDROCHLORIDE 5 MG: 5 SOLUTION ORAL at 02:35

## 2021-01-01 RX ADMIN — GUAIFENESIN 200 MG: 100 SOLUTION ORAL at 16:38

## 2021-07-16 PROBLEM — J96.01 ACUTE RESPIRATORY FAILURE WITH HYPOXIA (HCC): Status: ACTIVE | Noted: 2021-01-01

## 2021-07-16 PROBLEM — S22.39XA CLOSED FRACTURE OF RIB: Status: ACTIVE | Noted: 2021-01-01

## 2021-07-16 PROBLEM — R82.81 PYURIA, STERILE: Status: ACTIVE | Noted: 2021-01-01

## 2021-07-16 PROBLEM — M62.82 NON-TRAUMATIC RHABDOMYOLYSIS: Status: ACTIVE | Noted: 2021-01-01

## 2021-07-16 PROBLEM — A41.9 SHOCK, SEPTIC (HCC): Status: ACTIVE | Noted: 2021-01-01

## 2021-07-16 PROBLEM — I26.99 RIGHT PULMONARY EMBOLUS (HCC): Status: ACTIVE | Noted: 2021-01-01

## 2021-07-16 PROBLEM — J69.0 ASPIRATION PNEUMONIA OF LEFT LOWER LOBE DUE TO VOMIT (HCC): Status: ACTIVE | Noted: 2021-01-01

## 2021-07-16 PROBLEM — I46.9 CARDIAC ARREST (HCC): Status: ACTIVE | Noted: 2021-01-01

## 2021-07-16 PROBLEM — E87.20 LACTIC ACIDOSIS: Status: ACTIVE | Noted: 2021-01-01

## 2021-07-16 PROBLEM — R65.21 SHOCK, SEPTIC (HCC): Status: ACTIVE | Noted: 2021-01-01

## 2021-07-19 PROBLEM — B96.89 UTI DUE TO KLEBSIELLA SPECIES: Status: ACTIVE | Noted: 2021-01-01

## 2021-07-19 PROBLEM — N39.0 UTI DUE TO KLEBSIELLA SPECIES: Status: ACTIVE | Noted: 2021-01-01

## 2021-07-20 PROBLEM — E44.0 MODERATE MALNUTRITION (HCC): Status: ACTIVE | Noted: 2021-01-01

## 2021-07-22 PROBLEM — N17.9 ACUTE KIDNEY INJURY (HCC): Status: ACTIVE | Noted: 2021-01-01

## 2021-07-22 PROBLEM — I63.9 ACUTE CVA (CEREBROVASCULAR ACCIDENT) (HCC): Status: ACTIVE | Noted: 2021-01-01

## 2021-08-01 NOTE — PROGRESS NOTES
[facility] Medicine Services  INPATIENT PROGRESS NOTE    Patient Name: Ford High  Date of Admission: 7/16/2021  Today's Date: 08/01/21  Length of Stay: 16  Primary Care Physician: Denys Yanez Jr., MD    Subjective   Chief Complaint: follow-up cardiac arrest   Cardiac Arrest         Patient was seen and examined at bedside.    TMax 100.3F    Review of Systems   Unable to perform ROS: Intubated        All pertinent negatives and positives are as above. All other systems have been reviewed and are negative unless otherwise stated.     Objective    Temp:  [98 °F (36.7 °C)-100.3 °F (37.9 °C)] 98.4 °F (36.9 °C)  Heart Rate:  [83-96] 92  Resp:  [22-35] 24  BP: ()/(49-74) 110/64  FiO2 (%):  [40 %] 40 %  Physical Exam  Vitals and nursing note reviewed.   Constitutional:       Appearance: He is ill-appearing.   HENT:      Head: Normocephalic and atraumatic.      Nose: No congestion or rhinorrhea.      Mouth/Throat:      Mouth: Mucous membranes are dry.      Pharynx: Oropharynx is clear.   Eyes:      General: No scleral icterus.     Conjunctiva/sclera: Conjunctivae normal.   Cardiovascular:      Rate and Rhythm: Normal rate and regular rhythm.      Heart sounds: No murmur heard.     Pulmonary:      Effort: Pulmonary effort is normal. No respiratory distress.      Breath sounds: No stridor.      Comments: Copious tan/bloody secretions; mechanically ventilated  Abdominal:      General: Abdomen is flat. Bowel sounds are normal. There is no distension.      Palpations: Abdomen is soft. There is no mass.      Tenderness: There is no abdominal tenderness.      Hernia: No hernia is present.   Skin:     General: Skin is warm and dry.      Coloration: Skin is not jaundiced or pale.   Neurological:      Comments: No purposeful movements; no withdrawal to pain consistently   Psychiatric:      Comments: Intubated, no sedation             Results Review:  I have reviewed the labs,  radiology results, and diagnostic studies.    Laboratory Data:   Results from last 7 days   Lab Units 08/01/21 0444 07/31/21 0727 07/30/21 0307   WBC 10*3/mm3 18.60* 13.75* 11.05*   HEMOGLOBIN g/dL 8.4* 7.7* 8.0*   HEMATOCRIT % 26.1* 23.5* 23.7*   PLATELETS 10*3/mm3 255 158 96*        Results from last 7 days   Lab Units 08/01/21 0444 07/31/21  0727 07/30/21  0306 07/29/21  1215 07/29/21  1215 07/28/21  0355 07/28/21  0355 07/27/21  0357 07/27/21  0357   SODIUM mmol/L 131* 134* 132*   < > 133*   < > 133*   < > 133*   POTASSIUM mmol/L 4.7 4.6 5.3*   < > 4.7   < > 4.7   < > 4.2   CHLORIDE mmol/L 95* 98 99   < > 99   < > 101   < > 101   CO2 mmol/L 23.0 28.0 24.0   < > 23.0   < > 19.0*   < > 20.0*   BUN mg/dL 71* 57* 71*   < > 58*   < > 52*   < > 34*   CREATININE mg/dL 4.34* 3.57* 4.34*   < > 4.00*   < > 4.19*   < > 3.43*   CALCIUM mg/dL 8.4* 8.2* 7.9*   < > 8.5*   < > 8.8   < > 8.3*   BILIRUBIN mg/dL  --   --   --   --  0.4  --  0.3  --  0.3   ALK PHOS U/L  --   --   --   --  639*  --  555*  --  350*   ALT (SGPT) U/L  --   --   --   --  12  --  14  --  12   AST (SGOT) U/L  --   --   --   --  17  --  20  --  12   GLUCOSE mg/dL 162* 180* 211*   < > 287*   < > 346*   < > 328*    < > = values in this interval not displayed.       Culture Data:   Blood Culture   Date Value Ref Range Status   07/22/2021 No growth at 3 days  Preliminary   07/22/2021 No growth at 3 days  Preliminary       Radiology Data:   Imaging Results (Last 24 Hours)     Procedure Component Value Units Date/Time    XR Chest 1 View [[id number]] Collected: 08/01/21 0916     Updated: 08/01/21 0920    Narrative:      Frontal supine radiograph of the chest 8/1/2021 4:09 AM CDT     History: intubated; I46.9-Cardiac arrest, cause unspecified     Comparison: Exam dated 7/31/2021      Findings:      Lines and tubes are stable in position. Bilateral, essentially diffuse  patchy pulmonary infiltrates are unchanged. Overall lung volumes are  stable. No  pneumothorax. The cardiomediastinal silhouette and pulmonary  vascularity are unchanged.       No acute osseous or soft tissue abnormality is noted.        Impression:      Impression:   1. No significant interval change.  2. Lung volumes are stable. ET tube is in good position.  3. Bilateral, essentially diffuse pulmonary infiltrates are unchanged.        This report was finalized on 08/01/2021 09:17 by Dr Onofre Stahl, .          I have reviewed the patient's current medications.     Assessment/Plan     Active Hospital Problems    Diagnosis    • **Cardiac arrest (CMS/[facility])    • Acute kidney injury (CMS/HCC)    • Acute CVA (cerebrovascular accident) (CMS/HCC)    • Moderate malnutrition (CMS/[facility])    • UTI due to Klebsiella species    • Aspiration pneumonia of left lower lobe due to vomit (MRSA, Klebsiella, and Aspergillus on culture)    • Acute respiratory failure with hypoxia (CMS/[facility])    • Right middle and lower pulmonary embolus (CMS/[facility])    • Closed fracture of rib due to CPR    • Shock, septic (CMS/[facility])    • Lactic acidosis    • Non-traumatic rhabdomyolysis    • Type 2 diabetes mellitus with hyperglycemia, with long-term current use of insulin (CMS/[facility])    • Leukocytosis    • Normocytic anemia        Plan:  The patient was admitted on 7/16 by Dr. Pickard.  He presented to the emergency department as a cardiac arrest.  Review of the admitting history and physical shows that the patient was at his home by his friend, slumped over in the chair and cyanotic.  He apparently had mustard and a sandwich sitting in his lap.  EMS was dispatched.  He lost a pulse in route.  Return of spontaneous circulation was obtained in the field.  At the time of admission, it was felt that he was suffering from aspiration and sepsis.  He was also found to have acute pulmonary embolism with heavy clot burden of the right middle and lower lobes.  He was admitted to the intensive care unit on ventilatory support and in critical  condition.     He has been followed by pulmonary.  They have been assisting in management of his ventilatory support.  Currently on 40% FiO2 and 6 of PEEP.     He has had infectious issues.  He was felt to have aspiration pneumonia at presentation and has been in septic shock with lactic acidosis.  He was initially on vancomycin and Zosyn and there were some concerns for renal dysfunction being precipitated by this.  ID has transitioned him over to ceftaroline which is covering MRSA in the sputum on 7/16 and the Klebsiella, this course has been completed..  Also noted to have Aspergillus species grow on the 7/16 sputum so he was also receiving voriconazole, this course has been completed.  Appreciate ID input.  Recultured on 7/30-7/31 - these are pending.  Currently on levofloxacin will continue this for now. Renally dose.     UA showed TNTC WBCs and 4+ bacteria    AFB on culture, sent to [facility] for identification.    Repeat cultures NGTD.  Sputum gram stain shows gram positive cocci.  BC NGTD.      Previously anticoagulated with a heparin drip; previously on Lovenox before his NATALY, will transition to eliquis.      Echocardiogram with bubble study done recently had negative saline test results with EF of 70%.     He has had progressive worsening of renal function recently.  He been followed by nephrology since 7/22.  Vascular surgery was consulted on 7/23 and placed a right femoral VasCath for dialysis, this was replaced on 7/26 with a right IJ vascath.  Dialysis per nephrology.      He was having difficulty with awakening from sedation and had poor neurologic examinations.  Dr. Pickard consulted neurology on 7/18.  CT scan of the head without contrast on 7/18 failed to show an acute intracranial abnormality.  This was followed up by an MRI of the brain with and without contrast on 7/19 that showed no acute intracranial abnormalities.  This study was repeated on 7/21 without contrast and at that point showed an acute  infarct of the left temporal lobe medially.  No hemorrhagic conversion.  He is receiving atorvastatin.     Patient has required intermittent transfusions.  No signs of blood loss.       Resume levophed today as MAP < 65 mmHg.  Titrate as needed to maintain MAP > 65 mmHG    Recent hemoglobin A1c 15.5.  Continue Accu-Cheks and sliding scale insulin. Recent reduction of Levemir to 10 units twice daily by Dr. Bassett.      Tube feedings as tolerated.  Monitor closely with increasing pressor needs.     Protonix for peptic ulcer prophylaxis.     Appreciate palliative care assistance in the care of this patient and their family.      PRN Oxycodone, PRN Ativan, PRN Dilaudid     Robinul 0.2 mg IV x 1      Discharge Planning: I expect the patient to be discharged to  in >2 days.  Prognosis is extremely poor.      Electronically signed by Jasbir Pickard MD, 08/01/21, 09:38 CDT.

## 2021-08-01 NOTE — PROGRESS NOTES
PULMONARY AND CRITICAL CARE PROGRESS NOTE - [facility]    Patient: Ford High    1965    MR# [id number]    Acct# [id number]  08/01/21   11:29 CDT  Referring Provider: Jasbir Pickard MD    Chief Complaint: Mechanically ventilated    Interval history: Neurological status unchanged.  He remains on Levophed for blood pressure support.    He has SCDs in place.  He is receiving nutrition via tube feeds.  Lots of secretions reported.  No cough or gag reflex reported.  Meds:  apixaban, 5 mg, Oral, Q12H  atorvastatin, 10 mg, Oral, Nightly  guaiFENesin, 400 mg, Nasogastric, Q6H  insulin detemir, 10 Units, Subcutaneous, Q12H  insulin regular, 0-9 Units, Subcutaneous, Q6H  ipratropium-albuterol, 3 mL, Nebulization, 4x Daily - RT  levoFLOXacin, 500 mg, Intravenous, Q48H  lidocaine, 1 patch, Transdermal, Q24H  pantoprazole, 40 mg, Intravenous, Q AM  senna-docusate sodium, 1 tablet, Oral, BID      dexmedetomidine, 0.2-1.5 mcg/kg/hr, Last Rate: Stopped (07/23/21 0912)  norepinephrine, 0.02-0.3 mcg/kg/min, Last Rate: 0.02 mcg/kg/min (07/31/21 1209)  sodium chloride, 25 mL/hr, Last Rate: 25 mL/hr (07/26/21 0602)      Review of Systems:     Cannot obtain due to mechanical ventilated state     Ventilator Settings:        Resp Rate (Set): 24  Pressure Support (cm H2O): 10 cm H20  FiO2 (%): 40 %  PEEP/CPAP (cm H2O): 6 cm H20  Minute Ventilation (L/min) (Obs): 20.9 L/min  Resp Rate (Observed) Vent: 33  I:E Ratio (Set): 1:2.1  I:E Ratio (Obs): 1:2.80  PIP Observed (cm H2O): 36 cm H2O     Physical Exam:  Temp:  [98 °F (36.7 °C)-100.3 °F (37.9 °C)] 98.4 °F (36.9 °C)  Heart Rate:  [84-96] 88  Resp:  [22-35] 24  BP: ()/(49-74) 113/61  FiO2 (%):  [40 %] 40 %    Intake/Output Summary (Last 24 hours) at 8/1/2021 1129  Last data filed at 8/1/2021 0800  Gross per 24 hour   Intake 2159.33 ml   Output 1650 ml   Net 509.33 ml     SpO2 Percentage    08/01/21 1045 08/01/21 1059 08/01/21 1100   SpO2: 96% 97% 95%       Physical Exam  Constitutional:       Appearance: He is cachectic. He is ill-appearing. He is not toxic-appearing.      Interventions: He is intubated.   HENT:      Mouth/Throat:      Comments: OG intact  Neck:      Thyroid: No thyromegaly.      Vascular: No JVD.   Cardiovascular:      Rate and Rhythm: Normal rate and regular rhythm.      Comments: Rate 88  Pulmonary:      Effort: He is intubated.   Musculoskeletal:      Right lower leg: Edema present.      Left lower leg: Edema present.   Skin:     Coloration: Skin is pale.   Neurological:      Mental Status: He is unresponsive.   Psychiatric:         Behavior: Behavior is not agitated.       Results from last 7 days   Lab Units 08/01/21  0444 07/31/21  0727 07/30/21  0307   WBC 10*3/mm3 18.60* 13.75* 11.05*   HEMOGLOBIN g/dL 8.4* 7.7* 8.0*   PLATELETS 10*3/mm3 255 158 96*     Results from last 7 days   Lab Units 08/01/21  0444 07/31/21  0727 07/30/21  0306   SODIUM mmol/L 131* 134* 132*   POTASSIUM mmol/L 4.7 4.6 5.3*   BUN mg/dL 71* 57* 71*   CREATININE mg/dL 4.34* 3.57* 4.34*     Results from last 7 days   Lab Units 08/01/21  0510 07/31/21  0435 07/30/21  0424   PH, ARTERIAL pH units 7.437 7.483* 7.411   PCO2, ARTERIAL mm Hg 37.8 38.6 39.3   PO2 ART mm Hg 66.0* 73.2* 61.3*   FIO2 % 40 40 35     Blood Culture   Date Value Ref Range Status   07/16/2021 No growth at 24 hours  Preliminary     Culture results from last 30 days   Lab 07/16/21  1701   AFBCX Acid Fast Bacilli isolated*   FUNGUSCX Light growth (2+) Candida albicans*      Recent films:  XR Chest 1 View    Result Date: 8/1/2021  Impression: 1. No significant interval change. 2. Lung volumes are stable. ET tube is in good position. 3. Bilateral, essentially diffuse pulmonary infiltrates are unchanged.   This report was finalized on 08/01/2021 09:17 by Dr Onofre Stahl, .    XR Chest 1 View    Result Date: 7/31/2021  Impression: 1. No significant interval change since previous exam.   This report was  finalized on 07/31/2021 09:32 by Dr. Renard Cuevas MD.    Films reviewed personally by me.  My interpretation: ETT high. Persistent interstitial lung infiltrates, worse on the right, LLL atelectasis. 7-30-21     Pulmonary Assessment:    1. Acute respiratory failure with hypoxia, multifactorial  2. Mechanical ventilation dependence   3. Status post aspiration episode  4. Acute renal failure, requiring dialysis   5. Right-sided pulmonary embolism  6. Status post cardiac arrest likely due to respiratory issue, with anoxic encephalopathy  7. Anemia requiring intermittent prbc transfusions   8. Septic shock, improved, now hypotensive possibly related to fluid depletion    Recommend:     · ETT D#18.  Continue mechanical ventilation. Current settings: AC rate 24, tv 600, peep 6, fio2 0.4.  His mental status with concerns of anoxia limits trial of spontaneous breathing trial and further weaning from the vent.  Also has lots of secretions.  · Not ready for ventilator liberation efforts due to poor mental status  · Await AFB result from state, quantiferon indeterminate   · Chest x-ray and ABG daily while on the ventilator  · DVT prophylaxis - Eliquis  · Stress ulcer prophylaxis, Protonix   · Antibiotic: teflaro complete, levofloxacin D#5 and voriconazole complete, ID managing appreciate their assistance  · Continue ipratropium and albuterol    · Continue guaifenesin   · Continue to monitor h&h, transfuse as appropriate   · Continue enteral nutrition   · Prognosis seems poor with multiorgan failure. Possibly comfort care once guardianship established  · Significant concern for anoxia.    Electronically signed by Patricia Willson MD on 8/1/2021 at 11:29 CDT

## 2021-08-01 NOTE — PROGRESS NOTES
INFECTIOUS DISEASES PROGRESS NOTE    Patient:  Ford High  YOB: 1965  MRN: [id number]   Admit date: 7/16/2021   Admitting Physician: Jasbir Pickard MD  Primary Care Physician: Denys Yanez Jr., MD    Chief Complaint: Unobtainable from patient on ventilator        Interval History: Reviewed with DARRION Langley.  Family has still not been to see the patient.  Korin was in contact with the patient's sister who still is unclear about when she may come in to see the patient.      Bronch specimen positive for AFB here and sent to [facility] has been finalized by [facility] lab as negative is they were not able to find any AFB on the specimen.    PPD was negative-there was not any steroids given per review with DARRION Langley prior to this placement at least since he has been hospitalized  T spot was negative  QuantiFERON gold was indeterminate due to poor mitogen control.  Airborne isolation discontinued July 30          Allergies: No Known Allergies    Current Meds:     Current Facility-Administered Medications:   •  acetaminophen (TYLENOL) tablet 650 mg, 650 mg, Oral, Q4H PRN, 650 mg at 07/30/21 1918 **OR** acetaminophen (TYLENOL) suppository 650 mg, 650 mg, Rectal, Q4H PRN, Jasbir Pickard MD  •  apixaban (ELIQUIS) tablet 5 mg, 5 mg, Oral, Q12H, Jasbir Pickard MD, 5 mg at 08/01/21 0800  •  atorvastatin (LIPITOR) tablet 10 mg, 10 mg, Oral, Nightly, Christ Bassett MD, 10 mg at 07/31/21 2007  •  dexmedetomidine (PRECEDEX) 400 mcg in 100 mL NS infusion, 0.2-1.5 mcg/kg/hr, Intravenous, Titrated, Christ Bassett MD, Held at 07/23/21 0912  •  dextrose (D50W) 25 g/ 50mL Intravenous Solution 25 g, 25 g, Intravenous, Q15 Min PRN, Jasbir Pickard MD, 25 g at 07/17/21 0537  •  dextrose (GLUTOSE) oral gel 15 g, 15 g, Oral, Q15 Min PRN, Jasbir Pickard MD  •  glucagon (human recombinant) (GLUCAGEN DIAGNOSTIC) injection 1 mg, 1 mg, Subcutaneous, Q15 Min PRN, Jasbir Pickard MD  •   guaiFENesin (ROBITUSSIN) 100 MG/5ML oral solution 400 mg, 400 mg, Nasogastric, Q6H, Jasbir Pickard MD, 400 mg at 08/01/21 0800  •  heparin (porcine) injection 3,200 Units, 3,200 Units, Intracatheter, PRN, Chele Kasper MD, 3,200 Units at 07/30/21 1223  •  HYDROmorphone (DILAUDID) injection 0.5 mg, 0.5 mg, Intravenous, Q2H PRN, Jasbir Pickard MD, 0.5 mg at 07/30/21 0248  •  insulin detemir (LEVEMIR) injection 10 Units, 10 Units, Subcutaneous, Q12H, Christ Bassett MD, 10 Units at 08/01/21 0800  •  insulin regular (humuLIN R,novoLIN R) injection 0-9 Units, 0-9 Units, Subcutaneous, Q6H, Jasbir Pickard MD, 2 Units at 08/01/21 1211  •  ipratropium-albuterol (DUO-NEB) nebulizer solution 3 mL, 3 mL, Nebulization, 4x Daily - RT, Jasbir Pickard MD, 3 mL at 08/01/21 1059  •  levoFLOXacin (LEVAQUIN) 500 mg/100 mL D5W (premix) (LEVAQUIN) 500 mg, 500 mg, Intravenous, Q48H, Jasbir Pickard MD, 500 mg at 08/01/21 1215  •  lidocaine (LIDODERM) 5 % 1 patch, 1 patch, Transdermal, Q24H, Jasbir Pickard MD, 1 patch at 08/01/21 0801  •  LORazepam (ATIVAN) injection 1 mg, 1 mg, Intravenous, Q6H PRN, Jasbir Pickard MD, 1 mg at 07/29/21 1448  •  norepinephrine (LEVOPHED) 8 mg in 250 mL NS infusion (premix), 0.02-0.3 mcg/kg/min, Intravenous, Titrated, Christ Bassett MD, Last Rate: 3.15 mL/hr at 07/31/21 1209, 0.02 mcg/kg/min at 07/31/21 1209  •  ondansetron (ZOFRAN) tablet 4 mg, 4 mg, Oral, Q6H PRN **OR** ondansetron (ZOFRAN) injection 4 mg, 4 mg, Intravenous, Q6H PRN, Jasbir Pickard MD  •  oxyCODONE (ROXICODONE) 5 MG/5ML solution 5 mg, 5 mg, Oral, Q6H PRN, Christ Bassett MD, 5 mg at 08/01/21 0748  •  pantoprazole (PROTONIX) injection 40 mg, 40 mg, Intravenous, Q AM, Jasbir Pickard MD, 40 mg at 08/01/21 0540  •  sennosides-docusate (PERICOLACE) 8.6-50 MG per tablet 1 tablet, 1 tablet, Oral, BID, Jasbir Pickard MD, 1 tablet at 07/31/21 2007  •  sodium chloride  "0.9 % bolus 100 mL, 100 mL, Intravenous, PRN, Chele Kasper MD  •  sodium chloride 0.9 % flush 10 mL, 10 mL, Intravenous, PRN, Jasbir Pickard MD, 10 mL at 21 0800  •  sodium chloride 0.9 % infusion, 25 mL/hr, Intravenous, Continuous, Brown Whitfield MD, Last Rate: 25 mL/hr at 21 0602, 25 mL/hr at 21 0602      Review of Systems   Unable to perform ROS: Intubated       Objective     Vital Signs:  Temp (24hrs), Av °F (37.2 °C), Min:98 °F (36.7 °C), Max:100.3 °F (37.9 °C)      /62   Pulse 88   Temp 98.6 °F (37 °C) (Axillary)   Resp 24   Ht 190.5 cm (75\")   Wt 84.2 kg (185 lb 9.6 oz)   SpO2 96%   BMI 23.20 kg/m²         Physical Exam     General: Patient is chronically and acutely ill-appearing lying in bed    HEENT: ET tube in place and connected to ventilator.  OG tube in place.  Respiratory: Effort even and unlabored.    Neuro:-Still spontaneously moving left upper extremity only.  Extremities: Fairly symmetric edema upper and lower extremities  Psych:  not agitated    Results Review:    I reviewed the patient's new clinical results.    Lab Results:      ABG with pH 7.48/PCO2 38.6/PO2 73.2    CBC:   Lab Results   Lab 21  0556 21  0357 21  0355 21  1215 21  0307 21  0727 21  0444   WBC 10.03 7.62 9.27 10.40 11.05* 13.75* 18.60*   HEMOGLOBIN 7.1* 6.2* 8.3* 8.2* 8.0* 7.7* 8.4*   HEMATOCRIT 21.4* 18.9* 24.1* 24.0* 23.7* 23.5* 26.1*   PLATELETS 161 114* 99* 93* 96* 158 255         CMP:   Lab Results   Lab 21  0357 21  0357 21  0355 21  0355 21  1215 21  1215 21  0306 21  0727 21  0444   SODIUM 133*   < > 133*   < > 133*   < > 132* 134* 131*   POTASSIUM 4.2   < > 4.7   < > 4.7   < > 5.3* 4.6 4.7   CHLORIDE 101   < > 101   < > 99   < > 99 98 95*   CO2 20.0*   < > 19.0*   < > 23.0   < > 24.0 28.0 23.0   BUN 34*   < > 52*   < > 58*   < > 71* 57* 71*   CREATININE 3.43*   < " > 4.19*   < > 4.00*   < > 4.34* 3.57* 4.34*   CALCIUM 8.3*   < > 8.8   < > 8.5*   < > 7.9* 8.2* 8.4*   BILIRUBIN 0.3  --  0.3  --  0.4  --   --   --   --    ALK PHOS 350*  --  555*  --  639*  --   --   --   --    ALT (SGPT) 12  --  14  --  12  --   --   --   --    AST (SGOT) 12  --  20  --  17  --   --   --   --    GLUCOSE 328*   < > 346*   < > 287*   < > 211* 180* 162*    < > = values in this interval not displayed.     TB Skin Test (Reading)  Order: [id number]  Status:  Final result   Visible to patient:  No (not released) Next appt:  None  Specimen Information: Blood         0 Result Notes  Component   Ref Range & Units 2 d ago   TB Skin Test  Negative    Induration   0 - 10 mm 0    Read Date & Time  7/28/21 1830            TSPOT  Order: [id number]  Status:  Final result   Visible to patient:  No (not released) Next appt:  None  Specimen Information: Blood         0 Result Notes  Component   Ref Range & Units 3 d ago   TSPOTTB   Negative Negative    T-SPOT Panel A  0    T-SPOT Panel B  0    TSPOT NIL CONTROL INTERP  Passed    TSPOT POS CONTROL INTERP  Passed    Resulting Agency [facility]         Specimen Collected: 07/27/21 03:57 Last Resulted: 07/29/21 09:50             Contains abnormal data QuantiFERON TB Plus Client Incubated  Order: [id number]  Status:  Final result   Visible to patient:  No (not released) Next appt:  None  Specimen Information: Blood         0 Result Notes  Component   Ref Range & Units 3 d ago   QuantiFERON Criteria  Comment    Comment: The QuantiFERON-TB Gold Plus result is determined by subtracting   the Nil value from either TB antigen (Ag) tube. The mitogen tube   serves as a control for the test.   QUANTIFERON TB1 AG VALUE   IU/mL 0.00    QUANTIFERON TB2 AG VALUE   IU/mL 0.01    QuantiFERON Nil Value   IU/mL 0.00    QuantiFERON Mitogen Value   IU/mL 0.00    QUANTIFERON-TB GOLD PLUS   Negative IndeterminateAbnormal     Comment: Mitogen (positive control) gave low response. This may occur  due to   suboptimal pre-analytical handling.   The specimen received for QuantiFERON testing was incubated by the   ordering institution. Specific procedures outlined in our Directory   of Services and in the package insert for the QuantiFERON Gold   (In Tube) test must be followed to enable for proper stimulation of   cells for the production of interferon gamma.   Chemiluminescence immunoassay methodology   Resulting Agency [facility]      Narrative  Performed by: [facility]  Performed at:  [address] [facility]  [id number]   : Chirag Webber PhD, Phone:  [phone]      Specimen Collected: 07/27/21 03:57 Last Resulted: 07/30/21 06:09             Culture Results:  Respiratory collected last night greater than 25 WBCs, few epithelial cells, rare gram-negative bacilli    Results for BREE NAYLOR (MRN [id number]) as of 8/1/2021 14:07   Ref. Range 7/31/2021 13:43   Color, UA Latest Ref Range: Yellow, Straw  Yellow   Appearance, UA Latest Ref Range: Clear  Turbid (A)   Specific Gravity, UA Latest Ref Range: >1.005-<1.030  1.025   pH, UA Latest Ref Range: 5.0 - 8.0  8.0   Glucose Latest Ref Range: Negative  100 mg/dL (Trace) (A)   Ketones, UA Latest Ref Range: Negative  Negative   Blood, UA Latest Ref Range: Negative  Large (3+) (A)   Nitrite, UA Latest Ref Range: Negative  Negative   Leukocytes, UA Latest Ref Range: Negative  Large (3+) (A)   Protein, UA Latest Ref Range: Negative  >=300 mg/dL (3+) (A)   Bilirubin, UA Latest Ref Range: Negative  Negative   Urobilinogen, UA Latest Ref Range: 0.2 - 1.0 E.U./dL  0.2 E.U./dL   RBC, UA Latest Ref Range: None Seen /HPF 13-20 (A)   WBC, UA Latest Ref Range: None Seen /HPF Too Numerous to Count (A)   Bacteria, UA Latest Ref Range: None Seen /HPF 4+ (A)   Squamous Epithelial Cells, UA Latest Ref Range: None Seen, 0-2 /HPF 7-12 (A)       urine with yeast      Microbiology Results Abnormal     Procedure Component Value - Date/Time     Blood Culture With VANESSA - Blood, Arm, Right [[id number]] Collected: 07/31/21 1954    Lab Status: Preliminary result Specimen: Blood from Arm, Right Updated: 08/01/21 0816     Blood Culture No growth at less than 24 hours    Blood Culture With VANESSA - Blood, Arm, Left [[id number]] Collected: 07/31/21 1953    Lab Status: Preliminary result Specimen: Blood from Arm, Left Updated: 08/01/21 0816     Blood Culture No growth at less than 24 hours    Respiratory Culture - Sputum, ET Suction [[id number]] Collected: 07/31/21 1945    Lab Status: Preliminary result Specimen: Sputum from ET Suction Updated: 08/01/21 0603     Gram Stain Greater than 25 WBCs per low power field      Few (2+) Epithelial cells per low power field      Rare (1+) Gram positive cocci    Blood Culture With VANESSA - Blood, Arm, Left [[id number]] Collected: 07/30/21 1957    Lab Status: Preliminary result Specimen: Blood from Arm, Left Updated: 07/31/21 2130     Blood Culture No growth at 24 hours    Blood Culture With VANESSA - Blood, Hand, Left [[id number]] Collected: 07/30/21 1957    Lab Status: Preliminary result Specimen: Blood from Hand, Left Updated: 07/31/21 2130     Blood Culture No growth at 24 hours    Respiratory Culture - Sputum, ET Suction [[id number]] Collected: 07/30/21 2135    Lab Status: Preliminary result Specimen: Sputum from ET Suction Updated: 07/31/21 0613     Gram Stain Greater than 25 WBCs per low power field      Few (2+) Epithelial cells per low power field      Rare (1+) Gram negative bacilli    Blood Culture - Blood, Blood, Arterial Line [[id number]] Collected: 07/22/21 1142    Lab Status: Final result Specimen: Blood, Arterial Line Updated: 07/27/21 1215     Blood Culture No growth at 5 days    Blood Culture - Blood, Hand, Right [[id number]] Collected: 07/22/21 1006    Lab Status: Final result Specimen: Blood from Hand, Right Updated: 07/27/21 1031     Blood Culture No growth at 5 days    Blood Culture - Blood, Arm, Left [[id number]] Collected:  07/16/21 1115    Lab Status: Final result Specimen: Blood from Arm, Left Updated: 07/21/21 1145     Blood Culture No growth at 5 days    Blood Culture - Blood, Arm, Left [[id number]] Collected: 07/16/21 0957    Lab Status: Final result Specimen: Blood from Arm, Left Updated: 07/21/21 1030     Blood Culture No growth at 5 days    MRSA Screen, PCR (Inpatient) - Swab, Nares [[id number]]  (Normal) Collected: 07/16/21 1418    Lab Status: Final result Specimen: Swab from Nares Updated: 07/16/21 1558     MRSA PCR No MRSA Detected    S. Pneumo Ag Urine or CSF - Urine, Urine, Clean Catch [[id number]]  (Normal) Collected: 07/16/21 1331    Lab Status: Final result Specimen: Urine, Clean Catch Updated: 07/16/21 1415     Strep Pneumo Ag Negative    Legionella Antigen, Urine - Urine, Urine, Clean Catch [[id number]]  (Normal) Collected: 07/16/21 1331    Lab Status: Final result Specimen: Urine, Clean Catch Updated: 07/16/21 1415     LEGIONELLA ANTIGEN, URINE Negative    COVID-19,[facility] IN-HOUSE,Nasal Swab No Transport Media 3-4 HR TAT - Swab, Nasal Cavity [[id number]]  (Normal) Collected: 07/16/21 1002    Lab Status: Final result Specimen: Swab from Nasal Cavity Updated: 07/16/21 1059     COVID19 Not Detected    Narrative:      Fact sheet for providers: https://www.fda.gov/media/403839/download     Fact sheet for patients: https://www.fda.gov/media/402719/download    Test performed by PCR.    Consider negative results in combination with clinical observations, patient history, and epidemiological information.        AFB Culture - Wash, Bronchus  Order: [id number]  Status:  Preliminary result   Visible to patient:  No (not released) Next appt:  None  Specimen Information: Bronchus; Wash         0 Result Notes  AFB Culture Acid Fast Bacilli isolatedAbnormal        DATE SENT 7/26/21       SENT TO STATE? Sent to state for ID              AFB Stain  No acid fast bacilli seen on direct smear      No acid fast bacilli seen on concentrated  smear            Resulting Agency:  [facility]         Specimen Collected: 07/16/21 17:01                   Radiology:   Imaging Results (Last 72 Hours)     Procedure Component Value Units Date/Time    XR Chest 1 View [[id number]] Collected: 08/01/21 0916     Updated: 08/01/21 0920    Narrative:      Frontal supine radiograph of the chest 8/1/2021 4:09 AM CDT     History: intubated; I46.9-Cardiac arrest, cause unspecified     Comparison: Exam dated 7/31/2021      Findings:      Lines and tubes are stable in position. Bilateral, essentially diffuse  patchy pulmonary infiltrates are unchanged. Overall lung volumes are  stable. No pneumothorax. The cardiomediastinal silhouette and pulmonary  vascularity are unchanged.       No acute osseous or soft tissue abnormality is noted.        Impression:      Impression:   1. No significant interval change.  2. Lung volumes are stable. ET tube is in good position.  3. Bilateral, essentially diffuse pulmonary infiltrates are unchanged.        This report was finalized on 08/01/2021 09:17 by Dr Onofre Stahl, .    XR Chest 1 View [[id number]] Collected: 07/31/21 0931     Updated: 07/31/21 0935    Narrative:      Frontal supine radiograph of the chest 7/31/2021 5:34 AM CDT     History: intubated; I46.9-Cardiac arrest, cause unspecified     Comparison: 7/30/2021      Findings:   Lines and tubes are stable in position. No new opacities or  pneumothoraces are visualized in the chest. The cardiomediastinal  silhouette and pulmonary vascularity are unchanged.       No acute osseous or soft tissue abnormality is noted.        Impression:      Impression:   1. No significant interval change since previous exam.        This report was finalized on 07/31/2021 09:32 by Dr. Renard Cuevas MD.    XR Chest 1 View [[id number]] Collected: 07/30/21 1106     Updated: 07/30/21 1109    Narrative:      Frontal supine radiograph of the chest 7/30/2021 10:28 AM CDT     History: ett placement;  I46.9-Cardiac arrest, cause unspecified     Comparison: Chest x-ray dated 7/30/2021.      Findings:   Lines and tubes are stable in position. No new opacities or  pneumothoraces are visualized in the chest. The cardiomediastinal  silhouette and pulmonary vascularity are unchanged.       No acute osseous or soft tissue abnormality is noted.        Impression:      Impression:   1.   No significant interval change since previous exam.        This report was finalized on 07/30/2021 11:06 by Dr. Hortencia Lester MD.    XR Chest 1 View [[id number]] Collected: 07/30/21 0711     Updated: 07/30/21 0715    Narrative:      EXAM: XR CHEST 1 VW- 7/30/2021 3:20 AM CDT     HISTORY: intubated; I46.9-Cardiac arrest, cause unspecified       COMPARISON: July 29, 2021.     TECHNIQUE: Frontal radiograph of the chest     FINDINGS:   Patchy infiltrates are noted throughout the right lung. There is  atelectasis left lower lobe. There is no improvement in the chest  compared to July 29.. Cardiac silhouettes normal. Endotracheal tube,  nasogastric tube and internal jugular catheter stable in position.      The osseous structures and surrounding soft tissues demonstrate no acute  abnormality.          Impression:      1. Stable chest with persistent bilateral interstitial and airspace  filling infiltrates..        This report was finalized on 07/30/2021 07:12 by Dr. Ronnie Hutchinson MD.          Assessment/Plan     Active Hospital Problems    Diagnosis    • **Cardiac arrest (CMS/HCC)    • Acute kidney injury (CMS/HCC)    • Acute CVA (cerebrovascular accident) (CMS/HCC)    • Moderate malnutrition (CMS/HCC)    • UTI due to Klebsiella species    • Aspiration pneumonia of left lower lobe due to vomit (MRSA, Klebsiella, and Aspergillus on culture)    • Acute respiratory failure with hypoxia (CMS/HCC)    • Right middle and lower pulmonary embolus (CMS/HCC)    • Closed fracture of rib due to CPR    • Shock, septic (CMS/HCC)    • Lactic acidosis    •  Non-traumatic rhabdomyolysis    • Type 2 diabetes mellitus with hyperglycemia, with long-term current use of insulin (CMS/HCC)    • Leukocytosis    • Normocytic anemia        IMPRESSION:  1. AFB growing from bronchoscopy culture-T spot negative.  PPD negative.  QuantiFERON gold indeterminate.  Contact the state lab was not able to find any acid-fast bacilli on specimen that they received  2. Bilateral pulmonary infiltrates-treated as as pneumonia  3. Uncontrolled diabetes mellitus with hemoglobin A1c over 15  4. Yeast in urine-on repeat culture this time via in and out cath  5. Heavy growth of MRSA from respiratory culture from July 16  6. Scant growth of Klebsiella pneumonia from respiratory culture July 16  7. Fever-resolved then recurred July 30  8. Likely hypoxic  brain injury  9. Left temporal lobe stroke  10. Pulmonary embolism  11. Acute kidney injury-on hemodialysis  12. Thrombocytopenia-resolved      RECOMMENDATION:   · Follow blood cultures  · Placed order for micro to work-up the yeast for identification and susceptibilities  · Started on fluconazole  · To complete levofloxacin today (scheduled every 48 hours)  · We will resume more broad empiric antibiotic therapy if hypotension, positive culture, etc.    Discussed with DARRION Langley MD  08/01/21  13:10 CDT

## 2021-08-01 NOTE — PROGRESS NOTES
Nephrology ([facility]) Progress Note      Patient:  Ford High  YOB: 1965  Date of Service: 7/31/2021  MRN: [id number]   Acct: [id number]   Primary Care Physician: Denys Yanez Jr., MD  Advance Directive:   Code Status and Medical Interventions:   Ordered at: 07/28/21 1341     Level Of Support Discussed With:    Next of Kin (If No Surrogate)     Code Status:    No CPR     Medical Interventions (Level of Support Prior to Arrest):    Full     Comments:    sisterAmy     Admit Date: 7/16/2021       Hospital Day: 15  Referring Provider: No Known Provider      Patient personally seen and examined.  Complete chart including Consults, Notes, Operative Reports, Labs, Cardiology, and Radiology studies reviewed as able.        Subjective:  Ford High is a 56 y.o. male  whom we were consulted for acute kidney injury. No prior known renal issues. History of type 2 diabetes that is poorly controlled, chronic diarrhea. Patient was found in distress at home by family. After EMS arrival he went into cardiac arrest. Resuscitated and [facility]. Briefly lost pulses again in ER.  CT angiogram in ER revealed large right pulmonary emboli.  Initially was on Levophed drip but this has been weaned off. Creatinine stable for first several days of hospitalization but has trended higher over 48 hours (0.85 >1.28 >2.20). urine output had also declined.  No improvement with IV fluids.  Received one unit PRBC on 7/22. Had femoral vascath placed on 7/23 and first hemodialysis was on 7/24.     Today remains intubated and sedated.  Urine output anuric.  Tolerating dialysis.  Fever noted overnight.  Reviewed with nursing.      Allergies:  Patient has no known allergies.    Home Meds:  Medications Prior to Admission   Medication Sig Dispense Refill Last Dose   • albuterol (PROVENTIL) (5 MG/ML) 0.5% nebulizer solution Take 2.5 mg by nebulization Every 6 (Six) Hours As  Needed for Wheezing.   Unknown at Unknown time   • aspirin 81 MG chewable tablet Chew 81 mg Daily.   Unknown at Unknown time   • atorvastatin (LIPITOR) 20 MG tablet Take 20 mg by mouth Daily.   Unknown at Unknown time   • cefdinir (OMNICEF) 300 MG capsule Take 1 capsule by mouth 2 (Two) Times a Day. 10 capsule 0    • dicyclomine (BENTYL) 20 MG tablet Take 20 mg by mouth 4 (Four) Times a Day As Needed (cramping and diarrhea).   Unknown at Unknown time   • diphenoxylate-atropine (LOMOTIL) 2.5-0.025 MG per tablet Take 1-2 tablets by mouth 3 (Three) Times a Day As Needed for Diarrhea.   Unknown at Unknown time   • fluticasone (FLONASE) 50 MCG/ACT nasal spray 2 sprays into the nostril(s) as directed by provider Daily.   Unknown at Unknown time   • insulin aspart (NOVOLOG FLEXPEN) 100 UNIT/ML solution pen-injector sc pen Inject 5 Units under the skin into the appropriate area as directed 3 (Three) Times a Day With Meals. (Patient taking differently: Inject 10 Units under the skin into the appropriate area as directed 3 (Three) Times a Day With Meals.)   Unknown at Unknown time   • insulin glargine (LANTUS) 100 UNIT/ML injection Inject 25 Units under the skin into the appropriate area as directed Every Night.  12 Unknown at Unknown time   • metoprolol tartrate (LOPRESSOR) 25 MG tablet Take 25 mg by mouth Daily.   Unknown at Unknown time   • midodrine (PROAMATINE) 10 MG tablet Take 10 mg by mouth 2 (Two) Times a Day.   Unknown at Unknown time   • riFAXIMin (XIFAXAN) 550 MG tablet Take 550 mg by mouth Every 8 (Eight) Hours.          Medicines:  Current Facility-Administered Medications   Medication Dose Route Frequency Provider Last Rate Last Admin   • acetaminophen (TYLENOL) tablet 650 mg  650 mg Oral Q4H PRN Jasbir Pickard MD   650 mg at 07/30/21 1918    Or   • acetaminophen (TYLENOL) suppository 650 mg  650 mg Rectal Q4H PRN Jasbir Pickard MD       • apixaban (ELIQUIS) tablet 5 mg  5 mg Oral Q12H Jasbir Pickard  MD Tawanda   5 mg at 07/31/21 2007   • atorvastatin (LIPITOR) tablet 10 mg  10 mg Oral Nightly Christ Bassett MD   10 mg at 07/31/21 2007   • dexmedetomidine (PRECEDEX) 400 mcg in 100 mL NS infusion  0.2-1.5 mcg/kg/hr Intravenous Titrated Christ Bassett MD   Held at 07/23/21 0912   • dextrose (D50W) 25 g/ 50mL Intravenous Solution 25 g  25 g Intravenous Q15 Min PRN Jasbir Pickard MD   25 g at 07/17/21 0537   • dextrose (GLUTOSE) oral gel 15 g  15 g Oral Q15 Min PRN Jasbir Pickard MD       • glucagon (human recombinant) (GLUCAGEN DIAGNOSTIC) injection 1 mg  1 mg Subcutaneous Q15 Min PRN Jasbir Pickard MD       • guaiFENesin (ROBITUSSIN) 100 MG/5ML oral solution 400 mg  400 mg Nasogastric Q6H Jasbir Pickard MD   400 mg at 07/31/21 2007   • heparin (porcine) injection 3,200 Units  3,200 Units Intracatheter PRN Chele Kasper MD   3,200 Units at 07/30/21 1223   • HYDROmorphone (DILAUDID) injection 0.5 mg  0.5 mg Intravenous Q2H PRN Jasbir Pickard MD   0.5 mg at 07/30/21 0248   • insulin detemir (LEVEMIR) injection 10 Units  10 Units Subcutaneous Q12H Christ Bassett MD   10 Units at 07/31/21 0801   • insulin regular (humuLIN R,novoLIN R) injection 0-9 Units  0-9 Units Subcutaneous Q6H Jasbir Pickard MD   2 Units at 07/31/21 0516   • ipratropium-albuterol (DUO-NEB) nebulizer solution 3 mL  3 mL Nebulization 4x Daily - RT Jasbir Pickard MD   3 mL at 07/31/21 1936   • levoFLOXacin (LEVAQUIN) 500 mg/100 mL D5W (premix) (LEVAQUIN) 500 mg  500 mg Intravenous Q48H Flash David MD   500 mg at 07/30/21 1259   • lidocaine (LIDODERM) 5 % 1 patch  1 patch Transdermal Q24H Jasbir Pickard MD   1 patch at 07/31/21 0801   • LORazepam (ATIVAN) injection 1 mg  1 mg Intravenous Q6H PRN Jasbir Pickard MD   1 mg at 07/29/21 1448   • norepinephrine (LEVOPHED) 8 mg in 250 mL NS infusion (premix)  0.02-0.3 mcg/kg/min Intravenous Titrated Christ Bassett MD 3.15  mL/hr at 07/31/21 1209 0.02 mcg/kg/min at 07/31/21 1209   • ondansetron (ZOFRAN) tablet 4 mg  4 mg Oral Q6H PRN Jasbir Pickard MD        Or   • ondansetron (ZOFRAN) injection 4 mg  4 mg Intravenous Q6H PRN Jasbir Pickard MD       • oxyCODONE (ROXICODONE) 5 MG/5ML solution 5 mg  5 mg Oral Q6H PRN Christ Bassett MD       • pantoprazole (PROTONIX) injection 40 mg  40 mg Intravenous Q AM Jasbir Pickard MD   40 mg at 07/31/21 0516   • sennosides-docusate (PERICOLACE) 8.6-50 MG per tablet 1 tablet  1 tablet Oral BID Jasbir Pickard MD   1 tablet at 07/31/21 2007   • sodium chloride 0.9 % bolus 100 mL  100 mL Intravenous PRN Chele Kasper MD       • sodium chloride 0.9 % flush 10 mL  10 mL Intravenous PRN Jasbir Pickard MD   10 mL at 07/27/21 0812   • sodium chloride 0.9 % infusion  25 mL/hr Intravenous Continuous Brown Whitfield MD 25 mL/hr at 07/26/21 0602 25 mL/hr at 07/26/21 0602       Past Medical History:  Past Medical History:   Diagnosis Date   • Autonomic neuropathy    • Chronic diarrhea    • Diabetes mellitus (CMS/HCC)    • Diabetic foot ulcer (CMS/HCC)    • Elevated cholesterol    • Lateral epicondylitis, right elbow    • Noncompliance    • Orthostatic hypotension    • Skin infection        Past Surgical History:  Past Surgical History:   Procedure Laterality Date   • TONSILLECTOMY         Family History  Family History   Problem Relation Age of Onset   • Diabetes Mother    • Arthritis Mother    • Hyperlipidemia Mother    • Heart disease Father    • Diabetes Father    • Arthritis Father        Social History  Social History     Socioeconomic History   • Marital status:      Spouse name: Not on file   • Number of children: Not on file   • Years of education: Not on file   • Highest education level: Not on file   Tobacco Use   • Smoking status: Never Smoker   Substance and Sexual Activity   • Alcohol use: No   • Drug use: No         Review of  Systems:  History obtained from unobtainable from patient due to mental status      Objective:  Patient Vitals for the past 24 hrs:   BP Temp Temp src Pulse Resp SpO2 Weight   07/31/21 2330 120/58 -- -- 93 -- 95 % --   07/31/21 2315 106/58 -- -- 93 -- 92 % --   07/31/21 2303 -- -- -- 93 (!) 34 92 % --   07/31/21 2245 93/74 -- -- 93 -- -- --   07/31/21 2230 -- -- -- 94 -- 100 % --   07/31/21 2215 111/56 -- -- 92 -- 99 % --   07/31/21 2200 121/61 -- -- 92 -- 98 % --   07/31/21 2145 114/60 -- -- 93 -- 100 % --   07/31/21 2130 119/59 -- -- 92 -- 99 % --   07/31/21 2115 112/57 -- -- 93 -- 98 % --   07/31/21 2100 105/55 -- -- 94 -- 98 % --   07/31/21 2045 118/61 -- -- 92 -- 99 % --   07/31/21 2030 119/56 -- -- 93 -- 98 % --   07/31/21 2015 117/57 -- -- 92 -- 98 % --   07/31/21 2000 122/58 -- -- 96 -- 97 % --   07/31/21 1949 -- -- -- 96 -- 97 % --   07/31/21 1945 115/59 100.3 °F (37.9 °C) Axillary 95 -- 96 % --   07/31/21 1936 -- -- -- 94 27 97 % --   07/31/21 1930 120/65 -- -- 93 -- 97 % --   07/31/21 1915 101/49 -- -- 94 -- 96 % --   07/31/21 1900 122/57 -- -- 94 -- 98 % --   07/31/21 1845 124/63 -- -- 94 -- 98 % --   07/31/21 1830 109/59 -- -- 94 -- 98 % --   07/31/21 1815 116/68 -- -- 94 -- 98 % --   07/31/21 1800 122/65 -- -- 94 -- 98 % --   07/31/21 1745 120/59 -- -- 92 -- 98 % --   07/31/21 1730 115/58 -- -- 93 -- 98 % --   07/31/21 1715 112/58 -- -- 94 -- 98 % --   07/31/21 1700 117/63 -- -- 95 -- 97 % --   07/31/21 1630 119/62 -- -- 92 -- 98 % --   07/31/21 1615 124/60 -- -- 89 -- 99 % --   07/31/21 1600 113/60 -- -- 91 -- 97 % --   07/31/21 1545 121/66 -- -- 91 -- 97 % --   07/31/21 1530 116/56 -- -- 93 -- 97 % --   07/31/21 1515 102/59 98 °F (36.7 °C) Axillary 92 -- 97 % --   07/31/21 1500 106/57 -- -- 92 -- 97 % --   07/31/21 1445 113/62 -- -- 92 -- 96 % --   07/31/21 1430 111/65 -- -- 89 26 100 % --   07/31/21 1423 -- -- -- 90 27 99 % --   07/31/21 1415 117/59 -- -- 89 -- 96 % --   07/31/21 1400 112/57 -- --  89 -- 97 % --   07/31/21 1345 117/60 -- -- 89 -- 96 % --   07/31/21 1330 115/57 -- -- 88 -- 96 % --   07/31/21 1315 120/61 -- -- 87 -- 96 % --   07/31/21 1300 115/61 -- -- 84 -- 97 % --   07/31/21 1245 122/68 -- -- 85 -- 98 % --   07/31/21 1230 103/68 -- -- 86 -- 96 % --   07/31/21 1215 (!) 84/57 -- -- 86 -- 93 % --   07/31/21 1200 (!) 86/50 -- -- 87 -- 93 % --   07/31/21 1147 -- -- -- 84 -- -- --   07/31/21 1145 95/54 -- -- 84 -- 97 % --   07/31/21 1141 -- -- -- 84 22 95 % --   07/31/21 1130 90/58 -- -- 85 -- 94 % --   07/31/21 1115 95/54 -- -- 83 -- 94 % --   07/31/21 1100 91/54 -- -- 84 -- 95 % --   07/31/21 0845 97/57 -- -- 86 -- 95 % --   07/31/21 0830 92/55 -- -- 86 -- 94 % --   07/31/21 0815 97/55 -- -- 87 -- 95 % --   07/31/21 0800 93/59 -- -- 87 -- 93 % --   07/31/21 0756 -- 97.7 °F (36.5 °C) -- 87 -- 93 % --   07/31/21 0745 91/56 -- -- 88 -- 93 % --   07/31/21 0730 90/53 -- -- 88 -- 92 % --   07/31/21 0715 -- -- -- 90 -- 95 % --   07/31/21 0705 -- -- -- -- 22 -- --   07/31/21 0700 91/53 -- -- 87 -- 97 % --   07/31/21 0658 -- -- -- 87 22 97 % --   07/31/21 0645 98/56 -- -- 88 -- 94 % --   07/31/21 0630 -- -- -- 87 -- 94 % --   07/31/21 0615 -- -- -- 88 -- 93 % --   07/31/21 0600 92/49 -- -- 88 -- 94 % --   07/31/21 0545 91/54 -- -- 88 -- 93 % --   07/31/21 0530 93/60 -- -- 87 -- 95 % --   07/31/21 0515 91/49 -- -- 86 -- 93 % --   07/31/21 0500 93/54 -- -- 86 -- 94 % --   07/31/21 0445 90/50 -- -- 86 -- 93 % --   07/31/21 0430 93/56 -- -- 86 -- 94 % --   07/31/21 0415 99/54 -- -- 86 -- 94 % --   07/31/21 0400 100/59 98.1 °F (36.7 °C) Axillary 87 -- 94 % 86.7 kg (191 lb 3.2 oz)   07/31/21 0345 93/60 -- -- 87 -- 94 % --   07/31/21 0335 -- -- -- 87 (!) 33 94 % --   07/31/21 0330 91/53 -- -- 87 -- 93 % --   07/31/21 0315 95/58 -- -- 85 -- 94 % --   07/31/21 0300 94/52 -- -- 86 -- 94 % --   07/31/21 0245 93/56 -- -- 86 -- 93 % --   07/31/21 0230 97/55 -- -- 86 -- 94 % --   07/31/21 0215 92/56 -- -- 88 -- 93 % --    07/31/21 0200 97/52 -- -- 88 -- 94 % --   07/31/21 0145 99/58 -- -- 86 -- 94 % --   07/31/21 0130 97/56 -- -- 88 -- 93 % --   07/31/21 0115 98/56 -- -- 88 -- 93 % --   07/31/21 0100 96/55 -- -- 89 -- 93 % --   07/31/21 0045 93/55 -- -- 88 -- 93 % --   07/31/21 0030 98/51 -- -- 88 -- 93 % --   07/31/21 0015 97/55 -- -- 88 -- 93 % --   07/31/21 0000 90/49 98.6 °F (37 °C) Axillary 85 -- 92 % --       Intake/Output Summary (Last 24 hours) at 7/31/2021 2351  Last data filed at 7/31/2021 1945  Gross per 24 hour   Intake 2015.08 ml   Output 1650 ml   Net 365.08 ml     General: Ill-appearing on vent  Chest:  clear to auscultation bilaterally without respiratory distress  CVS: regular rate and rhythm  Abdominal: soft, nontender, positive bowel sounds  Extremities: ble edema  Skin: warm and dry without rash      Labs:  Results from last 7 days   Lab Units 07/31/21 0727 07/30/21 0307 07/29/21  1215   WBC 10*3/mm3 13.75* 11.05* 10.40   HEMOGLOBIN g/dL 7.7* 8.0* 8.2*   HEMATOCRIT % 23.5* 23.7* 24.0*   PLATELETS 10*3/mm3 158 96* 93*         Results from last 7 days   Lab Units 07/31/21  0727 07/30/21  0306 07/29/21  1215 07/28/21  0355 07/28/21  0355 07/27/21  0357 07/27/21  0357   SODIUM mmol/L 134* 132* 133*   < > 133*   < > 133*   POTASSIUM mmol/L 4.6 5.3* 4.7   < > 4.7   < > 4.2   CHLORIDE mmol/L 98 99 99   < > 101   < > 101   CO2 mmol/L 28.0 24.0 23.0   < > 19.0*   < > 20.0*   BUN mg/dL 57* 71* 58*   < > 52*   < > 34*   CREATININE mg/dL 3.57* 4.34* 4.00*   < > 4.19*   < > 3.43*   CALCIUM mg/dL 8.2* 7.9* 8.5*   < > 8.8   < > 8.3*   BILIRUBIN mg/dL  --   --  0.4  --  0.3  --  0.3   ALK PHOS U/L  --   --  639*  --  555*  --  350*   ALT (SGPT) U/L  --   --  12  --  14  --  12   AST (SGOT) U/L  --   --  17  --  20  --  12   GLUCOSE mg/dL 180* 211* 287*   < > 346*   < > 328*    < > = values in this interval not displayed.       Radiology:   Imaging Results (Last 72 Hours)     Procedure Component Value Units Date/Time    XR  Chest 1 View [[id number]] Collected: 07/31/21 0931     Updated: 07/31/21 0935    Narrative:      Frontal supine radiograph of the chest 7/31/2021 5:34 AM CDT     History: intubated; I46.9-Cardiac arrest, cause unspecified     Comparison: 7/30/2021      Findings:   Lines and tubes are stable in position. No new opacities or  pneumothoraces are visualized in the chest. The cardiomediastinal  silhouette and pulmonary vascularity are unchanged.       No acute osseous or soft tissue abnormality is noted.        Impression:      Impression:   1. No significant interval change since previous exam.        This report was finalized on 07/31/2021 09:32 by Dr. Renard Cuevas MD.    XR Chest 1 View [[id number]] Collected: 07/30/21 1106     Updated: 07/30/21 1109    Narrative:      Frontal supine radiograph of the chest 7/30/2021 10:28 AM CDT     History: ett placement; I46.9-Cardiac arrest, cause unspecified     Comparison: Chest x-ray dated 7/30/2021.      Findings:   Lines and tubes are stable in position. No new opacities or  pneumothoraces are visualized in the chest. The cardiomediastinal  silhouette and pulmonary vascularity are unchanged.       No acute osseous or soft tissue abnormality is noted.        Impression:      Impression:   1.   No significant interval change since previous exam.        This report was finalized on 07/30/2021 11:06 by Dr. Hortencia Lester MD.    XR Chest 1 View [[id number]] Collected: 07/30/21 0711     Updated: 07/30/21 0715    Narrative:      EXAM: XR CHEST 1 VW- 7/30/2021 3:20 AM CDT     HISTORY: intubated; I46.9-Cardiac arrest, cause unspecified       COMPARISON: July 29, 2021.     TECHNIQUE: Frontal radiograph of the chest     FINDINGS:   Patchy infiltrates are noted throughout the right lung. There is  atelectasis left lower lobe. There is no improvement in the chest  compared to July 29.. Cardiac silhouettes normal. Endotracheal tube,  nasogastric tube and internal jugular catheter stable  in position.      The osseous structures and surrounding soft tissues demonstrate no acute  abnormality.          Impression:      1. Stable chest with persistent bilateral interstitial and airspace  filling infiltrates..        This report was finalized on 07/30/2021 07:12 by Dr. Ronnie Hutchinson MD.    XR Chest 1 View [[id number]] Collected: 07/29/21 0807     Updated: 07/29/21 0811    Narrative:      Frontal supine radiograph of the chest 7/29/2021 5:22 AM CDT     History: intubated; I46.9-Cardiac arrest, cause unspecified     Comparison: Chest X ray dated 7/28/2021.      Findings:   Lines and tubes are stable in position. No new opacities or  pneumothoraces are visualized in the chest. The cardiomediastinal  silhouette and pulmonary vascularity are unchanged.       No acute osseous or soft tissue abnormality is noted.        Impression:      Impression:   1.   No significant interval change since previous exam.        This report was finalized on 07/29/2021 08:08 by Dr. Hortencia Lester MD.          Culture:  Blood Culture   Date Value Ref Range Status   07/30/2021 No growth at 24 hours  Preliminary   07/30/2021 No growth at 24 hours  Preliminary     Urine Culture   Date Value Ref Range Status   07/25/2021 >100,000 CFU/mL Yeast isolated (A)  Final     Comment:     No further work-up.         Assessment   Acute kidney injury - ATN / vancomycin toxicity  Acute hypoxic respiratory failure which required mechanical ventilation  S/p cardiac arrest on 7/16  Left lower lobe aspiration pneumonia  Right middle and lower lobe pulmonary embolus  Septic shock  Type 2 diabetes  Anemia - s/p PRBC on 7/22  Acute left temporal infarct on MRI  Metabolic encephalopathy     Plan:  HD next planned for Monday as needed  Abx per ID  Monitor for recovery  Continue to transfuse prbc as needed  Family planning for guardianship noted      Chele Kasper MD  7/31/2021  23:51 CDT

## 2021-08-02 NOTE — PROGRESS NOTES
Nephrology ([facility]) Progress Note      Patient:  Ford High  YOB: 1965  Date of Service: 8/1/2021  MRN: [id number]   Acct: [id number]   Primary Care Physician: Denys Yanez Jr., MD  Advance Directive:   Code Status and Medical Interventions:   Ordered at: 07/28/21 1341     Level Of Support Discussed With:    Next of Kin (If No Surrogate)     Code Status:    No CPR     Medical Interventions (Level of Support Prior to Arrest):    Full     Comments:    sisterAmy     Admit Date: 7/16/2021       Hospital Day: 16  Referring Provider: No Known Provider      Patient personally seen and examined.  Complete chart including Consults, Notes, Operative Reports, Labs, Cardiology, and Radiology studies reviewed as able.        Subjective:  Ford High is a 56 y.o. male  whom we were consulted for acute kidney injury. No prior known renal issues. History of type 2 diabetes that is poorly controlled, chronic diarrhea. Patient was found in distress at home by family. After EMS arrival he went into cardiac arrest. Resuscitated and transported to [facility] ER. Briefly lost pulses again in ER.  CT angiogram in ER revealed large right pulmonary emboli.  Initially was on Levophed drip but this has been weaned off. Creatinine stable for first several days of hospitalization but has trended higher over 48 hours (0.85 >1.28 >2.20). urine output had also declined.  No improvement with IV fluids.  Received one unit PRBC on 7/22. Had femoral vascath placed on 7/23 and first hemodialysis was on 7/24.     Today remains intubated and sedated but moved to different ICU room.  Urine output anuric.  Tolerating dialysis.  Minimal fever noted overnight.  Reviewed with nursing.      Allergies:  Patient has no known allergies.    Home Meds:  Medications Prior to Admission   Medication Sig Dispense Refill Last Dose   • albuterol (PROVENTIL) (5 MG/ML) 0.5% nebulizer solution Take 2.5 mg by  nebulization Every 6 (Six) Hours As Needed for Wheezing.   Unknown at Unknown time   • aspirin 81 MG chewable tablet Chew 81 mg Daily.   Unknown at Unknown time   • atorvastatin (LIPITOR) 20 MG tablet Take 20 mg by mouth Daily.   Unknown at Unknown time   • cefdinir (OMNICEF) 300 MG capsule Take 1 capsule by mouth 2 (Two) Times a Day. 10 capsule 0    • dicyclomine (BENTYL) 20 MG tablet Take 20 mg by mouth 4 (Four) Times a Day As Needed (cramping and diarrhea).   Unknown at Unknown time   • diphenoxylate-atropine (LOMOTIL) 2.5-0.025 MG per tablet Take 1-2 tablets by mouth 3 (Three) Times a Day As Needed for Diarrhea.   Unknown at Unknown time   • fluticasone (FLONASE) 50 MCG/ACT nasal spray 2 sprays into the nostril(s) as directed by provider Daily.   Unknown at Unknown time   • insulin aspart (NOVOLOG FLEXPEN) 100 UNIT/ML solution pen-injector sc pen Inject 5 Units under the skin into the appropriate area as directed 3 (Three) Times a Day With Meals. (Patient taking differently: Inject 10 Units under the skin into the appropriate area as directed 3 (Three) Times a Day With Meals.)   Unknown at Unknown time   • insulin glargine (LANTUS) 100 UNIT/ML injection Inject 25 Units under the skin into the appropriate area as directed Every Night.  12 Unknown at Unknown time   • metoprolol tartrate (LOPRESSOR) 25 MG tablet Take 25 mg by mouth Daily.   Unknown at Unknown time   • midodrine (PROAMATINE) 10 MG tablet Take 10 mg by mouth 2 (Two) Times a Day.   Unknown at Unknown time   • riFAXIMin (XIFAXAN) 550 MG tablet Take 550 mg by mouth Every 8 (Eight) Hours.          Medicines:  Current Facility-Administered Medications   Medication Dose Route Frequency Provider Last Rate Last Admin   • acetaminophen (TYLENOL) tablet 650 mg  650 mg Oral Q4H PRN Jasbir Pickard MD   650 mg at 07/30/21 1918    Or   • acetaminophen (TYLENOL) suppository 650 mg  650 mg Rectal Q4H PRN Jasbir Pickard MD       • apixaban (ELIQUIS) tablet  5 mg  5 mg Oral Q12H Jasbir Pickard MD   5 mg at 08/01/21 0800   • atorvastatin (LIPITOR) tablet 10 mg  10 mg Oral Nightly Christ Bassett MD   10 mg at 07/31/21 2007   • dexmedetomidine (PRECEDEX) 400 mcg in 100 mL NS infusion  0.2-1.5 mcg/kg/hr Intravenous Titrated Christ Bassett MD   Held at 07/23/21 0912   • dextrose (D50W) 25 g/ 50mL Intravenous Solution 25 g  25 g Intravenous Q15 Min PRN Jasbir Pickard MD   25 g at 07/17/21 0537   • dextrose (GLUTOSE) oral gel 15 g  15 g Oral Q15 Min PRN Jasbir Pickard MD       • fluconazole (DIFLUCAN) IVPB 100 mg  100 mg Intravenous Daily Radha Mondragon MD 50 mL/hr at 08/01/21 1512 100 mg at 08/01/21 1512   • glucagon (human recombinant) (GLUCAGEN DIAGNOSTIC) injection 1 mg  1 mg Subcutaneous Q15 Min PRN Jasbir Pickard MD       • guaiFENesin (ROBITUSSIN) 100 MG/5ML oral solution 400 mg  400 mg Nasogastric Q6H Jasbir Pickard MD   400 mg at 08/01/21 1511   • heparin (porcine) injection 3,200 Units  3,200 Units Intracatheter PRN Chele Kasper MD   3,200 Units at 07/30/21 1223   • HYDROmorphone (DILAUDID) injection 0.5 mg  0.5 mg Intravenous Q2H PRN Jasbir Pickard MD   0.5 mg at 07/30/21 0248   • insulin detemir (LEVEMIR) injection 10 Units  10 Units Subcutaneous Q12H Christ Bassett MD   10 Units at 08/01/21 0800   • insulin regular (humuLIN R,novoLIN R) injection 0-9 Units  0-9 Units Subcutaneous Q6H Jasbir Pickard MD   2 Units at 08/01/21 1211   • ipratropium-albuterol (DUO-NEB) nebulizer solution 3 mL  3 mL Nebulization 4x Daily - RT Jasbir Pickard MD   3 mL at 08/01/21 1930   • lidocaine (LIDODERM) 5 % 1 patch  1 patch Transdermal Q24H Jasbir Pickard MD   1 patch at 08/01/21 0801   • LORazepam (ATIVAN) injection 1 mg  1 mg Intravenous Q6H PRN Jasbir Pickard MD   1 mg at 07/29/21 1448   • norepinephrine (LEVOPHED) 8 mg in 250 mL NS infusion (premix)  0.02-0.3 mcg/kg/min Intravenous  Titrated Christ Bassett MD 3.15 mL/hr at 07/31/21 1209 0.02 mcg/kg/min at 07/31/21 1209   • ondansetron (ZOFRAN) tablet 4 mg  4 mg Oral Q6H PRN Jasbir Pickard MD        Or   • ondansetron (ZOFRAN) injection 4 mg  4 mg Intravenous Q6H PRN Jasbir Pickard MD       • oxyCODONE (ROXICODONE) 5 MG/5ML solution 5 mg  5 mg Oral Q6H PRN Christ Bassett MD   5 mg at 08/01/21 0748   • pantoprazole (PROTONIX) injection 40 mg  40 mg Intravenous Q AM Jasbir Pickard MD   40 mg at 08/01/21 0540   • sennosides-docusate (PERICOLACE) 8.6-50 MG per tablet 1 tablet  1 tablet Oral BID Jasbir Pickard MD   1 tablet at 07/31/21 2007   • sodium chloride 0.9 % bolus 100 mL  100 mL Intravenous PRN Chele Kasper MD       • sodium chloride 0.9 % flush 10 mL  10 mL Intravenous PRN Jasbir Pickard MD   10 mL at 08/01/21 0800   • sodium chloride 0.9 % infusion  25 mL/hr Intravenous Continuous Brown Whitfield MD 25 mL/hr at 07/26/21 0602 25 mL/hr at 07/26/21 0602       Past Medical History:  Past Medical History:   Diagnosis Date   • Autonomic neuropathy    • Chronic diarrhea    • Diabetes mellitus (CMS/HCC)    • Diabetic foot ulcer (CMS/HCC)    • Elevated cholesterol    • Lateral epicondylitis, right elbow    • Noncompliance    • Orthostatic hypotension    • Skin infection        Past Surgical History:  Past Surgical History:   Procedure Laterality Date   • TONSILLECTOMY         Family History  Family History   Problem Relation Age of Onset   • Diabetes Mother    • Arthritis Mother    • Hyperlipidemia Mother    • Heart disease Father    • Diabetes Father    • Arthritis Father        Social History  Social History     Socioeconomic History   • Marital status:      Spouse name: Not on file   • Number of children: Not on file   • Years of education: Not on file   • Highest education level: Not on file   Tobacco Use   • Smoking status: Never Smoker   Substance and Sexual Activity   •  Alcohol use: No   • Drug use: No         Review of Systems:  History obtained from unobtainable from patient due to mental status      Objective:  Patient Vitals for the past 24 hrs:   BP Temp Temp src Pulse Resp SpO2 Weight   08/01/21 1945 114/58 99.5 °F (37.5 °C) Axillary 97 -- 99 % --   08/01/21 1940 -- -- -- 96 -- 95 % --   08/01/21 1930 -- -- -- 94 (!) 29 92 % --   08/01/21 1900 106/51 -- -- 93 -- 94 % --   08/01/21 1815 -- -- -- 94 -- 95 % --   08/01/21 1800 104/52 -- -- 93 -- 95 % --   08/01/21 1745 102/51 -- -- 92 -- 96 % --   08/01/21 1730 97/59 -- -- 93 -- 95 % --   08/01/21 1715 99/46 -- -- 94 -- 95 % --   08/01/21 1700 (!) 87/46 98.9 °F (37.2 °C) Axillary 91 -- 95 % --   08/01/21 1645 98/45 -- -- 91 -- 95 % --   08/01/21 1630 98/52 -- -- 90 -- 96 % --   08/01/21 1615 105/53 -- -- 89 -- 96 % --   08/01/21 1600 96/52 -- -- 88 -- 97 % --   08/01/21 1515 105/54 -- -- 89 -- 96 % --   08/01/21 1500 105/60 -- -- 89 -- 95 % --   08/01/21 1445 104/62 -- -- 89 -- 96 % --   08/01/21 1432 -- -- -- 89 -- -- --   08/01/21 1430 103/67 -- -- 88 -- 100 % --   08/01/21 1424 -- -- -- 88 24 99 % --   08/01/21 1415 103/56 -- -- 88 -- 96 % --   08/01/21 1400 108/59 -- -- 88 -- 97 % --   08/01/21 1200 104/62 98.6 °F (37 °C) Axillary 88 -- 96 % --   08/01/21 1145 111/63 -- -- 88 -- 96 % --   08/01/21 1130 104/59 -- -- 88 -- 96 % --   08/01/21 1115 95/61 -- -- 89 -- 95 % --   08/01/21 1108 -- -- -- 88 -- -- --   08/01/21 1100 107/61 -- -- 87 -- 95 % --   08/01/21 1059 -- -- -- 90 24 97 % --   08/01/21 1045 113/61 -- -- 89 -- 96 % --   08/01/21 1030 111/62 -- -- 89 -- 96 % --   08/01/21 1015 110/65 -- -- 87 -- 96 % --   08/01/21 1000 103/61 -- -- 88 -- 97 % --   08/01/21 0945 107/65 -- -- 88 -- 96 % --   08/01/21 0930 115/63 -- -- 88 -- 96 % --   08/01/21 0915 111/64 -- -- 91 -- 96 % --   08/01/21 0900 107/60 -- -- 88 -- 97 % --   08/01/21 0815 110/64 -- -- 92 -- 97 % --   08/01/21 0800 106/64 98.4 °F (36.9 °C) Axillary 94 -- 95  % --   08/01/21 0745 118/57 -- -- 93 -- 96 % --   08/01/21 0730 104/70 -- -- 95 -- 96 % --   08/01/21 0715 108/66 -- -- 96 -- 96 % --   08/01/21 0700 115/57 -- -- 96 -- 96 % --   08/01/21 0645 105/56 -- -- 95 -- 97 % --   08/01/21 0641 -- -- -- 94 -- -- --   08/01/21 0633 -- -- -- 93 24 97 % --   08/01/21 0630 109/58 -- -- 94 -- 97 % --   08/01/21 0615 108/71 -- -- 93 -- 97 % --   08/01/21 0600 110/58 -- -- 94 -- 96 % --   08/01/21 0545 95/59 -- -- 92 -- 96 % --   08/01/21 0530 105/60 -- -- 94 -- 97 % --   08/01/21 0515 101/65 -- -- 94 -- 96 % --   08/01/21 0500 106/63 -- -- 94 -- 94 % --   08/01/21 0445 106/60 -- -- 94 -- 94 % --   08/01/21 0430 126/74 -- -- 91 -- 97 % --   08/01/21 0415 121/62 -- -- 94 -- 97 % --   08/01/21 0400 121/66 99.1 °F (37.3 °C) Axillary 91 -- 96 % 84.2 kg (185 lb 9.6 oz)   08/01/21 0345 -- -- -- 91 -- 95 % --   08/01/21 0330 109/63 -- -- 91 -- 96 % --   08/01/21 0315 111/69 -- -- 93 -- 95 % --   08/01/21 0300 112/60 -- -- 91 (!) 35 97 % --   08/01/21 0245 104/56 -- -- 92 -- 97 % --   08/01/21 0230 103/58 -- -- 93 -- 97 % --   08/01/21 0215 108/62 -- -- 93 -- 97 % --   08/01/21 0200 108/56 -- -- 92 -- 97 % --   08/01/21 0145 124/66 -- -- 92 -- 100 % --   08/01/21 0130 107/60 -- -- 93 -- 96 % --   08/01/21 0115 111/65 -- -- 93 -- 97 % --   08/01/21 0100 118/65 -- -- 94 -- 97 % --   08/01/21 0045 106/62 -- -- 94 -- 97 % --   08/01/21 0030 112/61 -- -- 95 -- 97 % --   08/01/21 0015 112/64 -- -- 92 -- 96 % --   08/01/21 0000 114/60 99.5 °F (37.5 °C) Axillary 92 -- 98 % --   07/31/21 2345 116/56 -- -- 92 -- 96 % --   07/31/21 2330 120/58 -- -- 93 -- 95 % --   07/31/21 2315 106/58 -- -- 93 -- 92 % --   07/31/21 2303 -- -- -- 93 (!) 34 92 % --   07/31/21 2300 124/61 -- -- 94 -- 91 % --   07/31/21 2245 93/74 -- -- 93 -- -- --   07/31/21 2230 -- -- -- 94 -- 100 % --   07/31/21 2215 111/56 -- -- 92 -- 99 % --   07/31/21 2200 121/61 -- -- 92 -- 98 % --   07/31/21 2145 114/60 -- -- 93 -- 100 % --    07/31/21 2130 119/59 -- -- 92 -- 99 % --   07/31/21 2115 112/57 -- -- 93 -- 98 % --   07/31/21 2100 105/55 -- -- 94 -- 98 % --   07/31/21 2045 118/61 -- -- 92 -- 99 % --   07/31/21 2030 119/56 -- -- 93 -- 98 % --   07/31/21 2015 117/57 -- -- 92 -- 98 % --   07/31/21 2000 122/58 -- -- 96 -- 97 % --       Intake/Output Summary (Last 24 hours) at 8/1/2021 1958  Last data filed at 8/1/2021 1945  Gross per 24 hour   Intake 2144.51 ml   Output 600 ml   Net 1544.51 ml     General: Ill-appearing on vent  Chest:  clear to auscultation bilaterally without respiratory distress  CVS: regular rate and rhythm  Abdominal: soft, nontender, positive bowel sounds  Extremities: ble edema  Skin: warm and dry without rash      Labs:  Results from last 7 days   Lab Units 08/01/21 0444 07/31/21 0727 07/30/21  0307   WBC 10*3/mm3 18.60* 13.75* 11.05*   HEMOGLOBIN g/dL 8.4* 7.7* 8.0*   HEMATOCRIT % 26.1* 23.5* 23.7*   PLATELETS 10*3/mm3 255 158 96*         Results from last 7 days   Lab Units 08/01/21 0444 07/31/21 0727 07/30/21  0306 07/29/21  1215 07/29/21  1215 07/28/21  0355 07/28/21  0355 07/27/21  0357 07/27/21  0357   SODIUM mmol/L 131* 134* 132*   < > 133*   < > 133*   < > 133*   POTASSIUM mmol/L 4.7 4.6 5.3*   < > 4.7   < > 4.7   < > 4.2   CHLORIDE mmol/L 95* 98 99   < > 99   < > 101   < > 101   CO2 mmol/L 23.0 28.0 24.0   < > 23.0   < > 19.0*   < > 20.0*   BUN mg/dL 71* 57* 71*   < > 58*   < > 52*   < > 34*   CREATININE mg/dL 4.34* 3.57* 4.34*   < > 4.00*   < > 4.19*   < > 3.43*   CALCIUM mg/dL 8.4* 8.2* 7.9*   < > 8.5*   < > 8.8   < > 8.3*   BILIRUBIN mg/dL  --   --   --   --  0.4  --  0.3  --  0.3   ALK PHOS U/L  --   --   --   --  639*  --  555*  --  350*   ALT (SGPT) U/L  --   --   --   --  12  --  14  --  12   AST (SGOT) U/L  --   --   --   --  17  --  20  --  12   GLUCOSE mg/dL 162* 180* 211*   < > 287*   < > 346*   < > 328*    < > = values in this interval not displayed.       Radiology:   Imaging Results (Last 72  Hours)     Procedure Component Value Units Date/Time    XR Chest 1 View [[id number]] Collected: 08/01/21 0916     Updated: 08/01/21 0920    Narrative:      Frontal supine radiograph of the chest 8/1/2021 4:09 AM CDT     History: intubated; I46.9-Cardiac arrest, cause unspecified     Comparison: Exam dated 7/31/2021      Findings:      Lines and tubes are stable in position. Bilateral, essentially diffuse  patchy pulmonary infiltrates are unchanged. Overall lung volumes are  stable. No pneumothorax. The cardiomediastinal silhouette and pulmonary  vascularity are unchanged.       No acute osseous or soft tissue abnormality is noted.        Impression:      Impression:   1. No significant interval change.  2. Lung volumes are stable. ET tube is in good position.  3. Bilateral, essentially diffuse pulmonary infiltrates are unchanged.        This report was finalized on 08/01/2021 09:17 by Dr Onofre Stahl, .    XR Chest 1 View [[id number]] Collected: 07/31/21 0931     Updated: 07/31/21 0935    Narrative:      Frontal supine radiograph of the chest 7/31/2021 5:34 AM CDT     History: intubated; I46.9-Cardiac arrest, cause unspecified     Comparison: 7/30/2021      Findings:   Lines and tubes are stable in position. No new opacities or  pneumothoraces are visualized in the chest. The cardiomediastinal  silhouette and pulmonary vascularity are unchanged.       No acute osseous or soft tissue abnormality is noted.        Impression:      Impression:   1. No significant interval change since previous exam.        This report was finalized on 07/31/2021 09:32 by Dr. Renard Cuevas MD.    XR Chest 1 View [[id number]] Collected: 07/30/21 1106     Updated: 07/30/21 1109    Narrative:      Frontal supine radiograph of the chest 7/30/2021 10:28 AM CDT     History: ett placement; I46.9-Cardiac arrest, cause unspecified     Comparison: Chest x-ray dated 7/30/2021.      Findings:   Lines and tubes are stable in position. No new  opacities or  pneumothoraces are visualized in the chest. The cardiomediastinal  silhouette and pulmonary vascularity are unchanged.       No acute osseous or soft tissue abnormality is noted.        Impression:      Impression:   1.   No significant interval change since previous exam.        This report was finalized on 07/30/2021 11:06 by Dr. Hortencia Lester MD.    XR Chest 1 View [[id number]] Collected: 07/30/21 0711     Updated: 07/30/21 0715    Narrative:      EXAM: XR CHEST 1 VW- 7/30/2021 3:20 AM CDT     HISTORY: intubated; I46.9-Cardiac arrest, cause unspecified       COMPARISON: July 29, 2021.     TECHNIQUE: Frontal radiograph of the chest     FINDINGS:   Patchy infiltrates are noted throughout the right lung. There is  atelectasis left lower lobe. There is no improvement in the chest  compared to July 29.. Cardiac silhouettes normal. Endotracheal tube,  nasogastric tube and internal jugular catheter stable in position.      The osseous structures and surrounding soft tissues demonstrate no acute  abnormality.          Impression:      1. Stable chest with persistent bilateral interstitial and airspace  filling infiltrates..        This report was finalized on 07/30/2021 07:12 by Dr. Ronnie Hutchinson MD.          Culture:  Blood Culture   Date Value Ref Range Status   07/30/2021 No growth at 24 hours  Preliminary   07/30/2021 No growth at 24 hours  Preliminary     Urine Culture   Date Value Ref Range Status   07/25/2021 >100,000 CFU/mL Yeast isolated (A)  Final     Comment:     No further work-up.         Assessment   Acute kidney injury - ATN / vancomycin toxicity  Acute hypoxic respiratory failure which required mechanical ventilation  S/p cardiac arrest on 7/16  Left lower lobe aspiration pneumonia  Right middle and lower lobe pulmonary embolus  Septic shock  Type 2 diabetes  Anemia - s/p PRBC on 7/22  Acute left temporal infarct on MRI  Metabolic encephalopathy     Plan:  HD next planned for Monday as  needed  Abx per ID  Monitor for recovery  Continue to transfuse prbc as needed, hemoglobin stable recently  Family planning for guardianship noted  Wean Levophed as tolerated    Chele Kasper MD  8/1/2021  19:58 CDT

## 2021-08-02 NOTE — PROGRESS NOTES
[facility] Medicine Services  INPATIENT PROGRESS NOTE    Patient Name: Ford High  Date of Admission: 7/16/2021  Today's Date: 08/02/21  Length of Stay: 17  Primary Care Physician: Denys Yanez Jr., MD    Subjective   Chief Complaint: follow-up cardiac arrest  HPI   Patient remains intubated and on mechanical ventilation.  He did receive a dose of Dilaudid earlier this morning given that he appeared to have some guppy breathing.  He is currently on 50% FiO2 and 6 of PEEP.  He has been on a low-dose of Levophed since yesterday.  He has been started on dialysis.  No family at bedside.  Patient continues to occasionally spontaneously move his left upper extremity, with little movement elsewhere.  He is unable to follow any commands.    Review of Systems     All pertinent negatives and positives are as above. All other systems have been reviewed and are negative unless otherwise stated.     Objective    Temp:  [97.6 °F (36.4 °C)-99.6 °F (37.6 °C)] 97.6 °F (36.4 °C)  Heart Rate:  [] 90  Resp:  [24-30] 24  BP: ()/(45-68) 87/54  FiO2 (%):  [40 %-50 %] 50 %  Physical Exam  Vitals reviewed.   Constitutional:       General: He is in acute distress.      Appearance: He is ill-appearing.   HENT:      Head: Normocephalic.      Mouth/Throat:      Pharynx: No oropharyngeal exudate.   Eyes:      Pupils: Pupils are equal, round, and reactive to light.   Cardiovascular:      Rate and Rhythm: Normal rate.   Pulmonary:      Effort: Respiratory distress present.      Breath sounds: Rhonchi (scattered) present. No wheezing.      Comments: More work of breathing noted this AM  Abdominal:      Palpations: Abdomen is soft.   Genitourinary:     Comments: Scrotal edema; fecal mgmt system in place  Musculoskeletal:         General: No deformity.      Cervical back: Neck supple.   Skin:     General: Skin is warm.   Neurological:      Comments: Eyes open; will spontaneously move LUE  from time to time; does not follow any commands; no significant change in neuro status since I last saw him > 1 week ago       Results Review:  I have reviewed the labs, radiology results, and diagnostic studies.    Laboratory Data:   Results from last 7 days   Lab Units 08/02/21 0357 08/01/21 0444 07/31/21 0727   WBC 10*3/mm3 22.40* 18.60* 13.75*   HEMOGLOBIN g/dL 7.9* 8.4* 7.7*   HEMATOCRIT % 23.6* 26.1* 23.5*   PLATELETS 10*3/mm3 275 255 158        Results from last 7 days   Lab Units 08/02/21  0357 08/01/21 0444 07/31/21  0727 07/30/21  0306 07/29/21  1215 07/28/21  0355 07/28/21 0355 07/27/21 0357 07/27/21 0357   SODIUM mmol/L 129* 131* 134*   < > 133*   < > 133*   < > 133*   POTASSIUM mmol/L 5.3* 4.7 4.6   < > 4.7   < > 4.7   < > 4.2   CHLORIDE mmol/L 94* 95* 98   < > 99   < > 101   < > 101   CO2 mmol/L 21.0* 23.0 28.0   < > 23.0   < > 19.0*   < > 20.0*   BUN mg/dL 89* 71* 57*   < > 58*   < > 52*   < > 34*   CREATININE mg/dL 5.10* 4.34* 3.57*   < > 4.00*   < > 4.19*   < > 3.43*   CALCIUM mg/dL 8.4* 8.4* 8.2*   < > 8.5*   < > 8.8   < > 8.3*   BILIRUBIN mg/dL  --   --   --   --  0.4  --  0.3  --  0.3   ALK PHOS U/L  --   --   --   --  639*  --  555*  --  350*   ALT (SGPT) U/L  --   --   --   --  12  --  14  --  12   AST (SGOT) U/L  --   --   --   --  17  --  20  --  12   GLUCOSE mg/dL 98 162* 180*   < > 287*   < > 346*   < > 328*    < > = values in this interval not displayed.       Culture Data:   Blood Culture   Date Value Ref Range Status   07/31/2021 No growth at 24 hours  Preliminary   07/31/2021 No growth at 24 hours  Preliminary   07/30/2021 No growth at 2 days  Preliminary   07/30/2021 No growth at 2 days  Preliminary     Urine Culture   Date Value Ref Range Status   07/31/2021 Yeast isolated (A)  Preliminary     Respiratory Culture   Date Value Ref Range Status   07/31/2021 Growth present, too young to evaluate  Preliminary   07/30/2021 Scant growth (1+) Gram Negative Bacilli (A)  Preliminary    07/30/2021 No Normal Respiratory Tawanna (A)  Preliminary       Radiology Data:   Imaging Results (Last 24 Hours)     Procedure Component Value Units Date/Time    XR Chest 1 View [[id number]] Collected: 08/02/21 0726     Updated: 08/02/21 0730    Narrative:      EXAM: XR CHEST 1 VW-     INDICATION: intubated; I46.9-Cardiac arrest, cause unspecified     COMPARISON: 8/1/2021     FINDINGS:     Endotracheal tube is 5 cm above the andre. RIGHT IJ CVL tip overlies  the low SVC. Left-sided CVL tip overlies the upper SVC. Enteric tube  terminates below the diaphragm out of the field of view.     Cardiac silhouette is within normal limits and stable. No change in  dense bilateral interstitial and airspace opacity. No visible  pneumothorax. No large pleural effusion.       Impression:         No change in appearance of the chest.  This report was finalized on 08/02/2021 07:27 by Dr. Jared Cerrato MD.          I have reviewed the patient's current medications.     Assessment/Plan     Active Hospital Problems    Diagnosis    • **Cardiac arrest (CMS/HCC)    • Acute kidney injury (CMS/HCC)    • Acute CVA (cerebrovascular accident) (CMS/HCC)    • Moderate malnutrition (CMS/HCC)    • UTI due to Klebsiella species    • Aspiration pneumonia of left lower lobe due to vomit (MRSA, Klebsiella, and Aspergillus on culture)    • Acute respiratory failure with hypoxia (CMS/HCC)    • Right middle and lower pulmonary embolus (CMS/HCC)    • Closed fracture of rib due to CPR    • Shock, septic (CMS/HCC)    • Lactic acidosis    • Non-traumatic rhabdomyolysis    • Type 2 diabetes mellitus with hyperglycemia, with long-term current use of insulin (CMS/HCC)    • Leukocytosis    • Normocytic anemia      Plan:  1.  CXR reviewed by me this morning - continues to have diffuse bilateral infiltrates (R>L)    2.  Remains on mechanical ventilation at 50% Fi02 and 6 PEEP  3.  Currently on dialysis this morning  4.  Levophed IV gtt for goal MAP 65  5.   Check procalcitonin this AM.  WBC trend noted.  6.  ID recommendations reviewed  7.  Follow-up repeat respiratory culture and blood culture  8.  Tube feeds for nutrition  9.  Continue fluconazole; has completed course of ceftaroline and levofloxacin.  10.  Palliative care following and appreciate their assistance  11.  Patient's sister is seeking emergency guardianship.  Court date occurs today (August 2nd).  Consideration of transition to more of a comfort approach in the near future pending the above.  Documentation from palliative care as follows: 7/28-CODE STATUS changes to no CPR.  Discussed patient's extremely poor overall prognosis and if aggressive measures continue to be pursued would require long-term ventilator support, PEG tube placement, dialysis, and long-term facility placement.  SisterAmy states she has been thinking a lot about his current situation and does not believe her brother would want to live on machines like this.        Electronically signed by Christ Bassett MD, 08/02/21, 09:04 CDT.

## 2021-08-02 NOTE — PLAN OF CARE
"Spoke with patient's sister, Amy, at 1300. She stated she had court at 1:30 pm. Asked whether she has spoken with  regarding cremation funds and she stated she was told she had to wait guardianship is complete. She stated, \"I want to wait until Thursday or Friday before I do anything so nobody gets mad at me. His daughter don't know and I don't want her flipping out at the hospital.\" She then stated that she has not been able to get in contact with her. Appears daughter is still not aware patient is in the hospital. Attempts by hospital staff/cm/palliative to find her have been unsuccessful. The courts are aware he has a daughter as it is on the petition. Asked her to bring a copy of emergency guardianship to the hospital after court if she could. She stated she would try. She stated she never had to do anything like this before and needed time. Asked her to please call palliative care after court and she stated she would.         Sheyla BoyerrDEYSI  8/2/2021      "

## 2021-08-02 NOTE — PROGRESS NOTES
INFECTIOUS DISEASES PROGRESS NOTE    Patient:  Ford High  YOB: 1965  MRN: [id number]   Admit date: 7/16/2021   Admitting Physician: Christ Bassett MD  Primary Care Physician: Denys Yanez Jr., MD    Chief Complaint: Unobtainable from patient on ventilator        Interval History: Patient is about to undergo dialysis.  Per DARRION Andre patient is awaiting for sister to get guardianship and she is going to court today        Bronch specimen positive for AFB here and sent to [facility] has been finalized by [facility] lab as negative is they were not able to find any AFB on the specimen.    PPD was negative-there was not any steroids given per review with DARRION Langley prior to this placement at least since he has been hospitalized  T spot was negative  QuantiFERON gold was indeterminate due to poor mitogen control.  Airborne isolation discontinued July 30          Allergies: No Known Allergies    Current Meds:     Current Facility-Administered Medications:   •  acetaminophen (TYLENOL) tablet 650 mg, 650 mg, Oral, Q4H PRN, 650 mg at 07/30/21 1918 **OR** acetaminophen (TYLENOL) suppository 650 mg, 650 mg, Rectal, Q4H PRN, Jasbir Pickard MD  •  apixaban (ELIQUIS) tablet 5 mg, 5 mg, Oral, Q12H, Jasbir Pickard MD, 5 mg at 08/01/21 2038  •  atorvastatin (LIPITOR) tablet 10 mg, 10 mg, Oral, Nightly, Christ Bassett MD, 10 mg at 08/01/21 2038  •  dexmedetomidine (PRECEDEX) 400 mcg in 100 mL NS infusion, 0.2-1.5 mcg/kg/hr, Intravenous, Titrated, Christ Bassett MD, Held at 07/23/21 0912  •  dextrose (D50W) 25 g/ 50mL Intravenous Solution 25 g, 25 g, Intravenous, Q15 Min PRN, Jasbir Pickard MD, 25 g at 07/17/21 0537  •  dextrose (GLUTOSE) oral gel 15 g, 15 g, Oral, Q15 Min PRN, Jasbir Pickard MD  •  fluconazole (DIFLUCAN) IVPB 100 mg, 100 mg, Intravenous, Daily, Radha Mondragon MD, Last Rate: 50 mL/hr at 08/01/21 1512, 100 mg at 08/01/21 1512  •  glucagon (human  recombinant) (GLUCAGEN DIAGNOSTIC) injection 1 mg, 1 mg, Subcutaneous, Q15 Min PRN, Jasbir Pickard MD  •  guaiFENesin (ROBITUSSIN) 100 MG/5ML oral solution 400 mg, 400 mg, Nasogastric, Q6H, Jasbir Pickard MD, 400 mg at 08/02/21 0210  •  heparin (porcine) injection 3,200 Units, 3,200 Units, Intracatheter, PRN, Chele Kasper MD, 3,200 Units at 07/30/21 1223  •  HYDROmorphone (DILAUDID) injection 0.5 mg, 0.5 mg, Intravenous, Q2H PRN, Jasbir Pickard MD, 0.5 mg at 07/30/21 0248  •  insulin detemir (LEVEMIR) injection 10 Units, 10 Units, Subcutaneous, Q12H, Christ Bassett MD, 10 Units at 08/01/21 2037  •  insulin regular (humuLIN R,novoLIN R) injection 0-9 Units, 0-9 Units, Subcutaneous, Q6H, Jasbir Pickard MD, 4 Units at 08/02/21 0007  •  ipratropium-albuterol (DUO-NEB) nebulizer solution 3 mL, 3 mL, Nebulization, 4x Daily - RT, Jasbir Pickard MD, 3 mL at 08/02/21 0617  •  lidocaine (LIDODERM) 5 % 1 patch, 1 patch, Transdermal, Q24H, Jasbir Pickard MD, 1 patch at 08/01/21 0801  •  LORazepam (ATIVAN) injection 1 mg, 1 mg, Intravenous, Q6H PRN, Jasbir Pickard MD, 1 mg at 07/29/21 1448  •  norepinephrine (LEVOPHED) 8 mg in 250 mL NS infusion (premix), 0.02-0.3 mcg/kg/min, Intravenous, Titrated, Christ Bassett MD, Last Rate: 4.72 mL/hr at 08/02/21 0551, 0.03 mcg/kg/min at 08/02/21 0551  •  ondansetron (ZOFRAN) tablet 4 mg, 4 mg, Oral, Q6H PRN **OR** ondansetron (ZOFRAN) injection 4 mg, 4 mg, Intravenous, Q6H PRN, Jasbir Pickard MD  •  oxyCODONE (ROXICODONE) 5 MG/5ML solution 5 mg, 5 mg, Oral, Q6H PRN, Christ Bassett MD, 5 mg at 08/01/21 0748  •  pantoprazole (PROTONIX) injection 40 mg, 40 mg, Intravenous, Q AM, Jasbir Pickard MD, 40 mg at 08/02/21 0508  •  sennosides-docusate (PERICOLACE) 8.6-50 MG per tablet 1 tablet, 1 tablet, Oral, BID, Jasbir Pickard MD, 1 tablet at 08/01/21 2038  •  sodium chloride 0.9 % bolus 100 mL, 100 mL,  "Intravenous, PRN, Chele Kasper MD  •  sodium chloride 0.9 % flush 10 mL, 10 mL, Intravenous, PRN, Jasbir Pickard MD, 10 mL at 21 0800  •  sodium chloride 0.9 % infusion, 25 mL/hr, Intravenous, Continuous, Brown Whitfield MD, Last Rate: 25 mL/hr at 21 0602, 25 mL/hr at 21 0602      Review of Systems   Unable to perform ROS: Intubated       Objective     Vital Signs:  Temp (24hrs), Av.8 °F (37.1 °C), Min:97.6 °F (36.4 °C), Max:99.6 °F (37.6 °C)      /57   Pulse 90   Temp 97.6 °F (36.4 °C) (Axillary)   Resp 24   Ht 190.5 cm (75\")   Wt 85.3 kg (188 lb 1.6 oz)   SpO2 91%   BMI 23.51 kg/m²         Physical Exam     General: Patient is chronically and acutely ill-appearing lying in bed    HEENT: ET tube in place and connected to ventilator.  OG tube in place.  Respiratory: Patient is using some accessory muscles to breathe today  Neuro:-Still spontaneously moving left upper extremity only.  Extremities: Fairly symmetric edema upper and lower extremities  Psych:  not agitated    Results Review:    I reviewed the patient's new clinical results.    Lab Results:          CBC:   Lab Results   Lab 21  03521  0355 21  1215 21  0307 21  0727 21  0444 21  035   WBC 7.62 9.27 10.40 11.05* 13.75* 18.60* 22.40*   HEMOGLOBIN 6.2* 8.3* 8.2* 8.0* 7.7* 8.4* 7.9*   HEMATOCRIT 18.9* 24.1* 24.0* 23.7* 23.5* 26.1* 23.6*   PLATELETS 114* 99* 93* 96* 158 255 275         CMP:   Lab Results   Lab 21  0357 21  0357 21  0355 21  0355 21  1215 21  0306 21  0727 21  0444 21  0357   SODIUM 133*   < > 133*   < > 133*   < > 134* 131* 129*   POTASSIUM 4.2   < > 4.7   < > 4.7   < > 4.6 4.7 5.3*   CHLORIDE 101   < > 101   < > 99   < > 98 95* 94*   CO2 20.0*   < > 19.0*   < > 23.0   < > 28.0 23.0 21.0*   BUN 34*   < > 52*   < > 58*   < > 57* 71* 89*   CREATININE 3.43*   < > 4.19*   < > 4.00*   < > " 3.57* 4.34* 5.10*   CALCIUM 8.3*   < > 8.8   < > 8.5*   < > 8.2* 8.4* 8.4*   BILIRUBIN 0.3  --  0.3  --  0.4  --   --   --   --    ALK PHOS 350*  --  555*  --  639*  --   --   --   --    ALT (SGPT) 12  --  14  --  12  --   --   --   --    AST (SGOT) 12  --  20  --  17  --   --   --   --    GLUCOSE 328*   < > 346*   < > 287*   < > 180* 162* 98    < > = values in this interval not displayed.       TB Skin Test (Reading)  Order: [id number]  Status:  Final result   Visible to patient:  No (not released) Next appt:  None  Specimen Information: Blood         0 Result Notes  Component   Ref Range & Units 2 d ago   TB Skin Test  Negative    Induration   0 - 10 mm 0    Read Date & Time  7/28/21 1830            TSPOT  Order: [id number]  Status:  Final result   Visible to patient:  No (not released) Next appt:  None  Specimen Information: Blood         0 Result Notes  Component   Ref Range & Units 3 d ago   TSPOTTB   Negative Negative    T-SPOT Panel A  0    T-SPOT Panel B  0    TSPOT NIL CONTROL INTERP  Passed    TSPOT POS CONTROL INTERP  Passed    Resulting Agency [facility]         Specimen Collected: 07/27/21 03:57 Last Resulted: 07/29/21 09:50             Contains abnormal data QuantiFERON TB Plus Client Incubated  Order: [id number]  Status:  Final result   Visible to patient:  No (not released) Next appt:  None  Specimen Information: Blood         0 Result Notes  Component   Ref Range & Units 3 d ago   QuantiFERON Criteria  Comment    Comment: The QuantiFERON-TB Gold Plus result is determined by subtracting   the Nil value from either TB antigen (Ag) tube. The mitogen tube   serves as a control for the test.   QUANTIFERON TB1 AG VALUE   IU/mL 0.00    QUANTIFERON TB2 AG VALUE   IU/mL 0.01    QuantiFERON Nil Value   IU/mL 0.00    QuantiFERON Mitogen Value   IU/mL 0.00    QUANTIFERON-TB GOLD PLUS   Negative IndeterminateAbnormal     Comment: Mitogen (positive control) gave low response. This may occur due to   suboptimal  pre-analytical handling.   The specimen received for QuantiFERON testing was incubated by the   ordering institution. Specific procedures outlined in our Directory   of Services and in the package insert for the QuantiFERON Gold   (In Tube) test must be followed to enable for proper stimulation of   cells for the production of interferon gamma.   Chemiluminescence immunoassay methodology   Resulting Agency [facility]      Narrative  Performed by: [facility]  Performed at:  [address] [facility]  [id number]   : Chirag Webber PhD, Phone:  [phone]      Specimen Collected: 07/27/21 03:57 Last Resulted: 07/30/21 06:09             Culture Results:  Respiratory collected last night greater than 25 WBCs, few epithelial cells, rare gram-negative bacilli    Results for BREE NAYLOR (MRN [id number]) as of 8/1/2021 14:07   Ref. Range 7/31/2021 13:43   Color, UA Latest Ref Range: Yellow, Straw  Yellow   Appearance, UA Latest Ref Range: Clear  Turbid (A)   Specific Gravity, UA Latest Ref Range: >1.005-<1.030  1.025   pH, UA Latest Ref Range: 5.0 - 8.0  8.0   Glucose Latest Ref Range: Negative  100 mg/dL (Trace) (A)   Ketones, UA Latest Ref Range: Negative  Negative   Blood, UA Latest Ref Range: Negative  Large (3+) (A)   Nitrite, UA Latest Ref Range: Negative  Negative   Leukocytes, UA Latest Ref Range: Negative  Large (3+) (A)   Protein, UA Latest Ref Range: Negative  >=300 mg/dL (3+) (A)   Bilirubin, UA Latest Ref Range: Negative  Negative   Urobilinogen, UA Latest Ref Range: 0.2 - 1.0 E.U./dL  0.2 E.U./dL   RBC, UA Latest Ref Range: None Seen /HPF 13-20 (A)   WBC, UA Latest Ref Range: None Seen /HPF Too Numerous to Count (A)   Bacteria, UA Latest Ref Range: None Seen /HPF 4+ (A)   Squamous Epithelial Cells, UA Latest Ref Range: None Seen, 0-2 /HPF 7-12 (A)       urine with yeast      Microbiology Results Abnormal     Procedure Component Value - Date/Time    Blood Culture With VANESSA  - Blood, Arm, Left [[id number]] Collected: 07/30/21 1957    Lab Status: Preliminary result Specimen: Blood from Arm, Left Updated: 08/01/21 2130     Blood Culture No growth at 2 days    Blood Culture With VANESSA - Blood, Hand, Left [[id number]] Collected: 07/30/21 1957    Lab Status: Preliminary result Specimen: Blood from Hand, Left Updated: 08/01/21 2130     Blood Culture No growth at 2 days    Blood Culture With VANESSA - Blood, Arm, Right [[id number]] Collected: 07/31/21 1954    Lab Status: Preliminary result Specimen: Blood from Arm, Right Updated: 08/01/21 2016     Blood Culture No growth at 24 hours    Blood Culture With VANESSA - Blood, Arm, Left [[id number]] Collected: 07/31/21 1953    Lab Status: Preliminary result Specimen: Blood from Arm, Left Updated: 08/01/21 2016     Blood Culture No growth at 24 hours    Respiratory Culture - Sputum, ET Suction [[id number]] Collected: 07/31/21 1945    Lab Status: Preliminary result Specimen: Sputum from ET Suction Updated: 08/01/21 1426     Respiratory Culture Growth present, too young to evaluate     Gram Stain Greater than 25 WBCs per low power field      Few (2+) Epithelial cells per low power field      Rare (1+) Gram positive cocci    Blood Culture - Blood, Blood, Arterial Line [[id number]] Collected: 07/22/21 1142    Lab Status: Final result Specimen: Blood, Arterial Line Updated: 07/27/21 1215     Blood Culture No growth at 5 days    Blood Culture - Blood, Hand, Right [[id number]] Collected: 07/22/21 1006    Lab Status: Final result Specimen: Blood from Hand, Right Updated: 07/27/21 1031     Blood Culture No growth at 5 days    Blood Culture - Blood, Arm, Left [[id number]] Collected: 07/16/21 1115    Lab Status: Final result Specimen: Blood from Arm, Left Updated: 07/21/21 1145     Blood Culture No growth at 5 days    Blood Culture - Blood, Arm, Left [[id number]] Collected: 07/16/21 0957    Lab Status: Final result Specimen: Blood from Arm, Left Updated: 07/21/21 1030     Blood  Culture No growth at 5 days    MRSA Screen, PCR (Inpatient) - Swab, Nares [[id number]]  (Normal) Collected: 07/16/21 1418    Lab Status: Final result Specimen: Swab from Nares Updated: 07/16/21 1558     MRSA PCR No MRSA Detected    S. Pneumo Ag Urine or CSF - Urine, Urine, Clean Catch [[id number]]  (Normal) Collected: 07/16/21 1331    Lab Status: Final result Specimen: Urine, Clean Catch Updated: 07/16/21 1415     Strep Pneumo Ag Negative    Legionella Antigen, Urine - Urine, Urine, Clean Catch [[id number]]  (Normal) Collected: 07/16/21 1331    Lab Status: Final result Specimen: Urine, Clean Catch Updated: 07/16/21 1415     LEGIONELLA ANTIGEN, URINE Negative    COVID-19,[facility] IN-HOUSE,Nasal Swab No Transport Media 3-4 HR TAT - Swab, Nasal Cavity [[id number]]  (Normal) Collected: 07/16/21 1002    Lab Status: Final result Specimen: Swab from Nasal Cavity Updated: 07/16/21 1059     COVID19 Not Detected    Narrative:      Fact sheet for providers: https://www.fda.gov/media/458602/download     Fact sheet for patients: https://www.fda.gov/media/411338/download    Test performed by PCR.    Consider negative results in combination with clinical observations, patient history, and epidemiological information.        AFB Culture - Wash, Bronchus  Order: [id number]  Status:  Preliminary result   Visible to patient:  No (not released) Next appt:  None  Specimen Information: Bronchus; Wash         0 Result Notes  AFB Culture Acid Fast Bacilli isolatedAbnormal        DATE SENT 7/26/21       SENT TO STATE? Sent to [facility] for ID              AFB Stain  No acid fast bacilli seen on direct smear      No acid fast bacilli seen on concentrated smear            Resulting Agency: [facility]         Specimen Collected: 07/16/21 17:01                   Radiology:   Imaging Results (Last 72 Hours)     Procedure Component Value Units Date/Time    XR Chest 1 View [[id number]] Collected: 08/02/21 0726     Updated: 08/02/21 0730    Narrative:       EXAM: XR CHEST 1 VW-     INDICATION: intubated; I46.9-Cardiac arrest, cause unspecified     COMPARISON: 8/1/2021     FINDINGS:     Endotracheal tube is 5 cm above the andre. RIGHT IJ CVL tip overlies  the low SVC. Left-sided CVL tip overlies the upper SVC. Enteric tube  terminates below the diaphragm out of the field of view.     Cardiac silhouette is within normal limits and stable. No change in  dense bilateral interstitial and airspace opacity. No visible  pneumothorax. No large pleural effusion.       Impression:         No change in appearance of the chest.  This report was finalized on 08/02/2021 07:27 by Dr. Jared Cerrato MD.    XR Chest 1 View [[id number]] Collected: 08/01/21 0916     Updated: 08/01/21 0920    Narrative:      Frontal supine radiograph of the chest 8/1/2021 4:09 AM CDT     History: intubated; I46.9-Cardiac arrest, cause unspecified     Comparison: Exam dated 7/31/2021      Findings:      Lines and tubes are stable in position. Bilateral, essentially diffuse  patchy pulmonary infiltrates are unchanged. Overall lung volumes are  stable. No pneumothorax. The cardiomediastinal silhouette and pulmonary  vascularity are unchanged.       No acute osseous or soft tissue abnormality is noted.        Impression:      Impression:   1. No significant interval change.  2. Lung volumes are stable. ET tube is in good position.  3. Bilateral, essentially diffuse pulmonary infiltrates are unchanged.        This report was finalized on 08/01/2021 09:17 by Dr Onofre Stahl, .    XR Chest 1 View [[id number]] Collected: 07/31/21 0931     Updated: 07/31/21 0935    Narrative:      Frontal supine radiograph of the chest 7/31/2021 5:34 AM CDT     History: intubated; I46.9-Cardiac arrest, cause unspecified     Comparison: 7/30/2021      Findings:   Lines and tubes are stable in position. No new opacities or  pneumothoraces are visualized in the chest. The cardiomediastinal  silhouette and pulmonary vascularity are  unchanged.       No acute osseous or soft tissue abnormality is noted.        Impression:      Impression:   1. No significant interval change since previous exam.        This report was finalized on 07/31/2021 09:32 by Dr. Renard Cuevas MD.          Assessment/Plan     Active Hospital Problems    Diagnosis    • **Cardiac arrest (CMS/HCC)    • Acute kidney injury (CMS/HCC)    • Acute CVA (cerebrovascular accident) (CMS/formerly [facility])    • Moderate malnutrition (CMS/formerly [facility])    • UTI due to Klebsiella species    • Aspiration pneumonia of left lower lobe due to vomit (MRSA, Klebsiella, and Aspergillus on culture)    • Acute respiratory failure with hypoxia (CMS/formerly [facility])    • Right middle and lower pulmonary embolus (CMS/formerly [facility])    • Closed fracture of rib due to CPR    • Shock, septic (CMS/formerly [facility])    • Lactic acidosis    • Non-traumatic rhabdomyolysis    • Type 2 diabetes mellitus with hyperglycemia, with long-term current use of insulin (CMS/formerly [facility])    • Leukocytosis    • Normocytic anemia        IMPRESSION:  1. Leukocytosis-increasing  2. AFB growing from bronchoscopy culture-T spot negative.  PPD negative.  QuantiFERON gold indeterminate.  Contact the [facility] lab was not able to find any acid-fast bacilli on specimen that they received  3. Bilateral pulmonary infiltrates-treated as as pneumonia.  Repeat sputum July 31 pending but there is growth present.  There is rare gram-positive cocci on Gram stain.  4. Uncontrolled diabetes mellitus with hemoglobin A1c over 15  5. Yeast in urine-on repeat culture this time via in and out cath  6. Heavy growth of MRSA from respiratory culture from July 16-treated  7. Scant growth of Klebsiella pneumonia from respiratory culture July 16-treated  8. Fever-resolved then recurred July 30-trending down to low-grade with T-max and 99 range  9. Likely hypoxic  brain injury  10. Left temporal lobe stroke  11. Pulmonary embolism  12. Acute kidney injury-on  hemodialysis  13. Thrombocytopenia-resolved      RECOMMENDATION:   · Continue fluconazole  · Await identification and susceptibilities of urine cultures  · Follow-up on respiratory culture  · Continue to monitor blood cultures obtained July 31  · We will resume more broad empiric antibiotic therapy if hypotension, positive culture, etc.      Discussed with DARRION Andre MD  08/02/21  08:03 CDT

## 2021-08-02 NOTE — PROGRESS NOTES
Nephrology ([facility]) Progress Note      Patient:  Ford High  YOB: 1965  Date of Service: 8/2/2021  MRN: [id number]   Acct: [id number]   Primary Care Physician: Denys Ynaez Jr., MD  Advance Directive:   Code Status and Medical Interventions:   Ordered at: 07/28/21 1341     Level Of Support Discussed With:    Next of Kin (If No Surrogate)     Code Status:    No CPR     Medical Interventions (Level of Support Prior to Arrest):    Full     Comments:    sisterAmy     Admit Date: 7/16/2021       Hospital Day: 17  Referring Provider: No Known Provider      Patient personally seen and examined.  Complete chart including Consults, Notes, Operative Reports, Labs, Cardiology, and Radiology studies reviewed as able.        Subjective:  Ford High is a 56 y.o. male  whom we were consulted for acute kidney injury. No prior known renal issues. History of type 2 diabetes that is poorly controlled, chronic diarrhea. Patient was found in distress at home by family. After EMS arrival he went into cardiac arrest. Resuscitated and transported to [facility] ER. Briefly lost pulses again in ER.  CT angiogram in ER revealed large right pulmonary emboli.  Initially was on Levophed drip but this has been weaned off. Creatinine stable for first several days of hospitalization but has trended higher over the last 48 hours (0.85 >1.28 >2.20). urine output has also declined.  No improvement with IV fluids.  Received one unit PRBC on 7/22. Had femoral vascath placed on 7/23 and first hemodialysis was on 7/24. Tolerating HD well    Today remains intubated and sedated.  Levophed drip is back on low-dose. Urine output anuric.    Dialysis   Pt was seen on RRT. Tolerating well  Modality: Hemodialysis  Access: Catheter  Location: right IJ  QB: 400  QD: 600  UF: 3000        Allergies:  Patient has no known allergies.    Home Meds:  Medications Prior to Admission   Medication Sig  Dispense Refill Last Dose   • albuterol (PROVENTIL) (5 MG/ML) 0.5% nebulizer solution Take 2.5 mg by nebulization Every 6 (Six) Hours As Needed for Wheezing.   Unknown at Unknown time   • aspirin 81 MG chewable tablet Chew 81 mg Daily.   Unknown at Unknown time   • atorvastatin (LIPITOR) 20 MG tablet Take 20 mg by mouth Daily.   Unknown at Unknown time   • cefdinir (OMNICEF) 300 MG capsule Take 1 capsule by mouth 2 (Two) Times a Day. 10 capsule 0    • dicyclomine (BENTYL) 20 MG tablet Take 20 mg by mouth 4 (Four) Times a Day As Needed (cramping and diarrhea).   Unknown at Unknown time   • diphenoxylate-atropine (LOMOTIL) 2.5-0.025 MG per tablet Take 1-2 tablets by mouth 3 (Three) Times a Day As Needed for Diarrhea.   Unknown at Unknown time   • fluticasone (FLONASE) 50 MCG/ACT nasal spray 2 sprays into the nostril(s) as directed by provider Daily.   Unknown at Unknown time   • insulin aspart (NOVOLOG FLEXPEN) 100 UNIT/ML solution pen-injector sc pen Inject 5 Units under the skin into the appropriate area as directed 3 (Three) Times a Day With Meals. (Patient taking differently: Inject 10 Units under the skin into the appropriate area as directed 3 (Three) Times a Day With Meals.)   Unknown at Unknown time   • insulin glargine (LANTUS) 100 UNIT/ML injection Inject 25 Units under the skin into the appropriate area as directed Every Night.  12 Unknown at Unknown time   • metoprolol tartrate (LOPRESSOR) 25 MG tablet Take 25 mg by mouth Daily.   Unknown at Unknown time   • midodrine (PROAMATINE) 10 MG tablet Take 10 mg by mouth 2 (Two) Times a Day.   Unknown at Unknown time   • riFAXIMin (XIFAXAN) 550 MG tablet Take 550 mg by mouth Every 8 (Eight) Hours.          Medicines:  Current Facility-Administered Medications   Medication Dose Route Frequency Provider Last Rate Last Admin   • acetaminophen (TYLENOL) tablet 650 mg  650 mg Oral Q4H PRN Jasbir Pickard MD   650 mg at 07/30/21 1918    Or   • acetaminophen  (TYLENOL) suppository 650 mg  650 mg Rectal Q4H PRN Jasbir Pickard MD       • albumin human 25 % IV SOLN 25 g  25 g Intravenous PRN Gaston Grider MD   25 g at 08/02/21 0904   • apixaban (ELIQUIS) tablet 5 mg  5 mg Oral Q12H Jasbir Pickard MD   5 mg at 08/02/21 0854   • atorvastatin (LIPITOR) tablet 10 mg  10 mg Oral Nightly Christ Bassett MD   10 mg at 08/01/21 2038   • dexmedetomidine (PRECEDEX) 400 mcg in 100 mL NS infusion  0.2-1.5 mcg/kg/hr Intravenous Titrated Christ Bassett MD   Held at 07/23/21 0912   • dextrose (D50W) 25 g/ 50mL Intravenous Solution 25 g  25 g Intravenous Q15 Min PRN Jasbir Pickard MD   25 g at 07/17/21 0537   • dextrose (GLUTOSE) oral gel 15 g  15 g Oral Q15 Min PRN Jasbir Pickard MD       • fluconazole (DIFLUCAN) IVPB 100 mg  100 mg Intravenous Daily Radha Mondragon MD 50 mL/hr at 08/02/21 0855 100 mg at 08/02/21 0855   • glucagon (human recombinant) (GLUCAGEN DIAGNOSTIC) injection 1 mg  1 mg Subcutaneous Q15 Min PRN Jasbir Pickard MD       • guaiFENesin (ROBITUSSIN) 100 MG/5ML oral solution 400 mg  400 mg Nasogastric Q6H Jasbir Pickard MD   400 mg at 08/02/21 0854   • heparin (porcine) injection 3,200 Units  3,200 Units Intracatheter PRN Chele Kasper MD   3,200 Units at 07/30/21 1223   • HYDROmorphone (DILAUDID) injection 0.5 mg  0.5 mg Intravenous Q2H PRN Jasbir Pickard MD   0.5 mg at 08/02/21 0845   • insulin detemir (LEVEMIR) injection 10 Units  10 Units Subcutaneous Q12H Christ Bassett MD   10 Units at 08/01/21 2037   • insulin regular (humuLIN R,novoLIN R) injection 0-9 Units  0-9 Units Subcutaneous Q6H Jasbir Pickard MD   4 Units at 08/02/21 0007   • ipratropium-albuterol (DUO-NEB) nebulizer solution 3 mL  3 mL Nebulization 4x Daily - RT Jasbir Pickard MD   3 mL at 08/02/21 1006   • lidocaine (LIDODERM) 5 % 1 patch  1 patch Transdermal Q24H Jasbir Pickard MD   1 patch at 08/02/21 0854   •  LORazepam (ATIVAN) injection 1 mg  1 mg Intravenous Q6H PRN Jasbir Pickard MD   1 mg at 08/02/21 0922   • norepinephrine (LEVOPHED) 8 mg in 250 mL NS infusion (premix)  0.02-0.3 mcg/kg/min Intravenous Titrated Christ Bassett MD 4.72 mL/hr at 08/02/21 0939 0.03 mcg/kg/min at 08/02/21 0939   • ondansetron (ZOFRAN) tablet 4 mg  4 mg Oral Q6H PRN Jasbir Pickard MD        Or   • ondansetron (ZOFRAN) injection 4 mg  4 mg Intravenous Q6H PRN Jasbir Pickard MD       • oxyCODONE (ROXICODONE) 5 MG/5ML solution 5 mg  5 mg Oral Q6H PRN Christ Bassett MD   5 mg at 08/01/21 0748   • pantoprazole (PROTONIX) injection 40 mg  40 mg Intravenous Q AM Jasbir Pickard MD   40 mg at 08/02/21 0508   • sennosides-docusate (PERICOLACE) 8.6-50 MG per tablet 1 tablet  1 tablet Oral BID Jasbir Pickard MD   1 tablet at 08/02/21 0854   • sodium chloride 0.9 % bolus 100 mL  100 mL Intravenous PRN Chele Kasper MD       • sodium chloride 0.9 % bolus 100 mL  100 mL Intravenous PRN Gaston Grider MD       • sodium chloride 0.9 % flush 10 mL  10 mL Intravenous PRN Jasbir Pickard MD   10 mL at 08/01/21 0800   • sodium chloride 0.9 % infusion  25 mL/hr Intravenous Continuous Brown Whitfield MD 25 mL/hr at 07/26/21 0602 25 mL/hr at 07/26/21 0602       Past Medical History:  Past Medical History:   Diagnosis Date   • Autonomic neuropathy    • Chronic diarrhea    • Diabetes mellitus (CMS/HCC)    • Diabetic foot ulcer (CMS/HCC)    • Elevated cholesterol    • Lateral epicondylitis, right elbow    • Noncompliance    • Orthostatic hypotension    • Skin infection        Past Surgical History:  Past Surgical History:   Procedure Laterality Date   • TONSILLECTOMY         Family History  Family History   Problem Relation Age of Onset   • Diabetes Mother    • Arthritis Mother    • Hyperlipidemia Mother    • Heart disease Father    • Diabetes Father    • Arthritis Father        Social History  Social  History     Socioeconomic History   • Marital status:      Spouse name: Not on file   • Number of children: Not on file   • Years of education: Not on file   • Highest education level: Not on file   Tobacco Use   • Smoking status: Never Smoker   Substance and Sexual Activity   • Alcohol use: No   • Drug use: No         Review of Systems:  Unable to obtain due to intubated and sedated    Objective:  Patient Vitals for the past 24 hrs:   BP Temp Temp src Pulse Resp SpO2 Weight   08/02/21 1015 114/63 -- -- 87 -- 94 % --   08/02/21 1014 -- -- -- 88 -- -- --   08/02/21 1006 -- -- -- 86 24 94 % --   08/02/21 1000 116/65 -- -- 87 -- 95 % --   08/02/21 0945 119/65 -- -- 83 -- 94 % --   08/02/21 0930 128/73 -- -- 87 -- 95 % --   08/02/21 0915 118/62 -- -- 90 -- 93 % --   08/02/21 0900 (!) 89/53 -- -- 87 -- 91 % --   08/02/21 0856 (!) 87/54 -- -- -- -- -- --   08/02/21 0845 92/57 -- -- 89 -- 91 % --   08/02/21 0830 94/52 -- -- 89 -- 91 % --   08/02/21 0815 102/64 -- -- 91 -- 92 % --   08/02/21 0800 102/65 -- -- 91 -- 91 % --   08/02/21 0745 100/56 -- -- 91 -- 92 % --   08/02/21 0730 108/56 -- -- 91 -- 91 % --   08/02/21 0715 106/57 -- -- 92 -- 93 % --   08/02/21 0700 103/57 -- -- 90 -- 91 % --   08/02/21 0645 97/56 -- -- 90 -- 92 % --   08/02/21 0630 98/52 -- -- 92 -- 92 % --   08/02/21 0626 -- -- -- 91 -- -- --   08/02/21 0617 -- -- -- 91 24 95 % --   08/02/21 0615 98/55 -- -- 91 -- 93 % --   08/02/21 0600 97/60 -- -- 90 -- 92 % --   08/02/21 0545 104/54 -- -- 91 -- 91 % --   08/02/21 0530 107/61 -- -- 91 -- 92 % --   08/02/21 0515 91/57 -- -- 89 -- 92 % --   08/02/21 0500 100/65 -- -- 92 -- 91 % --   08/02/21 0445 99/54 -- -- 92 -- 90 % --   08/02/21 0430 108/55 -- -- 93 -- 91 % --   08/02/21 0415 107/55 -- -- 92 -- 91 % --   08/02/21 0400 96/54 97.6 °F (36.4 °C) Axillary 92 -- 90 % 85.3 kg (188 lb 1.6 oz)   08/02/21 0345 107/59 -- -- 93 -- 91 % --   08/02/21 0343 -- -- -- 93 26 90 % --   08/02/21 0330 111/68 -- --  93 -- 93 % --   08/02/21 0315 104/65 -- -- 93 -- 93 % --   08/02/21 0300 100/57 -- -- 95 -- 91 % --   08/02/21 0245 -- -- -- 95 -- 92 % --   08/02/21 0230 111/55 -- -- 95 -- 93 % --   08/02/21 0215 101/61 -- -- 95 -- 94 % --   08/02/21 0200 113/52 -- -- 96 -- 93 % --   08/02/21 0145 109/54 -- -- 96 -- 93 % --   08/02/21 0130 104/51 -- -- 96 -- 93 % --   08/02/21 0115 111/58 -- -- 96 -- 93 % --   08/02/21 0100 113/62 -- -- 95 -- 94 % --   08/02/21 0045 114/64 -- -- 96 -- 92 % --   08/02/21 0030 110/55 -- -- 96 -- 91 % --   08/02/21 0015 91/45 -- -- 94 -- 91 % --   08/02/21 0000 97/47 99.6 °F (37.6 °C) Axillary 94 -- 93 % --   08/01/21 2345 101/53 -- -- 95 -- 93 % --   08/01/21 2330 103/50 -- -- 95 -- 92 % --   08/01/21 2315 99/53 -- -- 94 -- 92 % --   08/01/21 2306 -- -- -- 96 (!) 30 92 % --   08/01/21 2300 101/51 -- -- 95 -- 93 % --   08/01/21 2245 100/50 -- -- 96 -- 91 % --   08/01/21 2230 96/53 -- -- 97 -- 92 % --   08/01/21 2215 100/51 -- -- 98 -- 91 % --   08/01/21 2200 105/52 -- -- 98 -- 91 % --   08/01/21 2145 104/54 -- -- 100 -- 91 % --   08/01/21 2130 102/51 -- -- 99 -- 91 % --   08/01/21 2115 98/49 -- -- 98 -- -- --   08/01/21 2100 104/49 -- -- 97 -- -- --   08/01/21 2045 104/48 -- -- 97 -- -- --   08/01/21 2030 100/48 -- -- 96 -- 90 % --   08/01/21 2015 96/46 -- -- 95 -- 91 % --   08/01/21 2000 106/60 -- -- 95 -- 91 % --   08/01/21 1945 114/58 99.5 °F (37.5 °C) Axillary 97 -- 99 % --   08/01/21 1940 -- -- -- 96 -- 95 % --   08/01/21 1930 103/53 -- -- 94 (!) 29 92 % --   08/01/21 1915 109/49 -- -- 93 -- 93 % --   08/01/21 1900 106/51 -- -- 93 -- 94 % --   08/01/21 1815 -- -- -- 94 -- 95 % --   08/01/21 1800 104/52 -- -- 93 -- 95 % --   08/01/21 1745 102/51 -- -- 92 -- 96 % --   08/01/21 1730 97/59 -- -- 93 -- 95 % --   08/01/21 1715 99/46 -- -- 94 -- 95 % --   08/01/21 1700 (!) 87/46 98.9 °F (37.2 °C) Axillary 91 -- 95 % --   08/01/21 1645 98/45 -- -- 91 -- 95 % --   08/01/21 1630 98/52 -- -- 90 -- 96 % --    08/01/21 1615 105/53 -- -- 89 -- 96 % --   08/01/21 1600 96/52 -- -- 88 -- 97 % --   08/01/21 1515 105/54 -- -- 89 -- 96 % --   08/01/21 1500 105/60 -- -- 89 -- 95 % --   08/01/21 1445 104/62 -- -- 89 -- 96 % --   08/01/21 1432 -- -- -- 89 -- -- --   08/01/21 1430 103/67 -- -- 88 -- 100 % --   08/01/21 1424 -- -- -- 88 24 99 % --   08/01/21 1415 103/56 -- -- 88 -- 96 % --   08/01/21 1400 108/59 -- -- 88 -- 97 % --   08/01/21 1200 104/62 98.6 °F (37 °C) Axillary 88 -- 96 % --   08/01/21 1145 111/63 -- -- 88 -- 96 % --   08/01/21 1130 104/59 -- -- 88 -- 96 % --   08/01/21 1115 95/61 -- -- 89 -- 95 % --   08/01/21 1108 -- -- -- 88 -- -- --   08/01/21 1100 107/61 -- -- 87 -- 95 % --   08/01/21 1059 -- -- -- 90 24 97 % --   08/01/21 1045 113/61 -- -- 89 -- 96 % --   08/01/21 1030 111/62 -- -- 89 -- 96 % --       Intake/Output Summary (Last 24 hours) at 8/2/2021 1022  Last data filed at 8/2/2021 0914  Gross per 24 hour   Intake 2214.48 ml   Output 300 ml   Net 1914.48 ml     General: intubated   Neck: supple, no JVD  Chest:  bilateral coarse  CVS: regular rate and rhythm  Abdominal: soft, nontender, positive bowel sounds  Extremities: trace edema  Skin: warm and dry without rash      Labs:  Results from last 7 days   Lab Units 08/02/21  0357 08/01/21  0444 07/31/21 0727   WBC 10*3/mm3 22.40* 18.60* 13.75*   HEMOGLOBIN g/dL 7.9* 8.4* 7.7*   HEMATOCRIT % 23.6* 26.1* 23.5*   PLATELETS 10*3/mm3 275 255 158         Results from last 7 days   Lab Units 08/02/21  0357 08/01/21  0444 07/31/21  0727 07/30/21  0306 07/29/21  1215 07/28/21  0355 07/28/21  0355 07/27/21  0357 07/27/21  0357   SODIUM mmol/L 129* 131* 134*   < > 133*   < > 133*   < > 133*   POTASSIUM mmol/L 5.3* 4.7 4.6   < > 4.7   < > 4.7   < > 4.2   CHLORIDE mmol/L 94* 95* 98   < > 99   < > 101   < > 101   CO2 mmol/L 21.0* 23.0 28.0   < > 23.0   < > 19.0*   < > 20.0*   BUN mg/dL 89* 71* 57*   < > 58*   < > 52*   < > 34*   CREATININE mg/dL 5.10* 4.34* 3.57*   < >  4.00*   < > 4.19*   < > 3.43*   CALCIUM mg/dL 8.4* 8.4* 8.2*   < > 8.5*   < > 8.8   < > 8.3*   BILIRUBIN mg/dL  --   --   --   --  0.4  --  0.3  --  0.3   ALK PHOS U/L  --   --   --   --  639*  --  555*  --  350*   ALT (SGPT) U/L  --   --   --   --  12  --  14  --  12   AST (SGOT) U/L  --   --   --   --  17  --  20  --  12   GLUCOSE mg/dL 98 162* 180*   < > 287*   < > 346*   < > 328*    < > = values in this interval not displayed.       Radiology:   Imaging Results (Last 72 Hours)     Procedure Component Value Units Date/Time    XR Chest 1 View [[id number]] Collected: 08/02/21 0726     Updated: 08/02/21 0730    Narrative:      EXAM: XR CHEST 1 VW-     INDICATION: intubated; I46.9-Cardiac arrest, cause unspecified     COMPARISON: 8/1/2021     FINDINGS:     Endotracheal tube is 5 cm above the andre. RIGHT IJ CVL tip overlies  the low SVC. Left-sided CVL tip overlies the upper SVC. Enteric tube  terminates below the diaphragm out of the field of view.     Cardiac silhouette is within normal limits and stable. No change in  dense bilateral interstitial and airspace opacity. No visible  pneumothorax. No large pleural effusion.       Impression:         No change in appearance of the chest.  This report was finalized on 08/02/2021 07:27 by Dr. Jared Cerrato MD.    XR Chest 1 View [[id number]] Collected: 08/01/21 0916     Updated: 08/01/21 0920    Narrative:      Frontal supine radiograph of the chest 8/1/2021 4:09 AM CDT     History: intubated; I46.9-Cardiac arrest, cause unspecified     Comparison: Exam dated 7/31/2021      Findings:      Lines and tubes are stable in position. Bilateral, essentially diffuse  patchy pulmonary infiltrates are unchanged. Overall lung volumes are  stable. No pneumothorax. The cardiomediastinal silhouette and pulmonary  vascularity are unchanged.       No acute osseous or soft tissue abnormality is noted.        Impression:      Impression:   1. No significant interval change.  2. Lung  volumes are stable. ET tube is in good position.  3. Bilateral, essentially diffuse pulmonary infiltrates are unchanged.        This report was finalized on 08/01/2021 09:17 by Dr Onofre Stahl, .    XR Chest 1 View [[id number]] Collected: 07/31/21 0931     Updated: 07/31/21 0935    Narrative:      Frontal supine radiograph of the chest 7/31/2021 5:34 AM CDT     History: intubated; I46.9-Cardiac arrest, cause unspecified     Comparison: 7/30/2021      Findings:   Lines and tubes are stable in position. No new opacities or  pneumothoraces are visualized in the chest. The cardiomediastinal  silhouette and pulmonary vascularity are unchanged.       No acute osseous or soft tissue abnormality is noted.        Impression:      Impression:   1. No significant interval change since previous exam.        This report was finalized on 07/31/2021 09:32 by Dr. Renard Cuevas MD.          Culture:  Blood Culture   Date Value Ref Range Status   07/16/2021 No growth at 5 days  Final   07/16/2021 No growth at 5 days  Final     Urine Culture   Date Value Ref Range Status   07/16/2021 (A)  Final    >100,000 CFU/mL Klebsiella pneumoniae ssp pneumoniae     Respiratory Culture   Date Value Ref Range Status   07/16/2021 Light growth (2+) Staphylococcus aureus, MRSA (A)  Final     Comment:     Methicillin resistant Staphylococcus aureus, Patient may be an isolation risk.   07/16/2021 Scant growth (1+) Aspergillus species (A)  Final   07/16/2021 (A)  Final    Scant growth (1+) Klebsiella pneumoniae ssp pneumoniae   07/16/2021 Scant growth (1+) Normal Skin Tawanna  Final   07/16/2021 Heavy growth (4+) Staphylococcus aureus, MRSA (A)  Final     Comment:     Methicillin resistant Staphylococcus aureus, Patient may be an isolation risk.   07/16/2021 Light growth (2+) Normal Respiratory Tawanna  Final         Assessment   1.  Acute kidney injury---ATN vs vancomycin toxicity--now on HD  2.  Acute hypoxic respiratory failure--intubated  3.  S/p  cardiac arrest on 7/16  4.  Left lower lobe aspiration pneumonia  5.  Right middle and lower lobe pulmonary embolus  6.  Septic shock  7.  Type 2 diabetes  8.  Anemia--s/p PRBC on 7/22  9.  Acute left temporal infarct on MRI  10  Metabolic encephalopathy  11.  Hyponatremia  12.  Hyperkalemia     Plan:  1.  Dialysis today  2.  Monitor labs  3.  Wean levophed as able  4.  Family is seeking emergency guardianship, with intent to de-escalate care due to grim prognosis.      Yehuda Dc, APRN  8/2/2021  10:22 CDT

## 2021-08-02 NOTE — CASE MANAGEMENT/SOCIAL WORK
Continued Stay Note  [facility]     Patient Name: Ford High  MRN: [id number]  Today's Date: 8/2/2021    Admit Date: 7/16/2021    Discharge Plan     Row Name 08/02/21 1022       Plan    Plan  unclear    Plan Comments  Awaiting plan of care.        Discharge Codes    No documentation.             VALENTIN Hussein

## 2021-08-02 NOTE — PROGRESS NOTES
PULMONARY AND CRITICAL CARE PROGRESS NOTE - [facility]    Patient: Ford High    1965    MR# [id number]    Acct# [id number]  08/02/21   10:39 CDT  Referring Provider: Christ Bassett MD    Chief Complaint: Mechanically ventilated    Interval history: Neurological status unchanged.  He remains on Levophed for blood pressure support.  Currently getting dialysis.    He has SCDs in place.  He is receiving nutrition via tube feeds.  Lots of secretions reported.  No cough or gag reflex reported.  Meds:  apixaban, 5 mg, Oral, Q12H  atorvastatin, 10 mg, Oral, Nightly  fluconazole, 100 mg, Intravenous, Daily  guaiFENesin, 400 mg, Nasogastric, Q6H  insulin detemir, 10 Units, Subcutaneous, Q12H  insulin regular, 0-9 Units, Subcutaneous, Q6H  ipratropium-albuterol, 3 mL, Nebulization, 4x Daily - RT  lidocaine, 1 patch, Transdermal, Q24H  pantoprazole, 40 mg, Intravenous, Q AM  senna-docusate sodium, 1 tablet, Oral, BID      dexmedetomidine, 0.2-1.5 mcg/kg/hr, Last Rate: Stopped (07/23/21 0912)  norepinephrine, 0.02-0.3 mcg/kg/min, Last Rate: 0.03 mcg/kg/min (08/02/21 0939)  sodium chloride, 25 mL/hr, Last Rate: 25 mL/hr (07/26/21 0602)      Review of Systems:     Cannot obtain due to mechanical ventilated state     Ventilator Settings:        Resp Rate (Set): 24  Pressure Support (cm H2O): 10 cm H20  FiO2 (%): 50 %  PEEP/CPAP (cm H2O): 6 cm H20  Minute Ventilation (L/min) (Obs): 15.6 L/min  Resp Rate (Observed) Vent: 36  I:E Ratio (Set): 1:2.1  I:E Ratio (Obs): 3.20:1  PIP Observed (cm H2O): 61 cm H2O     Physical Exam:  Temp:  [97.6 °F (36.4 °C)-99.6 °F (37.6 °C)] 97.8 °F (36.6 °C)  Heart Rate:  [] 87  Resp:  [24-30] 24  BP: ()/(45-73) 114/63  FiO2 (%):  [40 %-50 %] 50 %    Intake/Output Summary (Last 24 hours) at 8/2/2021 1039  Last data filed at 8/2/2021 0914  Gross per 24 hour   Intake 2214.48 ml   Output 300 ml   Net 1914.48 ml     SpO2 Percentage    08/02/21 1000 08/02/21  1006 08/02/21 1015   SpO2: 95% 94% 94%      Physical Exam  Constitutional:       Appearance: He is cachectic. He is ill-appearing. He is not toxic-appearing.      Interventions: He is intubated.   HENT:      Mouth/Throat:      Comments: OG intact  Neck:      Thyroid: No thyromegaly.      Vascular: No JVD.   Cardiovascular:      Rate and Rhythm: Normal rate and regular rhythm.      Comments: Rate 88  Pulmonary:      Effort: He is intubated.   Musculoskeletal:      Right lower leg: Edema present.      Left lower leg: Edema present.   Skin:     Coloration: Skin is pale.   Neurological:      Mental Status: He is unresponsive.   Psychiatric:         Behavior: Behavior is not agitated.       Results from last 7 days   Lab Units 08/02/21  0357 08/01/21  0444 07/31/21  0727   WBC 10*3/mm3 22.40* 18.60* 13.75*   HEMOGLOBIN g/dL 7.9* 8.4* 7.7*   PLATELETS 10*3/mm3 275 255 158     Results from last 7 days   Lab Units 08/02/21  0357 08/01/21  0444 07/31/21  0727   SODIUM mmol/L 129* 131* 134*   POTASSIUM mmol/L 5.3* 4.7 4.6   BUN mg/dL 89* 71* 57*   CREATININE mg/dL 5.10* 4.34* 3.57*     Results from last 7 days   Lab Units 08/02/21  0535 08/01/21  0510 07/31/21  0435   PH, ARTERIAL pH units 7.283* 7.437 7.483*   PCO2, ARTERIAL mm Hg 52.0* 37.8 38.6   PO2 ART mm Hg 93.5 66.0* 73.2*   FIO2 % 50 40 40     Blood Culture   Date Value Ref Range Status   07/16/2021 No growth at 24 hours  Preliminary     Culture results from last 30 days   Lab 07/16/21  1701   AFBCX Acid Fast Bacilli isolated*   FUNGUSCX Light growth (2+) Candida albicans*      Recent films:  XR Chest 1 View    Result Date: 8/2/2021   No change in appearance of the chest. This report was finalized on 08/02/2021 07:27 by Dr. Jared Cerrato MD.    XR Chest 1 View    Result Date: 8/1/2021  Impression: 1. No significant interval change. 2. Lung volumes are stable. ET tube is in good position. 3. Bilateral, essentially diffuse pulmonary infiltrates are unchanged.   This  report was finalized on 08/01/2021 09:17 by Dr Onofre Stahl, .    Films reviewed personally by me.  My interpretation: ETT high. Persistent interstitial lung infiltrates, worse on the right, LLL atelectasis. 7-30-21     Pulmonary Assessment:    1. Acute respiratory failure with hypoxia, multifactorial  2. Mechanical ventilation dependence   3. Status post aspiration episode  4. Acute renal failure, requiring dialysis   5. Right-sided pulmonary embolism  6. Status post cardiac arrest likely due to respiratory issue, with anoxic encephalopathy  7. Anemia requiring intermittent prbc transfusions   8. Septic shock, improved, now hypotensive possibly related to fluid depletion    Recommend:     · ETT D#19.  Continue mechanical ventilation. Current settings: AC rate 24, tv 600, peep 6, fio2 0.4.  His mental status with concerns of anoxia limits trial of spontaneous breathing trial and further weaning from the vent.  Also has lots of secretions.  · Not ready for ventilator liberation efforts due to poor mental status  · Await AFB result from state, quantiferon indeterminate   · Chest x-ray and ABG daily while on the ventilator  · DVT prophylaxis - Eliquis  · Stress ulcer prophylaxis, Protonix   · Antibiotic: teflaro complete, levofloxacin D#5 and voriconazole complete, ID managing appreciate their assistance  · Continue ipratropium and albuterol    · Continue guaifenesin   · Continue to monitor h&h, transfuse as appropriate   · Continue enteral nutrition   · Prognosis seems poor with multiorgan failure. Possibly comfort care once guardianship established  · Significant concern for anoxia.    Electronically signed by Patricia Willson MD on 8/2/2021 at 10:39 CDT

## 2021-08-03 NOTE — CASE MANAGEMENT/SOCIAL WORK
Continued Stay Note   [place]     Patient Name: Ford High  MRN: [id number]  Today's Date: 8/3/2021    Admit Date: 7/16/2021    Discharge Plan     Row Name 08/03/21 1105       Plan    Plan  Comfort Measures    Plan Comments  Patient is now comfort measures.  SW will follow and assist as needed.        Discharge Codes    No documentation.             VALENTIN Hussein

## 2021-08-03 NOTE — PROGRESS NOTES
Palliative Care Daily Progress Note   support for patient/family    Code Status:   Code Status and Medical Interventions:   Ordered at: 08/03/21 0844     Code Status:    No CPR     Medical Interventions (Level of Support Prior to Arrest):    Comfort Measures      Advanced Directives: Advance Directive Status: Patient does not have advance directive   Goals of Care: Ongoing.     S: Medical record reviewed. Events noted.  Remains intubated on ventilator support.  He remains unresponsive.  SisterAmy was able to acquire guardianship through the courts yesterday.  A copy has been provided to this facility and scanned to EMR.  Advance care planning-  Spoke with sister/guardian this morning and she has stated that she wished to pursue transition with de-escalation measures to comfort this morning.  She has not been able to reach patient's daughter/her niece after multiple attempts.  Expectations discussed at length.  States that she does not wish to be present for palliative extubation process, but may visit later if patient is still alive.      Review of Systems   Unable to perform ROS: intubated     Pain Assessment  CPOT and PAINAD Scales: CPOT (Critical-Care Pain Observation Tool)  CPOT Facial Expression: 0-->relaxed, neutral  CPOT Body Movements: 0-->absence of movements  CPOT Muscle Tension: 0-->relaxed  Ventilator Compliance/Vocalization: 0-->tolerating ventilator or movement  CPOT Score: 0    O:     Intake/Output Summary (Last 24 hours) at 8/3/2021 0857  Last data filed at 8/3/2021 0400  Gross per 24 hour   Intake 1839.75 ml   Output 100 ml   Net 1739.75 ml       Diagnostics: Reviewed    Current Facility-Administered Medications   Medication Dose Route Frequency Provider Last Rate Last Admin   • acetaminophen (TYLENOL) tablet 650 mg  650 mg Oral Q4H PRN Mariela Ocampo APRN        Or   • acetaminophen (TYLENOL) 160 MG/5ML solution 650 mg  650 mg Oral Q4H PRN Mariela Ocampo APRN        Or   •  acetaminophen (TYLENOL) suppository 650 mg  650 mg Rectal Q4H PRN Mariela Ocampo APRN       • artificial tears ophthalmic ointment   Both Eyes Q1H PRN Don Guerrero MD       • atropine 1 % ophthalmic solution 2 drop  2 drop Sublingual BID PRN Mariela Ocampo APRN       • diphenoxylate-atropine (LOMOTIL) 2.5-0.025 MG per tablet 1 tablet  1 tablet Oral Q2H PRN Mariela Ocampo APRN       • furosemide (LASIX) injection 20 mg  20 mg Intravenous Q6H PRN Mariela Ocampo APRN       • Glycerin-Hypromellose- (ARTIFICIAL TEARS) 0.2-0.2-1 % ophthalmic solution solution 1 drop  1 drop Both Eyes Q30 Min PRN Mariela Ocampo APRN       • glycopyrrolate (ROBINUL) injection 0.2 mg  0.2 mg Intravenous Q2H PRN Mariela Ocampo APRN        Or   • glycopyrrolate (ROBINUL) injection 0.2 mg  0.2 mg Subcutaneous Q2H PRN Mariela Ocampo APRN        Or   • glycopyrrolate (ROBINUL) injection 0.4 mg  0.4 mg Intravenous Q2H PRN Mariela Ocampo APRN        Or   • glycopyrrolate (ROBINUL) injection 0.4 mg  0.4 mg Subcutaneous Q2H PRN Mariela Ocampo APRN       • haloperidol (HALDOL) 2 MG/ML solution 2 mg  2 mg Sublingual Q4H PRN Mariela Ocampo APRN        Or   • haloperidol lactate (HALDOL) injection 2 mg  2 mg Intravenous Q4H PRN Mariela Ocampo APRN       • LORazepam (ATIVAN) injection 1 mg  1 mg Intravenous Q1H PRN Mariela Ocampo APRN        Or   • LORazepam (ATIVAN) 2 MG/ML concentrated solution 1 mg  1 mg Sublingual Q1H PRN Mariela Ocampo APRN       • LORazepam (ATIVAN) injection 2 mg  2 mg Intravenous Q1H PRN Mariela Ocampo APRN        Or   • LORazepam (ATIVAN) 2 MG/ML concentrated solution 2 mg  2 mg Sublingual Q1H PRN Mariela Ocampo APRN       • LORazepam (ATIVAN) injection 1 mg  1 mg Intravenous Q10 Min PRN Mariela Ocampo APRN       • LORazepam (ATIVAN) injection 1 mg  1 mg Intravenous Q6H Mariela Ocampo APRN       • morphine injection  "4 mg  4 mg Intravenous Q1H PRN Mariela Ocampo APRN        Or   • morphine concentrated solution solution 10 mg  10 mg Sublingual Q1H PRN Mariela Ocampo APRSAYRA       • morphine injection 4 mg  4 mg Intravenous Q10 Min PRN Mariela Ocampo APRSAYRA       • prochlorperazine (COMPAZINE) injection 5 mg  5 mg Intravenous Q6H PRN Mariela Ocampo APRN        Or   • prochlorperazine (COMPAZINE) tablet 5 mg  5 mg Oral Q6H PRN Mariela Ocampo APRSAYRA        Or   • prochlorperazine (COMPAZINE) suppository 25 mg  25 mg Rectal Q12H PRN Mariela Ocampo APRN       • scopolamine patch 1 mg/72 hr  1 patch Transdermal Q72H PRN Mariela Ocampo APRN       • sodium chloride 0.9 % flush 10 mL  10 mL Intravenous PRN Jasbir Pickard MD   10 mL at 08/01/21 0800        •  acetaminophen **OR** acetaminophen **OR** acetaminophen  •  artificial tears  •  atropine  •  diphenoxylate-atropine  •  furosemide  •  Glycerin-Hypromellose-  •  glycopyrrolate **OR** glycopyrrolate **OR** glycopyrrolate **OR** glycopyrrolate  •  haloperidol **OR** haloperidol lactate  •  LORazepam **OR** LORazepam  •  LORazepam **OR** LORazepam  •  LORazepam  •  Morphine **OR** morphine  •  Morphine  •  prochlorperazine **OR** prochlorperazine **OR** prochlorperazine  •  Scopolamine  •  sodium chloride    A:    /53   Pulse 99   Temp (!) 100.9 °F (38.3 °C) (Axillary)   Resp (!) 29   Ht 190.5 cm (75\")   Wt 84.2 kg (185 lb 11.2 oz)   SpO2 90%   BMI 23.21 kg/m²     Vitals and nursing note reviewed.   Constitutional:       Appearance: Ill-appearing and acutely ill-appearing.      Interventions: Intubated.   Eyes:      Comments; open  HENT:      Head: Normocephalic.    Mouth/Throat:      Comments: OG  Neck:      Comments: Left IJ  Pulmonary:      Effort: Intubated.   Cardiovascular:      Normal rate present.   Abdominal:      Comments: Tolerating tube feeds   Musculoskeletal:      Cervical back: Neck supple.   Neurological:      " Mental Status: Unresponsive.   Psychiatric:         Cognition and Memory: Cognition is impaired.         Patient status: Disease state: Deteriorating despite treatments.  Functional status: Palliative Performance Scale Score: Performance 10% based on the following measures: Ambulation: Totally bed bound, Activity and Evidence of Disease: Unable to do any work, extensive evidence of disease, Self-Care: Total care required,  Intake: Mouth care only, LOC: Drowsy or comatose   Nutritional status: Albumin 2.20. Body mass index is 23.31 kg/m².      Family support: The patient lacks significant family support..  Advance Directives: Advance Directive Status: Patient does not have advance directive   POA/Healthcare surrogate-Amy thomas listed at next of kin...Patient has a daughter, Brooke High who apparently is estranged per patient's sisterAmy. *.     Impression/Problem List:     1.  Cardiac arrest  2.  Aspiration pneumonia of left lower lobe due to vomit  3.  Acute respiratory failure with hypoxia  4.  Pulmonary embolus of right middle and lower lobes  5.  Closed fracture of rib/sternum due to CPR  6.  Shock, septic  7.   Acute left temporal infarct, per MRI  8.  Medical noncompliance  9.  UTI, Klebsiella  10.  Type 2 diabetes mellitus  11.  MRSA and Aspergillus, sputum  12.  Anemia  13.  Metabolic encephalopathy  14.  Acute kidney injury     Recommendations/Plan:  1. plan: Goals of care include CODE STATUS No CPR/comfort measures.     2.  Palliative care encounter  -Sister/guardian has elected to transition with de-escalation measures with focus of comfort today.  -Emergency guardianship document obtained by patient's sister and scanned to EMR.      3.  Comfort care  -Discontinue any treatments and adjuvants not in line with comfort  -Palliative extubation  -Will utilize morphine and Ativan for symptom management pre and post extubation.  -Oxygen as needed for comfort  -Elder catheter for  comfort  -Palliative admission order set adjuvants as needed.  Discussed with nursing.      Thank you for allowing us to participate in patient's plan of care. Palliative Care Team will continue to follow patient.     Time spent: 58 minutes spent reviewing medical and medication records, assessing and examining patient, discussing with family, answering questions, providing some guidance about a plan and documentation of care, and coordinating care with other healthcare members, with > 50% time spent face to face.  18 minutes spent on advance care planning     Mariela Ocampo, APRN  8/3/2021

## 2021-08-03 NOTE — PROGRESS NOTES
Nephrology ([facility]) Progress Note      Patient:  Ford High  YOB: 1965  Date of Service: 8/3/2021  MRN: [id number]   Acct: [id number]   Primary Care Physician: Denys Yanez Jr., MD  Advance Directive:   Code Status and Medical Interventions:   Ordered at: 08/03/21 0844     Code Status:    No CPR     Medical Interventions (Level of Support Prior to Arrest):    Comfort Measures     Admit Date: 7/16/2021       Hospital Day: 18  Referring Provider: No Known Provider      Patient personally seen and examined.  Complete chart including Consults, Notes, Operative Reports, Labs, Cardiology, and Radiology studies reviewed as able.        Subjective:  Ford High is a 56 y.o. male  whom we were consulted for acute kidney injury. No prior known renal issues. History of type 2 diabetes that is poorly controlled, chronic diarrhea. Patient was found in distress at home by family. After EMS arrival he went into cardiac arrest. Resuscitated and transported to [facility] ER. Briefly lost pulses again in ER.  CT angiogram in ER revealed large right pulmonary emboli.  Initially was on Levophed drip but this has been weaned off. Creatinine stable for first several days of hospitalization but has trended higher over the last 48 hours (0.85 >1.28 >2.20). urine output has also declined.  No improvement with IV fluids.  Received one unit PRBC on 7/22. Had femoral vascath placed on 7/23 and first hemodialysis was on 7/24. Tolerating HD well    Today patient is being terminally extubated. Family has secured guardianship and have decided to transition to comfort care/withdrawl of aggressive measures      Allergies:  Patient has no known allergies.    Home Meds:  Medications Prior to Admission   Medication Sig Dispense Refill Last Dose   • albuterol (PROVENTIL) (5 MG/ML) 0.5% nebulizer solution Take 2.5 mg by nebulization Every 6 (Six) Hours As Needed for Wheezing.   Unknown at  Unknown time   • aspirin 81 MG chewable tablet Chew 81 mg Daily.   Unknown at Unknown time   • atorvastatin (LIPITOR) 20 MG tablet Take 20 mg by mouth Daily.   Unknown at Unknown time   • cefdinir (OMNICEF) 300 MG capsule Take 1 capsule by mouth 2 (Two) Times a Day. 10 capsule 0    • dicyclomine (BENTYL) 20 MG tablet Take 20 mg by mouth 4 (Four) Times a Day As Needed (cramping and diarrhea).   Unknown at Unknown time   • diphenoxylate-atropine (LOMOTIL) 2.5-0.025 MG per tablet Take 1-2 tablets by mouth 3 (Three) Times a Day As Needed for Diarrhea.   Unknown at Unknown time   • fluticasone (FLONASE) 50 MCG/ACT nasal spray 2 sprays into the nostril(s) as directed by provider Daily.   Unknown at Unknown time   • insulin aspart (NOVOLOG FLEXPEN) 100 UNIT/ML solution pen-injector sc pen Inject 5 Units under the skin into the appropriate area as directed 3 (Three) Times a Day With Meals. (Patient taking differently: Inject 10 Units under the skin into the appropriate area as directed 3 (Three) Times a Day With Meals.)   Unknown at Unknown time   • insulin glargine (LANTUS) 100 UNIT/ML injection Inject 25 Units under the skin into the appropriate area as directed Every Night.  12 Unknown at Unknown time   • metoprolol tartrate (LOPRESSOR) 25 MG tablet Take 25 mg by mouth Daily.   Unknown at Unknown time   • midodrine (PROAMATINE) 10 MG tablet Take 10 mg by mouth 2 (Two) Times a Day.   Unknown at Unknown time   • riFAXIMin (XIFAXAN) 550 MG tablet Take 550 mg by mouth Every 8 (Eight) Hours.          Medicines:  Current Facility-Administered Medications   Medication Dose Route Frequency Provider Last Rate Last Admin   • acetaminophen (TYLENOL) tablet 650 mg  650 mg Oral Q4H PRN Mariela Ocampo APRSAYRA        Or   • acetaminophen (TYLENOL) 160 MG/5ML solution 650 mg  650 mg Oral Q4H PRN Mariela Ocampo APRN        Or   • acetaminophen (TYLENOL) suppository 650 mg  650 mg Rectal Q4H PRN Mariela Ocampo APRN       •  artificial tears ophthalmic ointment   Both Eyes Q1H PRN Don Guerrero MD       • atropine 1 % ophthalmic solution 2 drop  2 drop Sublingual BID PRN Mariela Ocampo APRN       • diphenoxylate-atropine (LOMOTIL) 2.5-0.025 MG per tablet 1 tablet  1 tablet Oral Q2H PRN Mariela Ocampo APRN       • furosemide (LASIX) injection 20 mg  20 mg Intravenous Q6H PRN Mariela Ocampo APRN       • Glycerin-Hypromellose- (ARTIFICIAL TEARS) 0.2-0.2-1 % ophthalmic solution solution 1 drop  1 drop Both Eyes Q30 Min PRN Mariela Ocampo APRN       • glycopyrrolate (ROBINUL) injection 0.2 mg  0.2 mg Intravenous Q2H PRN Mariela Ocampo APRN        Or   • glycopyrrolate (ROBINUL) injection 0.2 mg  0.2 mg Subcutaneous Q2H PRN Mariela Ocampo APRN        Or   • glycopyrrolate (ROBINUL) injection 0.4 mg  0.4 mg Intravenous Q2H PRN Mariela Ocampo APRN        Or   • glycopyrrolate (ROBINUL) injection 0.4 mg  0.4 mg Subcutaneous Q2H PRN Mariela Ocampo APRN       • glycopyrrolate (ROBINUL) injection 0.2 mg  0.2 mg Intravenous 4x Daily Mariela Ocampo APRN       • haloperidol (HALDOL) 2 MG/ML solution 2 mg  2 mg Sublingual Q4H PRN Mariela Ocampo APRN        Or   • haloperidol lactate (HALDOL) injection 2 mg  2 mg Intravenous Q4H PRN Mariela Ocampo APRN       • LORazepam (ATIVAN) injection 1 mg  1 mg Intravenous Q1H PRN Mariela Ocampo APRN        Or   • LORazepam (ATIVAN) 2 MG/ML concentrated solution 1 mg  1 mg Sublingual Q1H PRN Mariela Ocampo APRN       • LORazepam (ATIVAN) injection 2 mg  2 mg Intravenous Q1H PRN Mariela Ocampo APRN   2 mg at 08/03/21 0858    Or   • LORazepam (ATIVAN) 2 MG/ML concentrated solution 2 mg  2 mg Sublingual Q1H PRN Mariela Ocampo APRN       • LORazepam (ATIVAN) injection 1 mg  1 mg Intravenous Q10 Min PRN Mariela Ocampo APRN   1 mg at 08/03/21 0932   • LORazepam (ATIVAN) injection 1 mg  1 mg Intravenous Q6H  Mariela Ocampo, APRN       • morphine injection 4 mg  4 mg Intravenous Q1H PRN Mariela Ocampo APRN   4 mg at 08/03/21 0930    Or   • morphine concentrated solution solution 10 mg  10 mg Sublingual Q1H PRN Mariela Ocampo L, APRN       • morphine injection 4 mg  4 mg Intravenous Q10 Min PRN Mariela Ocampo, APRN       • prochlorperazine (COMPAZINE) injection 5 mg  5 mg Intravenous Q6H PRN Mariela Ocampo, APRN        Or   • prochlorperazine (COMPAZINE) tablet 5 mg  5 mg Oral Q6H PRN Mariela Ocampo, APRN        Or   • prochlorperazine (COMPAZINE) suppository 25 mg  25 mg Rectal Q12H PRN Mariela Ocampo, APRN       • scopolamine patch 1 mg/72 hr  1 patch Transdermal Q72H PRN Mariela Ocampo, APRN       • sodium chloride 0.9 % flush 10 mL  10 mL Intravenous PRN Jasbir Pickard MD   10 mL at 08/01/21 0800       Past Medical History:  Past Medical History:   Diagnosis Date   • Autonomic neuropathy    • Chronic diarrhea    • Diabetes mellitus (CMS/HCC)    • Diabetic foot ulcer (CMS/HCC)    • Elevated cholesterol    • Lateral epicondylitis, right elbow    • Noncompliance    • Orthostatic hypotension    • Skin infection        Past Surgical History:  Past Surgical History:   Procedure Laterality Date   • TONSILLECTOMY         Family History  Family History   Problem Relation Age of Onset   • Diabetes Mother    • Arthritis Mother    • Hyperlipidemia Mother    • Heart disease Father    • Diabetes Father    • Arthritis Father        Social History  Social History     Socioeconomic History   • Marital status:      Spouse name: Not on file   • Number of children: Not on file   • Years of education: Not on file   • Highest education level: Not on file   Tobacco Use   • Smoking status: Never Smoker   Substance and Sexual Activity   • Alcohol use: No   • Drug use: No         Review of Systems:  Unable to obtain due to intubated and sedated    Objective:  Patient Vitals for the past 24  hrs:   BP Temp Temp src Pulse Resp SpO2 Weight   08/03/21 0926 -- -- -- 91 -- 93 % --   08/03/21 0715 108/53 -- -- 99 -- 90 % --   08/03/21 0708 -- -- -- 95 (!) 29 97 % --   08/03/21 0702 -- -- -- 97 27 92 % --   08/03/21 0700 147/67 -- -- 98 -- 90 % --   08/03/21 0645 91/46 -- -- 92 -- 92 % --   08/03/21 0630 115/55 -- -- 95 -- 95 % --   08/03/21 0615 114/52 -- -- 98 -- 95 % --   08/03/21 0600 116/54 -- -- 97 -- 94 % --   08/03/21 0545 106/54 -- -- 97 -- 94 % --   08/03/21 0530 112/54 -- -- 97 -- 95 % --   08/03/21 0515 121/59 -- -- 97 -- 96 % --   08/03/21 0500 116/60 -- -- 98 -- 96 % --   08/03/21 0445 115/54 -- -- 99 -- 95 % --   08/03/21 0430 110/49 -- -- 97 -- 95 % --   08/03/21 0415 111/47 -- -- 99 -- 94 % --   08/03/21 0400 104/43 (!) 100.9 °F (38.3 °C) Axillary 96 -- 94 % 84.2 kg (185 lb 11.2 oz)   08/03/21 0345 113/53 -- -- 95 -- 95 % --   08/03/21 0340 -- -- -- 97 (!) 31 94 % --   08/03/21 0330 116/53 -- -- 96 -- 95 % --   08/03/21 0315 107/50 -- -- 94 -- 94 % --   08/03/21 0300 124/55 -- -- 96 -- 95 % --   08/03/21 0245 119/53 -- -- 96 -- 96 % --   08/03/21 0230 130/59 -- -- 98 -- 96 % --   08/03/21 0215 123/54 -- -- 102 -- 93 % --   08/03/21 0200 102/45 -- -- 95 -- 94 % --   08/03/21 0145 109/48 -- -- 95 -- 95 % --   08/03/21 0130 114/53 -- -- 93 -- 95 % --   08/03/21 0115 112/54 -- -- 95 -- 95 % --   08/03/21 0100 106/52 -- -- 96 -- 94 % --   08/03/21 0045 110/49 -- -- 101 -- 94 % --   08/03/21 0030 112/52 -- -- 101 -- 94 % --   08/03/21 0015 111/55 -- -- 102 -- 94 % --   08/03/21 0000 118/53 -- -- 102 -- 94 % --   08/02/21 2345 117/50 -- -- 102 -- 94 % --   08/02/21 2340 -- -- -- 103 (!) 30 94 % --   08/02/21 2330 101/55 99 °F (37.2 °C) Axillary 103 -- 94 % --   08/02/21 2315 -- -- -- 101 -- 95 % --   08/02/21 2300 110/53 -- -- 98 -- 94 % --   08/02/21 2245 110/52 -- -- 98 -- 95 % --   08/02/21 2230 103/51 -- -- 98 -- 94 % --   08/02/21 2215 109/53 -- -- 97 -- 94 % --   08/02/21 2200 115/55 -- -- 101  -- 95 % --   08/02/21 2145 110/52 -- -- 101 -- 94 % --   08/02/21 2130 108/55 -- -- 100 -- 94 % --   08/02/21 2115 93/50 -- -- 98 -- 93 % --   08/02/21 2100 98/50 -- -- 98 -- 92 % --   08/02/21 2045 95/46 -- -- 94 -- 92 % --   08/02/21 2030 105/50 -- -- 96 -- 93 % --   08/02/21 2015 107/52 -- -- 95 -- 93 % --   08/02/21 2000 107/51 -- -- 96 -- 93 % --   08/02/21 1945 107/50 -- -- 96 -- 93 % --   08/02/21 1930 104/53 98.3 °F (36.8 °C) Axillary 96 -- 93 % --   08/02/21 1922 -- -- -- 98 (!) 30 95 % --   08/02/21 1917 -- -- -- 96 26 93 % --   08/02/21 1915 107/56 -- -- 97 -- 93 % --   08/02/21 1900 106/54 -- -- 98 -- 92 % --   08/02/21 1839 99/47 -- -- -- -- -- --   08/02/21 1815 106/54 -- -- 90 -- 92 % --   08/02/21 1800 (!) 88/51 -- -- 88 -- 91 % --   08/02/21 1745 102/49 -- -- 90 -- 92 % --   08/02/21 1730 107/52 -- -- 91 -- 93 % --   08/02/21 1715 101/63 -- -- 90 -- 92 % --   08/02/21 1700 100/47 -- -- 89 -- 90 % --   08/02/21 1645 100/52 -- -- 88 -- 90 % --   08/02/21 1630 108/57 -- -- 89 -- 91 % --   08/02/21 1615 110/53 -- -- 88 -- 91 % --   08/02/21 1600 97/78 -- -- 90 -- 92 % --   08/02/21 1545 108/52 97.4 °F (36.3 °C) Axillary 88 -- 95 % --   08/02/21 1536 (!) 89/54 -- -- 91 -- 93 % --   08/02/21 1530 (!) 89/54 -- -- 87 -- 94 % --   08/02/21 1515 94/53 -- -- 89 -- 93 % --   08/02/21 1500 97/57 -- -- 89 25 93 % --   08/02/21 1445 100/49 -- -- 89 -- (!) 89 % --   08/02/21 1430 106/52 -- -- 88 -- 90 % --   08/02/21 1415 93/56 -- -- 87 -- -- --   08/02/21 1410 -- -- -- 87 -- -- --   08/02/21 1402 -- -- -- 84 26 94 % --   08/02/21 1400 98/53 -- -- 85 -- 90 % --   08/02/21 1345 104/55 -- -- 84 -- 91 % --   08/02/21 1330 99/53 -- -- 85 -- 91 % --   08/02/21 1315 103/52 -- -- 86 -- 90 % --   08/02/21 1300 105/55 -- -- 86 (!) 32 92 % --   08/02/21 1245 102/57 -- -- 86 -- 92 % --   08/02/21 1230 115/55 -- -- 89 -- 91 % --   08/02/21 1215 112/53 -- -- 88 -- 93 % --   08/02/21 1210 -- 97.6 °F (36.4 °C) Axillary -- -- --  --   08/02/21 1200 113/56 -- -- 90 (!) 36 93 % --   08/02/21 1145 102/51 -- -- 88 -- 90 % --   08/02/21 1130 103/54 -- -- 89 -- 91 % --   08/02/21 1115 98/55 -- -- 87 -- 90 % --   08/02/21 1100 103/51 -- -- 88 (!) 33 90 % --   08/02/21 1045 112/58 -- -- 88 -- 91 % --   08/02/21 1030 107/52 -- -- 87 -- 90 % --   08/02/21 1015 114/63 -- -- 87 -- 94 % --   08/02/21 1014 -- -- -- 88 -- -- --   08/02/21 1006 -- -- -- 86 24 94 % --   08/02/21 1000 116/65 -- -- 87 (!) 34 95 % --   08/02/21 0945 119/65 -- -- 83 -- 94 % --       Intake/Output Summary (Last 24 hours) at 8/3/2021 0944  Last data filed at 8/3/2021 0400  Gross per 24 hour   Intake 1338.13 ml   Output 100 ml   Net 1238.13 ml     General: intubated   Neck: supple, no JVD  Chest:  bilateral coarse  CVS: regular rate and rhythm  Abdominal: soft, nontender, positive bowel sounds  Extremities: trace edema  Skin: warm and dry without rash      Labs:  Results from last 7 days   Lab Units 08/03/21  0324 08/02/21  0357 08/01/21  0444   WBC 10*3/mm3 19.21* 22.40* 18.60*   HEMOGLOBIN g/dL 6.7* 7.9* 8.4*   HEMATOCRIT % 21.2* 23.6* 26.1*   PLATELETS 10*3/mm3 358 275 255         Results from last 7 days   Lab Units 08/03/21  0324 08/02/21  0357 08/01/21  0444 07/30/21  0306 07/29/21  1215 07/28/21  0355 07/28/21  0355   SODIUM mmol/L 132* 129* 131*   < > 133*   < > 133*   POTASSIUM mmol/L 4.4 5.3* 4.7   < > 4.7   < > 4.7   CHLORIDE mmol/L 91* 94* 95*   < > 99   < > 101   CO2 mmol/L 26.0 21.0* 23.0   < > 23.0   < > 19.0*   BUN mg/dL 64* 89* 71*   < > 58*   < > 52*   CREATININE mg/dL 3.58* 5.10* 4.34*   < > 4.00*   < > 4.19*   CALCIUM mg/dL 8.3* 8.4* 8.4*   < > 8.5*   < > 8.8   BILIRUBIN mg/dL 0.3  --   --   --  0.4  --  0.3   ALK PHOS U/L 656*  --   --   --  639*  --  555*   ALT (SGPT) U/L 11  --   --   --  12  --  14   AST (SGOT) U/L 24  --   --   --  17  --  20   GLUCOSE mg/dL 250* 98 162*   < > 287*   < > 346*    < > = values in this interval not displayed.       Radiology:    Imaging Results (Last 72 Hours)     Procedure Component Value Units Date/Time    XR Chest 1 View [[id number]] Collected: 08/03/21 0719     Updated: 08/03/21 0723    Narrative:      EXAM: XR CHEST 1 VW-     INDICATION: intubated; I46.9-Cardiac arrest, cause unspecified     COMPARISON: Prior     FINDINGS:     Endotracheal tube is 5 cm above the andre. Bilateral CVL's appear  stable in position. Enteric tube terminates below the diaphragm.     Cardiac silhouette is stable. No change in extensive bilateral  interstitial and airspace opacity. No large pleural effusion or visible  pneumothorax.       Impression:         No change in appearance of the chest.  This report was finalized on 08/03/2021 07:20 by Dr. Jared Cerrato MD.    XR Chest 1 View [[id number]] Collected: 08/02/21 0726     Updated: 08/02/21 0730    Narrative:      EXAM: XR CHEST 1 VW-     INDICATION: intubated; I46.9-Cardiac arrest, cause unspecified     COMPARISON: 8/1/2021     FINDINGS:     Endotracheal tube is 5 cm above the andre. RIGHT IJ CVL tip overlies  the low SVC. Left-sided CVL tip overlies the upper SVC. Enteric tube  terminates below the diaphragm out of the field of view.     Cardiac silhouette is within normal limits and stable. No change in  dense bilateral interstitial and airspace opacity. No visible  pneumothorax. No large pleural effusion.       Impression:         No change in appearance of the chest.  This report was finalized on 08/02/2021 07:27 by Dr. Jared Cerrato MD.    XR Chest 1 View [[id number]] Collected: 08/01/21 0916     Updated: 08/01/21 0920    Narrative:      Frontal supine radiograph of the chest 8/1/2021 4:09 AM CDT     History: intubated; I46.9-Cardiac arrest, cause unspecified     Comparison: Exam dated 7/31/2021      Findings:      Lines and tubes are stable in position. Bilateral, essentially diffuse  patchy pulmonary infiltrates are unchanged. Overall lung volumes are  stable. No pneumothorax. The  cardiomediastinal silhouette and pulmonary  vascularity are unchanged.       No acute osseous or soft tissue abnormality is noted.        Impression:      Impression:   1. No significant interval change.  2. Lung volumes are stable. ET tube is in good position.  3. Bilateral, essentially diffuse pulmonary infiltrates are unchanged.        This report was finalized on 08/01/2021 09:17 by Dr Onofre Stahl, .          Culture:  Blood Culture   Date Value Ref Range Status   07/16/2021 No growth at 5 days  Final   07/16/2021 No growth at 5 days  Final     Urine Culture   Date Value Ref Range Status   07/16/2021 (A)  Final    >100,000 CFU/mL Klebsiella pneumoniae ssp pneumoniae     Respiratory Culture   Date Value Ref Range Status   07/16/2021 Light growth (2+) Staphylococcus aureus, MRSA (A)  Final     Comment:     Methicillin resistant Staphylococcus aureus, Patient may be an isolation risk.   07/16/2021 Scant growth (1+) Aspergillus species (A)  Final   07/16/2021 (A)  Final    Scant growth (1+) Klebsiella pneumoniae ssp pneumoniae   07/16/2021 Scant growth (1+) Normal Skin Tawanna  Final   07/16/2021 Heavy growth (4+) Staphylococcus aureus, MRSA (A)  Final     Comment:     Methicillin resistant Staphylococcus aureus, Patient may be an isolation risk.   07/16/2021 Light growth (2+) Normal Respiratory Tawanna  Final         Assessment   1.  Acute kidney injury---ATN vs vancomycin toxicity--now on HD  2.  Acute hypoxic respiratory failure--intubated  3.  S/p cardiac arrest on 7/16  4.  Left lower lobe aspiration pneumonia  5.  Right middle and lower lobe pulmonary embolus  6.  Septic shock  7.  Type 2 diabetes  8.  Anemia--s/p PRBC on 7/22  9.  Acute left temporal infarct on MRI  10  Metabolic encephalopathy  11.  Hyponatremia  12.  Hyperkalemia     Plan:  1.  No further dialysis   2.  Aggressive measures being withdrawn today  3.  Renal service will sign off        Yehuda Dc, DENISHA  8/3/2021  09:44 CDT

## 2021-08-03 NOTE — PLAN OF CARE
Problem: Device-Related Complication Risk (Enteral Nutrition)  Goal: Safe, Effective Therapy Delivery  Outcome: Unable to Meet, Plan Revised    Pt extubated. Tube feeding discontinued due to pt now comfort measures/withdrawal of aggressive measures.

## 2021-08-03 NOTE — SIGNIFICANT NOTE
Pt was terminally extubated at 0926 and no heart tones were auscultated at 0934. Heart tones were auscultated by myself and Reno Barber RN.

## 2021-08-03 NOTE — PLAN OF CARE
Goal Outcome Evaluation:                 Problem: Restraint, Nonbehavioral (Nonviolent)  Goal: Discontinuation Criteria Achieved  Outcome: Unable to Meet, Plan Revised  Goal: Personal Dignity and Safety Maintained  Outcome: Unable to Meet, Plan Revised     Problem: Skin Injury Risk Increased  Goal: Skin Health and Integrity  Outcome: Unable to Meet, Plan Revised     Problem: Fall Injury Risk  Goal: Absence of Fall and Fall-Related Injury  Outcome: Unable to Meet, Plan Revised     Problem: Adult Inpatient Plan of Care  Goal: Patient-Specific Goal (Individualized)  Outcome: Unable to Meet, Plan Revised  Goal: Absence of Hospital-Acquired Illness or Injury  Outcome: Unable to Meet, Plan Revised  Goal: Optimal Comfort and Wellbeing  Outcome: Unable to Meet, Plan Revised  Goal: Readiness for Transition of Care  Outcome: Unable to Meet, Plan Revised     Problem: Feeding Intolerance (Enteral Nutrition)  Goal: Feeding Tolerance  Outcome: Unable to Meet, Plan Revised     Problem: Adjustment to Illness (Stroke, Ischemic/Transient Ischemic Attack)  Goal: Optimal Coping  Outcome: Unable to Meet, Plan Revised     Problem: Bowel Elimination Impaired (Stroke, Ischemic/Transient Ischemic Attack)  Goal: Effective Bowel Elimination  Outcome: Unable to Meet, Plan Revised     Problem: Cerebral Tissue Perfusion Risk (Stroke, Ischemic/Transient Ischemic Attack)  Goal: Optimal Cerebral Tissue Perfusion  Outcome: Unable to Meet, Plan Revised     Problem: Communication Impairment (Stroke, Ischemic/Transient Ischemic Attack)  Goal: Improved Communication Skills  Outcome: Unable to Meet, Plan Revised     Problem: Eating/Swallowing Impairment (Stroke, Ischemic/Transient Ischemic Attack)  Goal: Oral Intake without Aspiration  Outcome: Unable to Meet, Plan Revised     Problem: Functional Ability Impaired (Stroke, Ischemic/Transient Ischemic Attack)  Goal: Optimal Functional Ability  Outcome: Unable to Meet, Plan Revised     Problem: Hemodynamic  Instability (Stroke, Ischemic/Transient Ischemic Attack)  Goal: Vital Signs Remain in Desired Range  Outcome: Unable to Meet, Plan Revised     Problem: Pain (Stroke, Ischemic/Transient Ischemic Attack)  Goal: Acceptable Pain Control  Outcome: Unable to Meet, Plan Revised     Problem: Sensorimotor Impairment (Stroke, Ischemic/Transient Ischemic Attack)  Goal: Improved Sensorimotor Function  Outcome: Unable to Meet, Plan Revised     Problem: Urinary Elimination Impaired (Stroke, Ischemic/Transient Ischemic Attack)  Goal: Effective Urinary Elimination  Outcome: Unable to Meet, Plan Revised     Problem: Fluid Imbalance (Pneumonia)  Goal: Fluid Balance  Outcome: Unable to Meet, Plan Revised     Problem: Infection (Pneumonia)  Goal: Resolution of Infection Signs and Symptoms  Outcome: Unable to Meet, Plan Revised     Problem: Respiratory Compromise (Pneumonia)  Goal: Effective Oxygenation and Ventilation  Outcome: Unable to Meet, Plan Revised     Problem: Communication Impairment (Mechanical Ventilation, Invasive)  Goal: Effective Communication  Outcome: Unable to Meet, Plan Revised     Problem: Device-Related Complication Risk (Mechanical Ventilation, Invasive)  Goal: Optimal Device Function  Outcome: Unable to Meet, Plan Revised     Problem: Inability to Wean (Mechanical Ventilation, Invasive)  Goal: Mechanical Ventilation Liberation  Outcome: Unable to Meet, Plan Revised     Problem: Nutrition Impairment (Mechanical Ventilation, Invasive)  Goal: Optimal Nutrition Delivery  Outcome: Unable to Meet, Plan Revised     Problem: Skin and Tissue Injury (Mechanical Ventilation, Invasive)  Goal: Absence of Device-Related Skin and Tissue Injury  Outcome: Unable to Meet, Plan Revised     Problem: Ventilator-Induced Lung Injury (Mechanical Ventilation, Invasive)  Goal: Absence of Ventilator-Induced Lung Injury  Outcome: Unable to Meet, Plan Revised     Problem: Venous Thromboembolism  Goal: VTE Symptom Resolution  Outcome: Unable  to Meet, Plan Revised     Problem: Electrolyte Imbalance (Acute Kidney Injury/Impairment)  Goal: Serum Electrolyte Balance  Outcome: Unable to Meet, Plan Revised     Problem: Fluid Imbalance (Acute Kidney Injury/Impairment)  Goal: Optimal Fluid Balance  Outcome: Unable to Meet, Plan Revised     Problem: Hematologic Alteration (Acute Kidney Injury/Impairment)  Goal: Hemoglobin, Hematocrit and Platelets Within Normal Range  Outcome: Unable to Meet, Plan Revised     Problem: Oral Intake Inadequate (Acute Kidney Injury/Impairment)  Goal: Optimal Nutrition Intake  Outcome: Unable to Meet, Plan Revised     Problem: Renal Function Impairment (Acute Kidney Injury/Impairment)  Goal: Effective Renal Function  Outcome: Unable to Meet, Plan Revised

## 2021-08-03 NOTE — PROGRESS NOTES
INFECTIOUS DISEASES PROGRESS NOTE    Patient:  Ford High  YOB: 1965  MRN: [id number]   Admit date: 7/16/2021   Admitting Physician: Christ Bassett MD  Primary Care Physician: Denys Yanez Jr., MD    Chief Complaint: Unobtainable from patient on ventilator        Interval History:   Patient continues to need norepinephrine - 0.02-0.04 mcg/kg/min.  Per DARRION Andre patient sister obtained legal guardianship and has had discussions about potentially withdrawing care.  There are reportedly issues of finances to pay his bills and pay for cremation        Bronch specimen positive for AFB here and sent to [facility] has been finalized by [facility] lab as negative is they were not able to find any AFB on the specimen.    PPD was negative-there was not any steroids given per review with DARRION Langley prior to this placement at least since he has been hospitalized  T spot was negative  QuantiFERON gold was indeterminate due to poor mitogen control.  Airborne isolation discontinued July 30          Allergies: No Known Allergies    Current Meds:     Current Facility-Administered Medications:   •  acetaminophen (TYLENOL) tablet 650 mg, 650 mg, Oral, Q4H PRN, 650 mg at 07/30/21 1918 **OR** acetaminophen (TYLENOL) suppository 650 mg, 650 mg, Rectal, Q4H PRN, Jasbir Pickard MD  •  albumin human 25 % IV SOLN 25 g, 25 g, Intravenous, PRN, Gaston Grider MD, 25 g at 08/02/21 0904  •  apixaban (ELIQUIS) tablet 5 mg, 5 mg, Oral, Q12H, Jasbir Pickard MD, 5 mg at 08/02/21 2010  •  artificial tears ophthalmic ointment, , Both Eyes, Q1H PRN, Don Guerrero MD  •  atorvastatin (LIPITOR) tablet 10 mg, 10 mg, Oral, Nightly, Christ Bassett MD, 10 mg at 08/02/21 2010  •  dexmedetomidine (PRECEDEX) 400 mcg in 100 mL NS infusion, 0.2-1.5 mcg/kg/hr, Intravenous, Titrated, Christ Bassett MD, Held at 07/23/21 0912  •  dextrose (D50W) 25 g/ 50mL Intravenous Solution 25 g, 25 g, Intravenous, Q15 Min PRN,  Jasbir Pickard MD, 25 g at 07/17/21 0537  •  dextrose (GLUTOSE) oral gel 15 g, 15 g, Oral, Q15 Min PRN, Jasbir Pickard MD  •  fluconazole (DIFLUCAN) IVPB 100 mg, 100 mg, Intravenous, Daily, Radha Mondragon MD, Last Rate: 50 mL/hr at 08/02/21 0855, 100 mg at 08/02/21 0855  •  glucagon (human recombinant) (GLUCAGEN DIAGNOSTIC) injection 1 mg, 1 mg, Subcutaneous, Q15 Min PRN, Jasbir Pickard MD  •  guaiFENesin (ROBITUSSIN) 100 MG/5ML oral solution 400 mg, 400 mg, Nasogastric, Q6H, Jasbir Pickard MD, 400 mg at 08/03/21 0219  •  heparin (porcine) injection 3,200 Units, 3,200 Units, Intracatheter, PRN, Chele Kasper MD, 3,200 Units at 08/02/21 1145  •  HYDROmorphone (DILAUDID) injection 0.5 mg, 0.5 mg, Intravenous, Q2H PRN, Jasbir Pickard MD, 0.5 mg at 08/03/21 0319  •  insulin detemir (LEVEMIR) injection 10 Units, 10 Units, Subcutaneous, Q12H, Christ Bassett MD, 10 Units at 08/02/21 2021  •  insulin regular (humuLIN R,novoLIN R) injection 0-9 Units, 0-9 Units, Subcutaneous, Q6H, Jasbir Pickard MD, 6 Units at 08/03/21 0530  •  ipratropium-albuterol (DUO-NEB) nebulizer solution 3 mL, 3 mL, Nebulization, 4x Daily - RT, Jasbir Pickard MD, 3 mL at 08/03/21 0702  •  lidocaine (LIDODERM) 5 % 1 patch, 1 patch, Transdermal, Q24H, Jasbir Pickard MD, 1 patch at 08/02/21 0854  •  LORazepam (ATIVAN) injection 1 mg, 1 mg, Intravenous, Q6H PRN, Jasbir Pickard MD, 1 mg at 08/02/21 2207  •  norepinephrine (LEVOPHED) 8 mg in 250 mL NS infusion (premix), 0.02-0.3 mcg/kg/min, Intravenous, Titrated, Christ Bassett MD, Last Rate: 4.72 mL/hr at 08/03/21 0657, 0.03 mcg/kg/min at 08/03/21 0657  •  ondansetron (ZOFRAN) tablet 4 mg, 4 mg, Oral, Q6H PRN **OR** ondansetron (ZOFRAN) injection 4 mg, 4 mg, Intravenous, Q6H PRN, Jasbir Pickard MD  •  oxyCODONE (ROXICODONE) 5 MG/5ML solution 5 mg, 5 mg, Oral, Q6H PRN, Christ Bassett MD, 5 mg at 08/02/21 2127  •   "pantoprazole (PROTONIX) injection 40 mg, 40 mg, Intravenous, Q AM, Jasbir Pickard MD, 40 mg at 21 0530  •  sennosides-docusate (PERICOLACE) 8.6-50 MG per tablet 1 tablet, 1 tablet, Oral, BID, Jasbir Pickard MD, 1 tablet at 21  •  sodium chloride 0.9 % bolus 100 mL, 100 mL, Intravenous, PRN, Chele Kasper MD  •  sodium chloride 0.9 % bolus 100 mL, 100 mL, Intravenous, PRN, Gaston Grider MD  •  sodium chloride 0.9 % flush 10 mL, 10 mL, Intravenous, PRN, Jasbir Pickard MD, 10 mL at 21 0800  •  sodium chloride 0.9 % infusion, 25 mL/hr, Intravenous, Continuous, Brown Whitfield MD, Last Rate: 25 mL/hr at 21, 25 mL/hr at 21 06      Review of Systems   Unable to perform ROS: Intubated       Objective     Vital Signs:  Temp (24hrs), Av.5 °F (36.9 °C), Min:97.4 °F (36.3 °C), Max:100.9 °F (38.3 °C)      /53   Pulse 99   Temp (!) 100.9 °F (38.3 °C) (Axillary)   Resp (!) 29   Ht 190.5 cm (75\")   Wt 84.2 kg (185 lb 11.2 oz)   SpO2 90%   BMI 23.21 kg/m²         Physical Exam     General: Patient is chronically and acutely ill-appearing lying in bed    HEENT: ET tube in place and connected to ventilator.  OG tube in place.  Respiratory: Patient appears a little more comfortable and is is not using as much accessory muscles to breathe today  Neuro:-Patient currently not spontaneously moving left upper extremity.  Extremities: Fairly symmetric edema upper and lower extremities  Psych:  not agitated    Right subclavian approach dialysis catheter in place  Left internal jugular central line in place.  Dressings clean dry and intact    Results Review:    I reviewed the patient's new clinical results.    Lab Results:          CBC:   Lab Results   Lab 21  0355 21  1215 21  0307 21  0727 21  0444 21  0357 21  0324   WBC 9.27 10.40 11.05* 13.75* 18.60* 22.40* 19.21*   HEMOGLOBIN 8.3* 8.2* 8.0* 7.7* 8.4* " 7.9* 6.7*   HEMATOCRIT 24.1* 24.0* 23.7* 23.5* 26.1* 23.6* 21.2*   PLATELETS 99* 93* 96* 158 255 275 358         CMP:   Lab Results   Lab 07/28/21  0355 07/28/21  0355 07/29/21  1215 07/30/21  0306 08/01/21  0444 08/02/21  0357 08/03/21  0324   SODIUM 133*   < > 133*   < > 131* 129* 132*   POTASSIUM 4.7   < > 4.7   < > 4.7 5.3* 4.4   CHLORIDE 101   < > 99   < > 95* 94* 91*   CO2 19.0*   < > 23.0   < > 23.0 21.0* 26.0   BUN 52*   < > 58*   < > 71* 89* 64*   CREATININE 4.19*   < > 4.00*   < > 4.34* 5.10* 3.58*   CALCIUM 8.8   < > 8.5*   < > 8.4* 8.4* 8.3*   BILIRUBIN 0.3  --  0.4  --   --   --  0.3   ALK PHOS 555*  --  639*  --   --   --  656*   ALT (SGPT) 14  --  12  --   --   --  11   AST (SGOT) 20  --  17  --   --   --  24   GLUCOSE 346*   < > 287*   < > 162* 98 250*    < > = values in this interval not displayed.       TB Skin Test (Reading)  Order: [id number]  Status:  Final result   Visible to patient:  No (not released) Next appt:  None  Specimen Information: Blood         0 Result Notes  Component   Ref Range & Units 2 d ago   TB Skin Test  Negative    Induration   0 - 10 mm 0    Read Date & Time  7/28/21 1830            TSPOT  Order: [id number]  Status:  Final result   Visible to patient:  No (not released) Next appt:  None  Specimen Information: Blood         0 Result Notes  Component   Ref Range & Units 3 d ago   TSPOTTB   Negative Negative    T-SPOT Panel A  0    T-SPOT Panel B  0    TSPOT NIL CONTROL INTERP  Passed    TSPOT POS CONTROL INTERP  Passed    Resulting Agency [facility]         Specimen Collected: 07/27/21 03:57 Last Resulted: 07/29/21 09:50             Contains abnormal data QuantiFERON TB Plus Client Incubated  Order: [id number]  Status:  Final result   Visible to patient:  No (not released) Next appt:  None  Specimen Information: Blood         0 Result Notes  Component   Ref Range & Units 3 d ago   QuantiFERON Criteria  Comment    Comment: The QuantiFERON-TB Gold Plus result is determined by  subtracting   the Nil value from either TB antigen (Ag) tube. The mitogen tube   serves as a control for the test.   QUANTIFERON TB1 AG VALUE   IU/mL 0.00    QUANTIFERON TB2 AG VALUE   IU/mL 0.01    QuantiFERON Nil Value   IU/mL 0.00    QuantiFERON Mitogen Value   IU/mL 0.00    QUANTIFERON-TB GOLD PLUS   Negative IndeterminateAbnormal     Comment: Mitogen (positive control) gave low response. This may occur due to   suboptimal pre-analytical handling.   The specimen received for QuantiFERON testing was incubated by the   ordering institution. Specific procedures outlined in our Directory   of Services and in the package insert for the QuantiFERON Gold   (In Tube) test must be followed to enable for proper stimulation of   cells for the production of interferon gamma.   Chemiluminescence immunoassay methodology   Resulting Agency "[employer]      Narrative  Performed by: [facility]  Performed at:  [address]  [id number]   : Chirag Webber PhD, Phone:  [phone]      Specimen Collected: 07/27/21 03:57 Last Resulted: 07/30/21 06:09             Culture Results:  Respiratory collected last night greater than 25 WBCs, few epithelial cells, rare gram-negative bacilli    Results for BREE NAYLOR (MRN [id number]) as of 8/1/2021 14:07   Ref. Range 7/31/2021 13:43   Color, UA Latest Ref Range: Yellow, Straw  Yellow   Appearance, UA Latest Ref Range: Clear  Turbid (A)   Specific Gravity, UA Latest Ref Range: >1.005-<1.030  1.025   pH, UA Latest Ref Range: 5.0 - 8.0  8.0   Glucose Latest Ref Range: Negative  100 mg/dL (Trace) (A)   Ketones, UA Latest Ref Range: Negative  Negative   Blood, UA Latest Ref Range: Negative  Large (3+) (A)   Nitrite, UA Latest Ref Range: Negative  Negative   Leukocytes, UA Latest Ref Range: Negative  Large (3+) (A)   Protein, UA Latest Ref Range: Negative  >=300 mg/dL (3+) (A)   Bilirubin, UA Latest Ref Range: Negative  Negative   Urobilinogen, UA  Latest Ref Range: 0.2 - 1.0 E.U./dL  0.2 E.U./dL   RBC, UA Latest Ref Range: None Seen /HPF 13-20 (A)   WBC, UA Latest Ref Range: None Seen /HPF Too Numerous to Count (A)   Bacteria, UA Latest Ref Range: None Seen /HPF 4+ (A)   Squamous Epithelial Cells, UA Latest Ref Range: None Seen, 0-2 /HPF 7-12 (A)       urine with yeast      Microbiology Results Abnormal     Procedure Component Value - Date/Time    Blood Culture With VANESSA - Blood, Arm, Left [[id number]] Collected: 07/30/21 1957    Lab Status: Preliminary result Specimen: Blood from Arm, Left Updated: 08/02/21 2131     Blood Culture No growth at 3 days    Blood Culture With VANESSA - Blood, Hand, Left [[id number]] Collected: 07/30/21 1957    Lab Status: Preliminary result Specimen: Blood from Hand, Left Updated: 08/02/21 2131     Blood Culture No growth at 3 days    Blood Culture With VANESSA - Blood, Arm, Right [[id number]] Collected: 07/31/21 1954    Lab Status: Preliminary result Specimen: Blood from Arm, Right Updated: 08/02/21 2017     Blood Culture No growth at 2 days    Blood Culture With VANESSA - Blood, Arm, Left [[id number]] Collected: 07/31/21 1953    Lab Status: Preliminary result Specimen: Blood from Arm, Left Updated: 08/02/21 2017     Blood Culture No growth at 2 days    Miscellaneous Micro Request - Specimen Not Sent, Specimen Not Sent [[id number]] Resulted: 08/02/21 1103    Lab Status: Final result Specimen: Specimen Not Sent Updated: 08/02/21 1103     Extra Tube --    Blood Culture - Blood, Blood, Arterial Line [[id number]] Collected: 07/22/21 1142    Lab Status: Final result Specimen: Blood, Arterial Line Updated: 07/27/21 1215     Blood Culture No growth at 5 days    Blood Culture - Blood, Hand, Right [[id number]] Collected: 07/22/21 1006    Lab Status: Final result Specimen: Blood from Hand, Right Updated: 07/27/21 1031     Blood Culture No growth at 5 days    Blood Culture - Blood, Arm, Left [[id number]] Collected: 07/16/21 1115    Lab Status: Final result  Specimen: Blood from Arm, Left Updated: 07/21/21 1145     Blood Culture No growth at 5 days    Blood Culture - Blood, Arm, Left [[id number]] Collected: 07/16/21 0957    Lab Status: Final result Specimen: Blood from Arm, Left Updated: 07/21/21 1030     Blood Culture No growth at 5 days    MRSA Screen, PCR (Inpatient) - Swab, Nares [[id number]]  (Normal) Collected: 07/16/21 1418    Lab Status: Final result Specimen: Swab from Nares Updated: 07/16/21 1558     MRSA PCR No MRSA Detected    S. Pneumo Ag Urine or CSF - Urine, Urine, Clean Catch [[id number]]  (Normal) Collected: 07/16/21 1331    Lab Status: Final result Specimen: Urine, Clean Catch Updated: 07/16/21 1415     Strep Pneumo Ag Negative    Legionella Antigen, Urine - Urine, Urine, Clean Catch [[id number]]  (Normal) Collected: 07/16/21 1331    Lab Status: Final result Specimen: Urine, Clean Catch Updated: 07/16/21 1415     LEGIONELLA ANTIGEN, URINE Negative    COVID-19,[facility] IN-HOUSE,Nasal Swab No Transport Media 3-4 HR TAT - Swab, Nasal Cavity [[id number]]  (Normal) Collected: 07/16/21 1002    Lab Status: Final result Specimen: Swab from Nasal Cavity Updated: 07/16/21 1059     COVID19 Not Detected    Narrative:      Fact sheet for providers: https://www.fda.gov/media/701376/download     Fact sheet for patients: https://www.fda.gov/media/684577/download    Test performed by PCR.    Consider negative results in combination with clinical observations, patient history, and epidemiological information.        AFB Culture - Wash, Bronchus  Order: [id number]  Status:  Preliminary result   Visible to patient:  No (not released) Next appt:  None  Specimen Information: Bronchus; Wash         0 Result Notes  AFB Culture Acid Fast Bacilli isolatedAbnormal        DATE SENT 7/26/21       SENT TO STATE? Sent to state for ID              AFB Stain  No acid fast bacilli seen on direct smear      No acid fast bacilli seen on concentrated smear            Resulting Agency: [facility]         Specimen Collected: 07/16/21 17:01                   Radiology:   Imaging Results (Last 72 Hours)     Procedure Component Value Units Date/Time    XR Chest 1 View [[id number]] Collected: 08/03/21 0719     Updated: 08/03/21 0723    Narrative:      EXAM: XR CHEST 1 VW-     INDICATION: intubated; I46.9-Cardiac arrest, cause unspecified     COMPARISON: Prior     FINDINGS:     Endotracheal tube is 5 cm above the andre. Bilateral CVL's appear  stable in position. Enteric tube terminates below the diaphragm.     Cardiac silhouette is stable. No change in extensive bilateral  interstitial and airspace opacity. No large pleural effusion or visible  pneumothorax.       Impression:         No change in appearance of the chest.  This report was finalized on 08/03/2021 07:20 by Dr. Jared Cerrato MD.    XR Chest 1 View [[id number]] Collected: 08/02/21 0726     Updated: 08/02/21 0730    Narrative:      EXAM: XR CHEST 1 VW-     INDICATION: intubated; I46.9-Cardiac arrest, cause unspecified     COMPARISON: 8/1/2021     FINDINGS:     Endotracheal tube is 5 cm above the andre. RIGHT IJ CVL tip overlies  the low SVC. Left-sided CVL tip overlies the upper SVC. Enteric tube  terminates below the diaphragm out of the field of view.     Cardiac silhouette is within normal limits and stable. No change in  dense bilateral interstitial and airspace opacity. No visible  pneumothorax. No large pleural effusion.       Impression:         No change in appearance of the chest.  This report was finalized on 08/02/2021 07:27 by Dr. Jared Cerrato MD.    XR Chest 1 View [[id number]] Collected: 08/01/21 0916     Updated: 08/01/21 0920    Narrative:      Frontal supine radiograph of the chest 8/1/2021 4:09 AM CDT     History: intubated; I46.9-Cardiac arrest, cause unspecified     Comparison: Exam dated 7/31/2021      Findings:      Lines and tubes are stable in position. Bilateral, essentially diffuse  patchy pulmonary infiltrates are  unchanged. Overall lung volumes are  stable. No pneumothorax. The cardiomediastinal silhouette and pulmonary  vascularity are unchanged.       No acute osseous or soft tissue abnormality is noted.        Impression:      Impression:   1. No significant interval change.  2. Lung volumes are stable. ET tube is in good position.  3. Bilateral, essentially diffuse pulmonary infiltrates are unchanged.        This report was finalized on 08/01/2021 09:17 by Dr Onofre Stahl, .          Assessment/Plan     Active Hospital Problems    Diagnosis    • **Cardiac arrest (CMS/formerly [facility])    • Acute kidney injury (CMS/formerly [facility])    • Acute CVA (cerebrovascular accident) (CMS/formerly [facility])    • Moderate malnutrition (CMS/formerly [facility])    • UTI due to Klebsiella species    • Aspiration pneumonia of left lower lobe due to vomit (MRSA, Klebsiella, and Aspergillus on culture)    • Acute respiratory failure with hypoxia (CMS/formerly [facility])    • Right middle and lower pulmonary embolus (CMS/formerly [facility])    • Closed fracture of rib due to CPR    • Shock, septic (CMS/formerly [facility])    • Lactic acidosis    • Non-traumatic rhabdomyolysis    • Type 2 diabetes mellitus with hyperglycemia, with long-term current use of insulin (CMS/formerly [facility])    • Leukocytosis    • Normocytic anemia        IMPRESSION:  1. Leukocytosis- decreased somewhat today.  2. AFB growing from bronchoscopy culture-T spot negative.  PPD negative.  QuantiFERON gold indeterminate.  Contact the state lab was not able to find any acid-fast bacilli on specimen that they received  3. Bilateral pulmonary infiltrates-treated as as pneumonia.  Repeat sputum July 31 pending but there is growth present.  There is rare gram-positive cocci on Gram stain.  4. Uncontrolled diabetes mellitus with hemoglobin A1c over 15  5. Yeast in urine-on repeat culture this time via in and out cath  6. Heavy growth of MRSA from respiratory culture from July 16-treated  7. Scant growth of Klebsiella pneumonia from respiratory culture July  16-treated  8. Fever-resolved then recurred July 30-trending down to low-grade with T-max and 99 range  9. Likely hypoxic  brain injury  10. Left temporal lobe stroke  11. Pulmonary embolism  12. Acute kidney injury-on hemodialysis  13. Thrombocytopenia-resolved      RECOMMENDATION:   · Continue fluconazole  · Await identification and susceptibilities of urine cultures-contacted microbiology and specimen has been sent to [facility]  · Follow-up on respiratory culture  · Continue to monitor blood cultures obtained July 31  · We will resume more broad empiric antibiotic therapy if hypotension, positive culture, etc.      Discussed with DARRION Andre MD  08/03/21  07:32 CDT

## 2021-08-03 NOTE — PROGRESS NOTES
PULMONARY AND CRITICAL CARE PROGRESS NOTE - [facility]    Patient: Ford High    1965    MR# [id number]    Acct# [id number]  08/03/21   10:26 CDT  Referring Provider: Christ Bassett MD    Chief Complaint: Mechanically ventilated    Interval history: He remains intubated off sedation. Neurological status unchanged.  He remains on Levophed for blood pressure support.  He has SCDs in place.  He is receiving nutrition via tube feeds. Still having intermittent fevers. T-high 100.8 this morning. Hemoglobin down to 6.7. Received dialysis yesterday 8-2. Oxygen saturation 91% on peep of 6 and fio2 0.5.   Meds:  glycopyrrolate, 0.2 mg, Intravenous, 4x Daily  LORazepam, 1 mg, Intravenous, Q6H         Review of Systems:     Cannot obtain due to mechanical ventilated state     Ventilator Settings:        Resp Rate (Set): 24  Pressure Support (cm H2O): 10 cm H20  FiO2 (%): 50 %  PEEP/CPAP (cm H2O): 6 cm H20  Minute Ventilation (L/min) (Obs): 17.9 L/min  Resp Rate (Observed) Vent: 29  I:E Ratio (Set): 1:2.1  I:E Ratio (Obs): 1:1.80  PIP Observed (cm H2O): 28 cm H2O     Physical Exam:  Temp:  [97.4 °F (36.3 °C)-100.9 °F (38.3 °C)] 100.9 °F (38.3 °C)  Heart Rate:  [0-103] 0  Resp:  [25-36] 29  BP: ()/(43-78) 108/53  FiO2 (%):  [40 %-50 %] 50 %    Intake/Output Summary (Last 24 hours) at 8/3/2021 1026  Last data filed at 8/3/2021 0400  Gross per 24 hour   Intake 1338.13 ml   Output 100 ml   Net 1238.13 ml     SpO2 Percentage    08/03/21 0708 08/03/21 0715 08/03/21 0926   SpO2: 97% 90% 93%      Physical Exam  Constitutional:       Appearance: He is cachectic. He is ill-appearing. He is not toxic-appearing.      Interventions: He is intubated.   HENT:      Mouth/Throat:      Comments: OG intact  Neck:      Thyroid: No thyromegaly.      Vascular: No JVD.   Cardiovascular:      Rate and Rhythm: Normal rate and regular rhythm.      Comments: Rate 98  Pulmonary:      Effort: He is intubated.    Musculoskeletal:      Right lower le+ Edema present.      Left lower le+ Edema present.   Skin:     Coloration: Skin is pale.   Neurological:      Mental Status: He is unresponsive.   Psychiatric:         Behavior: Behavior is not agitated.       Results from last 7 days   Lab Units 21  0324 21  0357 21  0444   WBC 10*3/mm3 19.21* 22.40* 18.60*   HEMOGLOBIN g/dL 6.7* 7.9* 8.4*   PLATELETS 10*3/mm3 358 275 255     Results from last 7 days   Lab Units 21  0324 21  0357 21  0444   SODIUM mmol/L 132* 129* 131*   POTASSIUM mmol/L 4.4 5.3* 4.7   BUN mg/dL 64* 89* 71*   CREATININE mg/dL 3.58* 5.10* 4.34*     Results from last 7 days   Lab Units 21  0455 21  0535 21  0510   PH, ARTERIAL pH units 7.404 7.283* 7.437   PCO2, ARTERIAL mm Hg 46.6* 52.0* 37.8   PO2 ART mm Hg 106.0 93.5 66.0*   FIO2 % 50 50 40     Blood Culture   Date Value Ref Range Status   2021 No growth at 24 hours  Preliminary     Culture results from last 30 days   Lab 21  1701   AFBCX Acid Fast Bacilli isolated*   FUNGUSCX Light growth (2+) Candida albicans*      Recent films:  XR Chest 1 View    Result Date: 8/3/2021   No change in appearance of the chest. This report was finalized on 2021 07:20 by Dr. Jared Cerrato MD.    XR Chest 1 View    Result Date: 2021   No change in appearance of the chest. This report was finalized on 2021 07:27 by Dr. Jared Cerrato MD.    Films reviewed personally by me.  My interpretation: ETT ok. Persistent bilateral infiltrates, stable. 8-3-21    Pulmonary Assessment:    Acute respiratory failure with hypoxia, multifactorial  Mechanical ventilation dependence   Status post aspiration episode  Acute renal failure, requiring dialysis   Right-sided pulmonary embolism  Status post cardiac arrest likely due to respiratory issue, with anoxic encephalopathy  Anemia requiring intermittent prbc transfusions   Septic shock, improved, now  hypotensive possibly related to fluid depletion  Leukocytosis     Recommend:     ETT D#20.  Continue mechanical ventilation. Current settings: AC rate 24, tv 600, peep 6, fio2 0.5.  His mental status with concerns of anoxia limits trial of spontaneous breathing trial and further weaning from the ventilator.  Await AFB result from state, quantiferon indeterminate, id following   Chest x-ray and ABG daily while on the ventilator  DVT prophylaxis - Eliquis  Stress ulcer prophylaxis, Protonix   Antibiotics per ID  Continue ipratropium and albuterol    Continue guaifenesin   Continue to monitor h&h, transfuse as appropriate   Continue enteral nutrition   Prognosis seems poor with multiorgan failure. Possibly comfort care once guardianship with sister is established  Significant concern for anoxia.    Electronically signed by DENISHA Posey on 8/3/2021 at 10:26 CDT    Physician substantive portion:  Patient remains poorly responsive, remains on the ventilator. Awaiting disposition from family members. He cannot provide history. Breath sounds diminished with central rhonchi. ET tube in place. Abdomen soft. No purposeful movements. Prognosis grim. Ongoing supportive treatment with expectation of overall improvement appears futile.    I have seen and examined patient personally, performing a face-to-face diagnostic evaluation with plan of care reviewed and developed with APRN and nursing staff. I have addended and/or modified the above history of present illness, physical examination, and assessment and plan to reflect my findings and impressions. Essential elements of the care plan were discussed with APRN above.  Agree with findings and assessment/plan as documented above.    Electronically signed by Gonzalez Vásquez MD, on 8/3/2021, 11:31 CDT

## 2021-08-03 NOTE — PROGRESS NOTES
[facility] Medicine Services  INPATIENT PROGRESS NOTE    Patient Name: Ford High  Date of Admission: 7/16/2021  Today's Date: 08/03/21  Length of Stay: 18  Primary Care Physician: Denys Yanez Jr., MD    Subjective   Chief Complaint: follow-up cardiac arrest  HPI   Patient remains intubated and on mechanical ventilation.  He is currently on 40% FiO2 and 6 of PEEP.  He has been on a low-dose of Levophed for blood pressure support.  No family currently at bedside.  I was able to speak with his sister, Amy, at bedside last PM.  She went to court yesterday to complete guardianship process.  She and her spouse both reiterated that they were confident Ford would not want to continue moving forward like this, and we discussed plans to transition to comfort measures.  She wanted to get some things finalized at the bank today before making this transition.    Review of Systems     All pertinent negatives and positives are as above. All other systems have been reviewed and are negative unless otherwise stated.     Objective    Temp:  [97.4 °F (36.3 °C)-100.9 °F (38.3 °C)] 100.9 °F (38.3 °C)  Heart Rate:  [] 99  Resp:  [24-36] 29  BP: ()/(43-78) 108/53  FiO2 (%):  [40 %-50 %] 40 %  Physical Exam  Vitals reviewed.   Constitutional:       Appearance: He is ill-appearing.   HENT:      Head: Normocephalic.      Mouth/Throat:      Pharynx: No oropharyngeal exudate.   Eyes:      Pupils: Pupils are equal, round, and reactive to light.   Cardiovascular:      Rate and Rhythm: Normal rate.   Pulmonary:      Breath sounds: Rhonchi (scattered) present. No wheezing.      Comments: On mechanical ventilation at 40% Fi02 and 6 PEEP  Abdominal:      Palpations: Abdomen is soft.   Genitourinary:     Comments: Scrotal edema; fecal mgmt system in place  Musculoskeletal:         General: No deformity.      Cervical back: Neck supple.   Skin:     General: Skin is warm.    Neurological:      Comments: Neuro status unchanged.  Eyes open.  Unable to follow any commands.  Occasional intermittent movement noted to LUE       Results Review:  I have reviewed the labs, radiology results, and diagnostic studies.    Laboratory Data:   Results from last 7 days   Lab Units 08/03/21  0324 08/02/21  0357 08/01/21  0444   WBC 10*3/mm3 19.21* 22.40* 18.60*   HEMOGLOBIN g/dL 6.7* 7.9* 8.4*   HEMATOCRIT % 21.2* 23.6* 26.1*   PLATELETS 10*3/mm3 358 275 255        Results from last 7 days   Lab Units 08/03/21  0324 08/02/21  0357 08/01/21  0444 07/30/21  0306 07/29/21  1215 07/28/21  0355 07/28/21  0355   SODIUM mmol/L 132* 129* 131*   < > 133*   < > 133*   POTASSIUM mmol/L 4.4 5.3* 4.7   < > 4.7   < > 4.7   CHLORIDE mmol/L 91* 94* 95*   < > 99   < > 101   CO2 mmol/L 26.0 21.0* 23.0   < > 23.0   < > 19.0*   BUN mg/dL 64* 89* 71*   < > 58*   < > 52*   CREATININE mg/dL 3.58* 5.10* 4.34*   < > 4.00*   < > 4.19*   CALCIUM mg/dL 8.3* 8.4* 8.4*   < > 8.5*   < > 8.8   BILIRUBIN mg/dL 0.3  --   --   --  0.4  --  0.3   ALK PHOS U/L 656*  --   --   --  639*  --  555*   ALT (SGPT) U/L 11  --   --   --  12  --  14   AST (SGOT) U/L 24  --   --   --  17  --  20   GLUCOSE mg/dL 250* 98 162*   < > 287*   < > 346*    < > = values in this interval not displayed.       Culture Data:   Blood Culture   Date Value Ref Range Status   07/31/2021 No growth at 24 hours  Preliminary   07/31/2021 No growth at 24 hours  Preliminary   07/30/2021 No growth at 2 days  Preliminary   07/30/2021 No growth at 2 days  Preliminary     Urine Culture   Date Value Ref Range Status   07/31/2021 Yeast isolated (A)  Preliminary     Respiratory Culture   Date Value Ref Range Status   07/31/2021 Growth present, too young to evaluate  Preliminary   07/30/2021 Scant growth (1+) Gram Negative Bacilli (A)  Preliminary   07/30/2021 No Normal Respiratory Tawanna (A)  Preliminary       Radiology Data:   Imaging Results (Last 24 Hours)     Procedure  Component Value Units Date/Time    XR Chest 1 View [[id number]] Collected: 08/03/21 0719     Updated: 08/03/21 0723    Narrative:      EXAM: XR CHEST 1 VW-     INDICATION: intubated; I46.9-Cardiac arrest, cause unspecified     COMPARISON: Prior     FINDINGS:     Endotracheal tube is 5 cm above the andre. Bilateral CVL's appear  stable in position. Enteric tube terminates below the diaphragm.     Cardiac silhouette is stable. No change in extensive bilateral  interstitial and airspace opacity. No large pleural effusion or visible  pneumothorax.       Impression:         No change in appearance of the chest.  This report was finalized on 08/03/2021 07:20 by Dr. Jared Cerrato MD.          I have reviewed the patient's current medications.     Assessment/Plan     Active Hospital Problems    Diagnosis    • **Cardiac arrest (CMS/HCC)    • Acute kidney injury (CMS/HCC)    • Acute CVA (cerebrovascular accident) (CMS/HCC)    • Moderate malnutrition (CMS/HCC)    • UTI due to Klebsiella species    • Aspiration pneumonia of left lower lobe due to vomit (MRSA, Klebsiella, and Aspergillus on culture)    • Acute respiratory failure with hypoxia (CMS/HCC)    • Right middle and lower pulmonary embolus (CMS/HCC)    • Closed fracture of rib due to CPR    • Shock, septic (CMS/HCC)    • Lactic acidosis    • Non-traumatic rhabdomyolysis    • Type 2 diabetes mellitus with hyperglycemia, with long-term current use of insulin (CMS/HCC)    • Leukocytosis    • Normocytic anemia      Plan:  1.  Remains on mechanical ventilation at 50% Fi02 and 6 PEEP  2.  Received dialysis yesterday  3.  Hgb 6.7 this AM.  Anticipate transitioning to comfort care likely later today and will not transfuse with blood products at this time  4.  Currently on Levophed IV gtt for goal MAP 65  5.  Tube feeds for nutrition  6.  He has completed course of ceftaroline and levofloxacin.  7.  Currently on fluconazole  8.  Palliative care following and appreciate their  assistance  9.  Anticipate transitioning to comfort care likely later today    Electronically signed by Christ Bassett MD, 08/03/21, 07:59 CDT.

## 2021-08-04 LAB
BACTERIA SPEC AEROBE CULT: NORMAL
BACTERIA SPEC AEROBE CULT: NORMAL

## 2021-08-04 NOTE — DISCHARGE SUMMARY
[facility] Medicine Services  DISCHARGE SUMMARY       Date of Admission: 7/16/2021  Date of Discharge:  8/4/2021  Primary Care Physician: Denys Yanez Jr., MD    Presenting Problem/History of Present Illness:  Cardiac arrest (CMS/[facility]) [I46.9]     Final Discharge Diagnoses:  Active Hospital Problems    Diagnosis    • **Cardiac arrest (CMS/[facility])    • Acute kidney injury (CMS/[facility])    • Acute CVA (cerebrovascular accident) (CMS/[facility])    • Moderate malnutrition (CMS/[facility])    • UTI due to Klebsiella species    • Aspiration pneumonia of left lower lobe due to vomit (MRSA, Klebsiella, and Aspergillus on culture)    • Acute respiratory failure with hypoxia (CMS/[facility])    • Right middle and lower pulmonary embolus (CMS/[facility])    • Closed fracture of rib due to CPR    • Shock, septic (CMS/[facility])    • Lactic acidosis    • Non-traumatic rhabdomyolysis    • Type 2 diabetes mellitus with hyperglycemia, with long-term current use of insulin (CMS/[facility])    • Leukocytosis    • Normocytic anemia        Hospital Course:  The patient was admitted to [facility] on 7/16/2021 by Dr. Pickard in the setting of cardiacpulmonary arrest.  Review of the admitting history and physical shows that the patient was found at his home by his friend, slumped over in the chair and cyanotic.  He apparently had mustard and a sandwich sitting in his lap.  EMS was dispatched.  He lost a pulse in route.  Return of spontaneous circulation was obtained in the field.  At the time of admission, it was felt that he was suffering from aspiration and suspected sepsis.  He was also found to have acute pulmonary embolism with heavy clot burden of the right middle and lower lobes.  He was admitted to the intensive care unit on ventilatory support and in critical condition.     He experienced multiorgan failure including respiratory failure requiring mechanical ventilation in addition to acute renal failure requiring  hemodialysis.  Infectious disease, nephrology, neurology, and pulmonology followed along with us during this hospitalization.  He was felt to have aspiration pneumonia at presentation with septic shock and lactic acidosis.  Respiratory cultures were positive for MRSA in addition to Klebsiella, and he completed a course of antibiotics during this hospitalization for these bacteria.  Culture also grew out Aspergillus species so he was also receiving voriconazole, this course was also completed.       Patient was anticoagulated during this hospitalization for known pulmonary embolism.  He was initially on Lovenox however in the setting of renal insufficiency was changed over to a heparin IV drip.  Echocardiogram with bubble study done recently had negative saline test results with EF of 70%.     There was concern very early about his neuro status.  We were very concerned for the possibility of anoxic brain injury and hypoxemic ischemic encephalopathy.  He was having difficulty with awakening from sedation and had poor neurologic examinations.  CT scan of the head without contrast on 7/18 failed to show an acute intracranial abnormality.  This was followed up by an MRI of the brain with and without contrast on 7/19 that showed no acute intracranial abnormalities.  This study was repeated on 7/21 without contrast and at that point showed an acute infarct of the left temporal lobe medially.  No hemorrhagic conversion.  EEG was also performed during this hospitalization which was abnormal as well.  Despite being off all sedating medications patient would remain essentially unresponsive.  He would open his eyes, with inability to follow any commands.  No purposeful movements.  Overall prognosis appears to be grim.  He required tube feedings for nutrition.  Again, he was also requiring hemodialysis in the setting of renal failure.     Recent hemoglobin A1c was found to be 15.5 indicative of poorly controlled diabetes  "mellitus.  He was started on insulin during this hospitalization his blood sugar trend was monitored closely.     Palliative care was consulted during this hospitalization and followed along with us as well.       Multiple conferences with the family -patient's sister, Amy.  He was on day 20 of mechanical ventilation with inability to liberate from the ventilator.  Despite being off sedation his neuro status remained poor, and family felt confident that Mr. High would not warrant to continue forward with aggressive care in this setting.  Decision was made to transition to comfort care measures.  A palliative extubation was performed on 8/3 and patient passed away at 934.    Physical Exam on Discharge:  /51   Pulse (!) 0 Comment: NO HEART TONES AUSCULTATED BY MYSELF OR TAMERA MORAN RN  Temp (!) 100.9 °F (38.3 °C) (Axillary)   Resp (!) 29   Ht 190.5 cm (75\")   Wt 84.2 kg (185 lb 11.2 oz)   SpO2 (!) 23%   BMI 23.21 kg/m²   Physical Exam      Discharge Disposition:      Date of Death: 8/3/2021    Time of Death: 934    Cause of Death: Natural Causes    Electronically signed by Christ Bassett MD, 21, 06:57 CDT.          "

## 2021-08-05 LAB
BACTERIA SPEC AEROBE CULT: NORMAL
BACTERIA SPEC AEROBE CULT: NORMAL

## 2021-08-16 LAB — FUNGUS WND CULT: ABNORMAL

## 2021-08-30 LAB
MYCOBACTERIUM SPEC CULT: ABNORMAL
NIGHT BLUE STAIN TISS: ABNORMAL
NIGHT BLUE STAIN TISS: ABNORMAL

## 2023-02-23 NOTE — PROGRESS NOTES
Impression: Hypermetropia, right eye: H52.01. Plan: Prescription given for glasses. Palliative Care Daily Progress Note   support for patient/family    Code Status:   Code Status and Medical Interventions:   Ordered at: 07/28/21 1341     Level Of Support Discussed With:    Next of Kin (If No Surrogate)     Code Status:    No CPR     Medical Interventions (Level of Support Prior to Arrest):    Full     Comments:    Amy garcia      Advanced Directives: Advance Directive Status: Patient does not have advance directive   Goals of Care: Ongoing.     S: Medical record reviewed. Events noted.  Remains intubated on ventilator support.  He remains unresponsive.  Received hemodialysis today with removal of 2.5 L fluid.  Respiratory culture positive for MRSA, Aspergillus, and Klebsiella pneumonia. UA positive for yeast. Amy Garcia is supposed to be seeking guardianship through the courts today.       Review of Systems   Unable to perform ROS: intubated     Pain Assessment  CPOT and PAINAD Scales: CPOT (Critical-Care Pain Observation Tool)  CPOT Facial Expression: 0-->relaxed, neutral  CPOT Body Movements: 0-->absence of movements  CPOT Muscle Tension: 0-->relaxed  Ventilator Compliance/Vocalization: 0-->tolerating ventilator or movement  CPOT Score: 0    O:     Intake/Output Summary (Last 24 hours) at 8/2/2021 1229  Last data filed at 8/2/2021 1210  Gross per 24 hour   Intake 1935.99 ml   Output 300 ml   Net 1635.99 ml       Diagnostics: Reviewed    Current Facility-Administered Medications   Medication Dose Route Frequency Provider Last Rate Last Admin   • acetaminophen (TYLENOL) tablet 650 mg  650 mg Oral Q4H PRN Jasbir Pickard MD   650 mg at 07/30/21 1918    Or   • acetaminophen (TYLENOL) suppository 650 mg  650 mg Rectal Q4H PRN Jasbir Pickard MD       • albumin human 25 % IV SOLN 25 g  25 g Intravenous PRN Gaston Grider MD   25 g at 08/02/21 0904   • apixaban (ELIQUIS) tablet 5 mg  5 mg Oral Q12H Jasbir Pickard MD   5 mg at 08/02/21 0854   • atorvastatin (LIPITOR) tablet 10 mg  10  mg Oral Nightly Christ Bassett MD   10 mg at 08/01/21 2038   • dexmedetomidine (PRECEDEX) 400 mcg in 100 mL NS infusion  0.2-1.5 mcg/kg/hr Intravenous Titrated Christ Bassett MD   Held at 07/23/21 0912   • dextrose (D50W) 25 g/ 50mL Intravenous Solution 25 g  25 g Intravenous Q15 Min PRN Jasbir Pickard MD   25 g at 07/17/21 0537   • dextrose (GLUTOSE) oral gel 15 g  15 g Oral Q15 Min PRN Jasbir Pickard MD       • fluconazole (DIFLUCAN) IVPB 100 mg  100 mg Intravenous Daily Radha Mondragon MD 50 mL/hr at 08/02/21 0855 100 mg at 08/02/21 0855   • glucagon (human recombinant) (GLUCAGEN DIAGNOSTIC) injection 1 mg  1 mg Subcutaneous Q15 Min PRN Jasbir Pickard MD       • guaiFENesin (ROBITUSSIN) 100 MG/5ML oral solution 400 mg  400 mg Nasogastric Q6H Jasbir Pickard MD   400 mg at 08/02/21 0854   • heparin (porcine) injection 3,200 Units  3,200 Units Intracatheter PRN Chele Kasper MD   3,200 Units at 08/02/21 1145   • HYDROmorphone (DILAUDID) injection 0.5 mg  0.5 mg Intravenous Q2H PRN Jasbir Pickard MD   0.5 mg at 08/02/21 0845   • insulin detemir (LEVEMIR) injection 10 Units  10 Units Subcutaneous Q12H Christ Bassett MD   10 Units at 08/01/21 2037   • insulin regular (humuLIN R,novoLIN R) injection 0-9 Units  0-9 Units Subcutaneous Q6H Jasbir Pickard MD   4 Units at 08/02/21 0007   • ipratropium-albuterol (DUO-NEB) nebulizer solution 3 mL  3 mL Nebulization 4x Daily - RT Jasbir Pickard MD   3 mL at 08/02/21 1006   • lidocaine (LIDODERM) 5 % 1 patch  1 patch Transdermal Q24H Jasbir Pickard MD   1 patch at 08/02/21 0854   • LORazepam (ATIVAN) injection 1 mg  1 mg Intravenous Q6H PRN Jasbir Pickard MD   1 mg at 08/02/21 0922   • norepinephrine (LEVOPHED) 8 mg in 250 mL NS infusion (premix)  0.02-0.3 mcg/kg/min Intravenous Titrated Christ Bassett MD 4.72 mL/hr at 08/02/21 0939 0.03 mcg/kg/min at 08/02/21 0939   • ondansetron  "(ZOFRAN) tablet 4 mg  4 mg Oral Q6H PRN Jasibr Pickard MD        Or   • ondansetron (ZOFRAN) injection 4 mg  4 mg Intravenous Q6H PRN Jasbir Pickard MD       • oxyCODONE (ROXICODONE) 5 MG/5ML solution 5 mg  5 mg Oral Q6H PRN Christ Bassett MD   5 mg at 08/01/21 0748   • pantoprazole (PROTONIX) injection 40 mg  40 mg Intravenous Q AM Jasbir Pickard MD   40 mg at 08/02/21 0508   • sennosides-docusate (PERICOLACE) 8.6-50 MG per tablet 1 tablet  1 tablet Oral BID Jasbir Pickard MD   1 tablet at 08/02/21 0854   • sodium chloride 0.9 % bolus 100 mL  100 mL Intravenous PRN Chele Kasper MD       • sodium chloride 0.9 % bolus 100 mL  100 mL Intravenous PRN Gaston Grider MD       • sodium chloride 0.9 % flush 10 mL  10 mL Intravenous PRN Jasbir Pickard MD   10 mL at 08/01/21 0800   • sodium chloride 0.9 % infusion  25 mL/hr Intravenous Continuous Brown Whitfield MD 25 mL/hr at 07/26/21 0602 25 mL/hr at 07/26/21 0602     dexmedetomidine, 0.2-1.5 mcg/kg/hr, Last Rate: Stopped (07/23/21 0912)  norepinephrine, 0.02-0.3 mcg/kg/min, Last Rate: 0.03 mcg/kg/min (08/02/21 0939)  sodium chloride, 25 mL/hr, Last Rate: 25 mL/hr (07/26/21 0602)      •  acetaminophen **OR** acetaminophen  •  albumin human  •  dextrose  •  dextrose  •  glucagon (human recombinant)  •  heparin (porcine)  •  HYDROmorphone  •  LORazepam  •  ondansetron **OR** ondansetron  •  oxyCODONE  •  sodium chloride  •  sodium chloride  •  sodium chloride    A:    /51   Pulse 88   Temp 97.6 °F (36.4 °C) (Axillary)   Resp 24   Ht 190.5 cm (75\")   Wt 85.3 kg (188 lb 1.6 oz)   SpO2 90%   BMI 23.51 kg/m²     Vitals and nursing note reviewed.   Constitutional:       Appearance: Ill-appearing and acutely ill-appearing.      Interventions: Intubated.   Eyes:      Pupils: Pupils are equal, round, and reactive to light.   HENT:      Head: Normocephalic.    Mouth/Throat:      Comments: OG  Neck:      Comments: Left " IJ  Pulmonary:      Effort: Intubated.   Cardiovascular:      Tachycardia present.   Abdominal:      Comments: Tolerating tube feeds   Musculoskeletal:      Cervical back: Neck supple.   Genitourinary:     Comments: Elder catheter in place  Neurological:      Mental Status: Unresponsive.   Psychiatric:         Cognition and Memory: Cognition is impaired.      Comments: Likely has anoxic brain injury per neurology      Patient status: Disease state: Deteriorating despite treatments.  Functional status: Palliative Performance Scale Score: Performance 10% based on the following measures: Ambulation: Totally bed bound, Activity and Evidence of Disease: Unable to do any work, extensive evidence of disease, Self-Care: Total care required,  Intake: Mouth care only, LOC: Drowsy or comatose   Nutritional status: Albumin 2.20. Body mass index is 23.31 kg/m².      Family support: The patient lacks significant family support..  Advance Directives: Advance Directive Status: Patient does not have advance directive   POA/Healthcare surrogate-Amy thomas listed at next of kin...Patient has a daughter, Brooke High who apparently is estranged per patient's sisterAmy. *.     Impression/Problem List:     1.  Cardiac arrest  2.  Aspiration pneumonia of left lower lobe due to vomit  3.  Acute respiratory failure with hypoxia  4.  Pulmonary embolus of right middle and lower lobes  5.  Closed fracture of rib/sternum due to CPR  6.  Shock, septic  7.   Acute left temporal infarct, per MRI  8.  Medical noncompliance  9.  UTI, Klebsiella  10.  Type 2 diabetes mellitus  11.  MRSA and Aspergillus, sputum  12.  Anemia  13.  Metabolic encephalopathy  14.  Acute kidney injury     Recommendations/Plan:  1. plan: Goals of care include CODE STATUS No CPR/full interventions per attending.     2.  Palliative care encounter  -Plan for emergency guardianship court date on Monday 8/2.    -7/30-Received a call from patient's sisterAmy  this morning.  She has been unsuccessful with finding a  home that will assist with cremation services.  She has checked with [employer] and informed that they no longer offer financial assistance.  She obviously has no extra financial means as she had to take a loan out  for $125 to pursue emergency guardianship.  We discussed the possibility of transitioning to comfort measures now rather than waiting for complete guardianship process.  She is wanting to have things in place before she terminates care.  I have asked unit SW to assist further.     -Poor prognosis, severe PE, multiorgan failure, stroke, dialysis started , hypoxic brain injury, very likely an EEG is quite slow neurologically unchanged off sedation  -AFB findings per bronchoscopy.  T spot and PPD pending. Respiratory culture positive for MRSA, Aspergillus, and Klebsiella pneumonia.      -CODE STATUS changes to no CPR.  Discussed patient's extremely poor overall prognosis and if aggressive measures continue to be pursued would require long-term ventilator support, PEG tube placement, dialysis, and long-term facility placement.  Sister, Amy states she has been thinking a lot about his current situation and does not believe her brother would want to live on machines like this.  We discussed de-escalation and transition to comfort.  Made a statement that patient has no insurance for cremation and would like possible resources.  Patient's sister has sought emergency guardianship, unfortunately, she does not have a court date until .  We discussed she as her brothers next of kin is considered his legal decision maker and advocate.  Questions answered to family satisfaction.  Will reach out to  and patient relations to see if they can assist with resources for cremation services at family's request.     -Discussed case with the legal and ethics liaison this morning. Attempts to reach patient's daughter have been  unsuccessful.  As such, assistance with guardianship packet completed at request of Dr. Pickard and patient's sister so that she may seek emergency guardianship.      Thank you for allowing us to participate in patient's plan of care. Palliative Care Team will continue to follow patient.     Time spent: 30 minutes spent reviewing medical and medication records, assessing and examining patient, discussing with family, answering questions, providing some guidance about a plan and documentation of care, and coordinating care with other healthcare members, with > 50% time spent face to face.     Mariela Ocampo, APRN  8/2/2021

## 2023-03-02 NOTE — PROGRESS NOTES
Discharge Planning Assessment  [facility]     Patient Name: Ford High  MRN: [id number]  Today's Date: 2/7/2020    Admit Date: 2/6/2020    Discharge Needs Assessment     Row Name 02/07/20 1635       Living Environment    Lives With  alone    Current Living Arrangements  home/apartment/condo    Primary Care Provided by  self    Provides Primary Care For  no one    Family Caregiver if Needed  spouse    Quality of Family Relationships  helpful;involved;supportive    Able to Return to Prior Arrangements  yes       Resource/Environmental Concerns    Resource/Environmental Concerns  none    Transportation Concerns  car, none       Transition Planning    Patient/Family Anticipates Transition to  home    Patient/Family Anticipated Services at Transition  none    Transportation Anticipated  family or friend will provide       Discharge Needs Assessment    Readmission Within the Last 30 Days  no previous admission in last 30 days    Concerns to be Addressed  no discharge needs identified;denies needs/concerns at this time    Equipment Currently Used at Home  wheelchair    Anticipated Changes Related to Illness  none    Equipment Needed After Discharge  none    Discharge Coordination/Progress  Pt has RX coverage and a PCP. Pt lived at home alone prior to admission. PT/OT are recommending SNF placement. Pt states that his wife is currently a resident of [facility] and he is requesting referral to be sent there. SW has faxed info to office and will await for possible bed offer.         Discharge Plan    No documentation.       Destination      Coordination has not been started for this encounter.      Durable Medical Equipment      Coordination has not been started for this encounter.      Dialysis/Infusion      Coordination has not been started for this encounter.      Home Medical Care      Coordination has not been started for this encounter.      Therapy      Coordination has not been started for this encounter.     Resubmitted pa to [facility] with provider's statement below.     Community Resources      Coordination has not been started for this encounter.          Demographic Summary    No documentation.       Functional Status    No documentation.       Psychosocial    No documentation.       Abuse/Neglect    No documentation.       Legal    No documentation.       Substance Abuse    No documentation.       Patient Forms    No documentation.           Zoey Gramajo